# Patient Record
Sex: MALE | Race: WHITE | Employment: OTHER | URBAN - METROPOLITAN AREA
[De-identification: names, ages, dates, MRNs, and addresses within clinical notes are randomized per-mention and may not be internally consistent; named-entity substitution may affect disease eponyms.]

---

## 2017-09-25 ENCOUNTER — LAB ONLY (OUTPATIENT)
Dept: INTERNAL MEDICINE CLINIC | Age: 77
End: 2017-09-25

## 2017-09-25 DIAGNOSIS — E11.9 TYPE 2 DIABETES MELLITUS WITHOUT COMPLICATION, WITHOUT LONG-TERM CURRENT USE OF INSULIN (HCC): Primary | ICD-10-CM

## 2017-09-25 LAB
GLUCOSE POC: 89 MG/DL (ref 75–110)
HBA1C MFR BLD HPLC: 6.1 % (ref 4.5–5.7)

## 2017-10-19 RX ORDER — HYDROCHLOROTHIAZIDE 12.5 MG/1
TABLET ORAL
Qty: 45 TAB | Refills: 2 | Status: SHIPPED | OUTPATIENT
Start: 2017-10-19 | End: 2018-07-19 | Stop reason: SDUPTHER

## 2017-10-19 NOTE — TELEPHONE ENCOUNTER
Requested Prescriptions     Pending Prescriptions Disp Refills    hydroCHLOROthiazide (HYDRODIURIL) 12.5 mg tablet [Pharmacy Med Name: HYDROCHLOROTHIAZIDE TABS 12.5MG] 45 Tab 2     Sig: TAKE 1 TABLET EVERY OTHER DAY       Last Refill: 1-20-17  Last visit:6-22-17

## 2017-10-23 RX ORDER — LISINOPRIL 20 MG/1
TABLET ORAL
Qty: 90 TAB | Refills: 3 | Status: SHIPPED | OUTPATIENT
Start: 2017-10-23 | End: 2018-10-18 | Stop reason: SDUPTHER

## 2017-10-23 NOTE — TELEPHONE ENCOUNTER
Requested Prescriptions     Pending Prescriptions Disp Refills    lisinopril (PRINIVIL, ZESTRIL) 20 mg tablet [Pharmacy Med Name: LISINOPRIL TABS 20MG] 90 Tab 3     Sig: TAKE 1 TABLET DAILY       Last Refill: 10-26-16  Last visit:6-22-17

## 2018-01-10 ENCOUNTER — ANESTHESIA EVENT (OUTPATIENT)
Dept: ENDOSCOPY | Age: 78
End: 2018-01-10
Payer: MEDICARE

## 2018-01-10 NOTE — ANESTHESIA PREPROCEDURE EVALUATION
Anesthetic History   No history of anesthetic complications            Review of Systems / Medical History  Patient summary reviewed, nursing notes reviewed and pertinent labs reviewed    Pulmonary                Comments: Former smoker - Quit 1987 - 50 pack years   Neuro/Psych             Comments: Peripheral Neuropathy Cardiovascular    Hypertension: well controlled              Exercise tolerance: >4 METS     GI/Hepatic/Renal     GERD: well controlled      Liver disease    Comments: Esophageal Dysphagia  H/O Esophageal Cancer  CHAVEZ  H/O Colon Polyps  Diverticulosis Endo/Other    Diabetes: well controlled, type 2    Arthritis    Comments: Diet-controlled DMII Other Findings   Comments: Vitamin D Deficiency         Physical Exam    Airway  Mallampati: I  TM Distance: > 6 cm  Neck ROM: normal range of motion   Mouth opening: Normal     Cardiovascular  Regular rate and rhythm,  S1 and S2 normal,  no murmur, click, rub, or gallop             Dental    Dentition: Edentulous, Full lower dentures and Full upper dentures     Pulmonary  Breath sounds clear to auscultation               Abdominal  GI exam deferred       Other Findings            Anesthetic Plan    ASA: 3  Anesthesia type: general and total IV anesthesia          Induction: Intravenous  Anesthetic plan and risks discussed with: Patient

## 2018-01-11 ENCOUNTER — ANESTHESIA (OUTPATIENT)
Dept: ENDOSCOPY | Age: 78
End: 2018-01-11
Payer: MEDICARE

## 2018-01-11 ENCOUNTER — HOSPITAL ENCOUNTER (OUTPATIENT)
Age: 78
Setting detail: OUTPATIENT SURGERY
Discharge: HOME | End: 2018-01-11
Attending: SPECIALIST | Admitting: SPECIALIST
Payer: MEDICARE

## 2018-01-11 VITALS
SYSTOLIC BLOOD PRESSURE: 110 MMHG | WEIGHT: 166 LBS | DIASTOLIC BLOOD PRESSURE: 69 MMHG | HEIGHT: 70 IN | RESPIRATION RATE: 13 BRPM | HEART RATE: 63 BPM | TEMPERATURE: 98.1 F | OXYGEN SATURATION: 97 % | BODY MASS INDEX: 23.77 KG/M2

## 2018-01-11 PROCEDURE — 74011250636 HC RX REV CODE- 250/636

## 2018-01-11 PROCEDURE — 74011000250 HC RX REV CODE- 250

## 2018-01-11 PROCEDURE — C1726 CATH, BAL DIL, NON-VASCULAR: HCPCS | Performed by: SPECIALIST

## 2018-01-11 PROCEDURE — 74011250636 HC RX REV CODE- 250/636: Performed by: SPECIALIST

## 2018-01-11 PROCEDURE — 77030018712 HC DEV BLLN INFL BSC -B: Performed by: SPECIALIST

## 2018-01-11 PROCEDURE — 76060000031 HC ANESTHESIA FIRST 0.5 HR: Performed by: SPECIALIST

## 2018-01-11 PROCEDURE — 76040000019: Performed by: SPECIALIST

## 2018-01-11 RX ORDER — SODIUM CHLORIDE 9 MG/ML
75 INJECTION, SOLUTION INTRAVENOUS CONTINUOUS
Status: DISCONTINUED | OUTPATIENT
Start: 2018-01-11 | End: 2018-01-11 | Stop reason: HOSPADM

## 2018-01-11 RX ORDER — SODIUM CHLORIDE 0.9 % (FLUSH) 0.9 %
5-10 SYRINGE (ML) INJECTION EVERY 8 HOURS
Status: DISCONTINUED | OUTPATIENT
Start: 2018-01-11 | End: 2018-01-11 | Stop reason: HOSPADM

## 2018-01-11 RX ORDER — EPINEPHRINE 0.1 MG/ML
1 INJECTION INTRACARDIAC; INTRAVENOUS
Status: DISCONTINUED | OUTPATIENT
Start: 2018-01-11 | End: 2018-01-11 | Stop reason: HOSPADM

## 2018-01-11 RX ORDER — PROPOFOL 10 MG/ML
INJECTION, EMULSION INTRAVENOUS AS NEEDED
Status: DISCONTINUED | OUTPATIENT
Start: 2018-01-11 | End: 2018-01-11 | Stop reason: HOSPADM

## 2018-01-11 RX ORDER — ATROPINE SULFATE 0.1 MG/ML
0.5 INJECTION INTRAVENOUS
Status: DISCONTINUED | OUTPATIENT
Start: 2018-01-11 | End: 2018-01-11 | Stop reason: HOSPADM

## 2018-01-11 RX ORDER — LIDOCAINE HYDROCHLORIDE 20 MG/ML
INJECTION, SOLUTION EPIDURAL; INFILTRATION; INTRACAUDAL; PERINEURAL AS NEEDED
Status: DISCONTINUED | OUTPATIENT
Start: 2018-01-11 | End: 2018-01-11 | Stop reason: HOSPADM

## 2018-01-11 RX ORDER — SODIUM CHLORIDE 0.9 % (FLUSH) 0.9 %
5-10 SYRINGE (ML) INJECTION AS NEEDED
Status: DISCONTINUED | OUTPATIENT
Start: 2018-01-11 | End: 2018-01-11 | Stop reason: HOSPADM

## 2018-01-11 RX ORDER — NALOXONE HYDROCHLORIDE 0.4 MG/ML
0.4 INJECTION, SOLUTION INTRAMUSCULAR; INTRAVENOUS; SUBCUTANEOUS
Status: DISCONTINUED | OUTPATIENT
Start: 2018-01-11 | End: 2018-01-11 | Stop reason: HOSPADM

## 2018-01-11 RX ORDER — MIDAZOLAM HYDROCHLORIDE 1 MG/ML
1-3 INJECTION, SOLUTION INTRAMUSCULAR; INTRAVENOUS
Status: DISCONTINUED | OUTPATIENT
Start: 2018-01-11 | End: 2018-01-11 | Stop reason: HOSPADM

## 2018-01-11 RX ORDER — DEXTROMETHORPHAN/PSEUDOEPHED 2.5-7.5/.8
1.2 DROPS ORAL
Status: DISCONTINUED | OUTPATIENT
Start: 2018-01-11 | End: 2018-01-11 | Stop reason: HOSPADM

## 2018-01-11 RX ORDER — MIDAZOLAM HYDROCHLORIDE 1 MG/ML
.25-5 INJECTION, SOLUTION INTRAMUSCULAR; INTRAVENOUS
Status: DISCONTINUED | OUTPATIENT
Start: 2018-01-11 | End: 2018-01-11 | Stop reason: HOSPADM

## 2018-01-11 RX ORDER — FLUMAZENIL 0.1 MG/ML
0.2 INJECTION INTRAVENOUS
Status: DISCONTINUED | OUTPATIENT
Start: 2018-01-11 | End: 2018-01-11 | Stop reason: HOSPADM

## 2018-01-11 RX ADMIN — LIDOCAINE HYDROCHLORIDE 50 MG: 20 INJECTION, SOLUTION EPIDURAL; INFILTRATION; INTRACAUDAL; PERINEURAL at 10:18

## 2018-01-11 RX ADMIN — SODIUM CHLORIDE 75 ML/HR: 900 INJECTION, SOLUTION INTRAVENOUS at 10:07

## 2018-01-11 RX ADMIN — PROPOFOL 70 MG: 10 INJECTION, EMULSION INTRAVENOUS at 10:22

## 2018-01-11 RX ADMIN — PROPOFOL 50 MG: 10 INJECTION, EMULSION INTRAVENOUS at 10:23

## 2018-01-11 RX ADMIN — PROPOFOL 30 MG: 10 INJECTION, EMULSION INTRAVENOUS at 10:28

## 2018-01-11 RX ADMIN — PROPOFOL 20 MG: 10 INJECTION, EMULSION INTRAVENOUS at 10:27

## 2018-01-11 RX ADMIN — PROPOFOL 30 MG: 10 INJECTION, EMULSION INTRAVENOUS at 10:25

## 2018-01-11 NOTE — DISCHARGE INSTRUCTIONS
Daniel Steve  [id number]  1940              Procedure  Discharge Instructions:      Discomfort:  Redness at IV site- apply warm compress to area; if redness or soreness persist- contact your physician  There may be a slight amount of blood passed from the rectum  Gaseous discomfort- walking, belching will help relieve any discomfort  You may not operate a vehicle for 12 hours  You may not engage in an occupation involving machinery or appliances for rest of today  You may not drink alcoholic beverages for at least 12 hours  Avoid making any critical decisions for at least 24 hour  DIET:   You may resume your normal diet today. You should not overeat or \"feast\" today as your abdomen may become distended or uncomfortable. MEDICATIONS:   I reconciled this list from the list you gave us when you came today for the procedure. Please clarify with me, your primary care physician and the nurse who is discharging you if we have any discrepancies. Aspirin and or non-steroidal medication (Ibuprofen, Motrin, naproxen, etc.) is ok in limited quantities. ACTIVITY:  You may resume your normal daily activities it is recommended that you spend the remainder of the day resting -  avoid any strenuous activity. CALL M.D. ANY SIGN OF:  Increasing pain, nausea, vomiting  Abdominal distension (swelling)  New increased bleeding (oral or rectal)  Fever (chills)  Pain in chest area  Bloody discharge from nose or mouth  Shortness of breath          Follow-up Instructions:   No biopsies   Telephone #  [phone]  Follow up visit as needed; call for dilation as needed. Ioana Abernathy MD  10:33 AM  1/11/2018   [employer] Activation    Thank you for requesting access to [employer]. Please follow the instructions below to securely access and download your online medical record.  [employer] allows you to send messages to your doctor, view your test results, renew your prescriptions, schedule appointments, and more.    How Do I Sign Up? 1. In your internet browser, go to [email or web address]  2. Click on the First Time User? Click Here link in the Sign In box. You will be redirect to the New Member Sign Up page. 3. Enter your O[employer] Access Code exactly as it appears below. You will not need to use this code after youve completed the sign-up process. If you do not sign up before the expiration date, you must request a new code. [employer] Access Code: [email or web address]  Expires: 2018 11:01 AM (This is the date your [employer] access code will )    4. Enter the last four digits of your Social Security Number (xxxx) and Date of Birth (mm/dd/yyyy) as indicated and click Submit. You will be taken to the next sign-up page. 5. Create a O[employer]ven ID. This will be your [employer] login ID and cannot be changed, so think of one that is secure and easy to remember. 6. Create a [employer] password. You can change your password at any time. 7. Enter your Password Reset Question and Answer. This can be used at a later time if you forget your password. 8. Enter your e-mail address. You will receive e-mail notification when new information is available in [address] 9. Click Sign Up. You can now view and download portions of your medical record. 10. Click the Download Summary menu link to download a portable copy of your medical information. Additional Information    If you have questions, please visit the Frequently Asked Questions section of the [employer] website at [email or web address] Remember, [employer] is NOT to be used for urgent needs. For medical emergencies, dial 911.

## 2018-01-11 NOTE — ANESTHESIA POSTPROCEDURE EVALUATION
Post-Anesthesia Evaluation and Assessment    Patient: Donny Craven MRN: [id number]  SSN: [id number]    YOB: 1940  Age: 66 y.o. Sex: male       Cardiovascular Function/Vital Signs  Visit Vitals    /75    Pulse (!) 59    Temp 36.6 °C (97.9 °F)    Resp 10    Ht 5' 10\" (1.778 m)    Wt 75.3 kg (166 lb)    SpO2 97%    BMI 23.82 kg/m2       Patient is status post general, total IV anesthesia anesthesia for Procedure(s):  ESOPHAGOGASTRODUODENOSCOPY (EGD)  ESOPHAGEAL DILATION. Nausea/Vomiting: None    Postoperative hydration reviewed and adequate. Pain:  Pain Scale 1: Visual (01/11/18 1035)  Pain Intensity 1: 0 (01/11/18 1035)   Managed    Neurological Status: At baseline    Mental Status and Level of Consciousness: Arousable    Pulmonary Status:   O2 Device: CO2 nasal cannula (01/11/18 1031)   Adequate oxygenation and airway patent    Complications related to anesthesia: None    Post-anesthesia assessment completed.  No concerns    Signed By: Eve Greenberg MD     January 11, 2018

## 2018-01-11 NOTE — IP AVS SNAPSHOT
[address]. 
[phone] Patient: Donny Craven MRN: [id number] SZA:[id number] About your hospitalization You were admitted on:  January 11, 2018 You last received care in the:  [facility] ENDOSCOPY You were discharged on:  January 11, 2018 Why you were hospitalized Your primary diagnosis was:  Not on File Your diagnoses also included:  Dysphagia, History Of Esophageal Cancer Follow-up Information Follow up With Details Comments Contact Info MD Ragini [address] [facility]. 
[phone] Your Scheduled Appointments Thursday January 25, 2018  9:30 AM EST COMPLETE PHYSICAL with Rachel Morel MD  
[place] [address] ([facility]) [address] [phone] Discharge Orders None A check jacey indicates which time of day the medication should be taken. My Medications CONTINUE taking these medications Instructions Each Dose to Equal  
 Morning Noon Evening Bedtime  
 aspirin 81 mg tablet Your last dose was: Your next dose is: Take 81 mg by mouth daily. 81 mg  
    
   
   
   
  
 cholecalciferol (VITAMIN D3) 5,000 unit Tab tablet Commonly known as:  VITAMIN D3 Your last dose was: Your next dose is: Take 5,000 Units by mouth daily. 5000 Units * hydroCHLOROthiazide 12.5 mg capsule Commonly known as:  Ivy Hamburger Your last dose was: Your next dose is: Take 12.5 mg by mouth every other day. 12.5 mg  
    
   
   
   
  
 * hydroCHLOROthiazide 12.5 mg tablet Commonly known as:  HYDRODIURIL Your last dose was: Your next dose is:  TAKE 1 TABLET EVERY OTHER DAY  
     
   
   
   
  
 lisinopril 20 mg tablet Commonly known as:  Shanae Mesa Your last dose was: Your next dose is: TAKE 1 TABLET DAILY MOEXIPRIL PO Your last dose was: Your next dose is: Take 15 mg by mouth daily. 15 mg MULTIVITAMIN PO Your last dose was: Your next dose is: Take  by mouth daily. NexIUM 40 mg capsule Generic drug:  esomeprazole Your last dose was: Your next dose is: Take 40 mg by mouth every evening. 40 mg  
    
   
   
   
  
 PREVALITE PO Your last dose was: Your next dose is: Take 5.5 g by mouth every evening. 5.5 g  
    
   
   
   
  
 sildenafil citrate 50 mg tablet Commonly known as:  VIAGRA Your last dose was: Your next dose is: Take 50 mg by mouth as needed. 50 mg  
    
   
   
   
  
 VITAMIN B-12 PO Your last dose was: Your next dose is: Take 1,000 mg by mouth daily. 2 tablets 1000 mg  
    
   
   
   
  
 VITAMIN B-6 100 mg tablet Generic drug:  pyridoxine (vitamin B6) Your last dose was: Your next dose is: Take 100 mg by mouth daily. 100 mg  
    
   
   
   
  
 VITAMINS A AND D PO Your last dose was: Your next dose is: Take 1 Cap by mouth daily. 1 Cap ZANTAC 150 mg tablet Generic drug:  raNITIdine Your last dose was: Your next dose is: Take 150 mg by mouth every morning. 150 mg  
    
   
   
   
  
 * Notice: This list has 2 medication(s) that are the same as other medications prescribed for you. Read the directions carefully, and ask your doctor or other care provider to review them with you. Discharge Instructions Ohjailene Danny [id number] 
1940 Procedure  Discharge Instructions:   
 
Discomfort: 
Redness at IV site- apply warm compress to area; if redness or soreness persist- contact your physician There may be a slight amount of blood passed from the rectum Gaseous discomfort- walking, belching will help relieve any discomfort You may not operate a vehicle for 12 hours You may not engage in an occupation involving machinery or appliances for rest of today You may not drink alcoholic beverages for at least 12 hours Avoid making any critical decisions for at least 24 hour DIET: 
 You may resume your normal diet today. You should not overeat or \"feast\" today as your abdomen may become distended or uncomfortable. MEDICATIONS: 
 I reconciled this list from the list you gave us when you came today for the procedure. Please clarify with me, your primary care physician and the nurse who is discharging you if we have any discrepancies. Aspirin and or non-steroidal medication (Ibuprofen, Motrin, naproxen, etc.) is ok in limited quantities. ACTIVITY: 
You may resume your normal daily activities it is recommended that you spend the remainder of the day resting -  avoid any strenuous activity. CALL M.D. ANY SIGN OF: Increasing pain, nausea, vomiting Abdominal distension (swelling) New increased bleeding (oral or rectal) Fever (chills) Pain in chest area Bloody discharge from nose or mouth Shortness of breath Follow-up Instructions: No biopsies Telephone #  [phone] Follow up visit as needed; call for dilation as needed. Ioana Abernathy MD 
10:33 AM 
1/11/2018 [employer] Activation Thank you for requesting access to [employer]. Please follow the instructions below to securely access and download your online medical record. [employer] allows you to send messages to your doctor, view your test results, renew your prescriptions, schedule appointments, and more. How Do I Sign Up? 1. In your internet browser, go to [email or web address] 
2. Click on the First Time User? Click Here link in the Sign In box. You will be redirect to the New Member Sign Up page. 3. Enter your [employer] Access Code exactly as it appears below. You will not need to use this code after youve completed the sign-up process. If you do not sign up before the expiration date, you must request a new code. [employer] Access Code: [email or web address] Expires: 2018 11:01 AM (This is the date your [employer] access code will ) 4. Enter the last four digits of your Social Security Number (xxxx) and Date of Birth (mm/dd/yyyy) as indicated and click Submit. You will be taken to the next sign-up page. 5. Create a [employer]Master ID. This will be your [employer] login ID and cannot be changed, so think of one that is secure and easy to remember. 6. Create a [employer] password. You can change your password at any time. 7. Enter your Password Reset Question and Answer. This can be used at a later time if you forget your password. 8. Enter your e-mail address. You will receive e-mail notification when new information is available in [address] 9. Click Sign Up. You can now view and download portions of your medical record. 10. Click the Download Summary menu link to download a portable copy of your medical information. Additional Information If you have questions, please visit the Frequently Asked Questions section of the [employer] website at [email or web address] Remember, [employer] is NOT to be used for urgent needs. For medical emergencies, dial 911. Introducing [facility]! Jamin Goncalves introduces [employer] patient portal. Now you can access parts of your medical record, email your doctor's office, and request medication refills online. 1. In your internet browser, go to [email or web address] 2. Click on the First Time User? Click Here link in the Sign In box.  You will see the New Member Sign Up page. 3. Enter your CinemaNow Access Code exactly as it appears below. You will not need to use this code after youve completed the sign-up process. If you do not sign up before the expiration date, you must request a new code. · [employer] Access Code: [email or web address] Expires: 4/11/2018 11:01 AM 
 
4. Enter the last four digits of your Social Security Number (xxxx) and Date of Birth (mm/dd/yyyy) as indicated and click Submit. You will be taken to the next sign-up page. 5. Create a Producteevt ID. This will be your [employer] login ID and cannot be changed, so think of one that is secure and easy to remember. 6. Create a [employer]Now password. You can change your password at any time. 7. Enter your Password Reset Question and Answer. This can be used at a later time if you forget your password. 8. Enter your e-mail address. You will receive e-mail notification when new information is available in [facility] 9. Click Sign Up. You can now view and download portions of your medical record. 10. Click the Download Summary menu link to download a portable copy of your medical information. If you have questions, please visit the Frequently Asked Questions section of the [employer] website. Remember, [employer] is NOT to be used for urgent needs. For medical emergencies, dial 911. Now available from your iPhone and Android! Providers Seen During Your Hospitalization Provider Specialty Primary office phone Griselda Easter, MD Gastroenterology [phone] Your Primary Care Physician (PCP) Primary Care Physician Office Phone Office Fax Yesica Winchester [phone] You are allergic to the following No active allergies Recent Documentation Height Weight BMI Smoking Status 1.778 m 75.3 kg 23.82 kg/m2 Former Smoker Emergency Contacts Name Discharge Info Relation Home Work Mobile Lara CAREGIVER [3] Spouse [3] [phone] Sherry Briceno  Child [2]   [phone] Patient Belongings The following personal items are in your possession at time of discharge: 
  Dental Appliances: None  Visual Aid: None Please provide this summary of care documentation to your next provider. Signatures-by signing, you are acknowledging that this After Visit Summary has been reviewed with you and you have received a copy. Patient Signature:  ____________________________________________________________ Date:  ____________________________________________________________  
  
[facility] Provider Signature:  ____________________________________________________________ Date:  ____________________________________________________________

## 2018-01-11 NOTE — PROGRESS NOTES
Anesthesia reports 200mg Propofol, 50mg Lidocaine and 500mL NS given during procedure. Received report from anesthesia staff on vital signs and status of patient.

## 2018-01-11 NOTE — PROGRESS NOTES
CRE balloon dilatation of the esophagus   12-15 mm Balloon inflated to 12 ATMs and held for 60 seconds. 12-15 mm Balloon inflated to 13.5 ATMs and held for 60 seconds. 12-15 mm Balloon inflated to 15 ATMs and held for 60 seconds. No subcutaneous crepitus of the chest or cervical region was noted post dilatation.

## 2018-01-11 NOTE — ROUTINE PROCESS
Sherly Dies  1940  [id number]    Situation:  Verbal report received from:  CHEYENNE Valdes RN  Procedure: Procedure(s):  ESOPHAGOGASTRODUODENOSCOPY (EGD)  ESOPHAGEAL DILATION    Background:    Preoperative diagnosis: ESOPHAGEAL DYSPHAGIA   Postoperative diagnosis: Post op exam, proximal esophageal stenosis    :  Dr. Tracey Orlando  Assistant(s): Endoscopy Technician-1: Gladys Victoria  Endoscopy RN-1: Shahnaz Zamora RN    Specimens: * No specimens in log *  H. Pylori  no    Assessment:  Intra-procedure medications     Anesthesia gave intra-procedure sedation and medications, see anesthesia flow sheet     Intravenous fluids: NS@ KVO     Vital signs stable     Abdominal assessment: round and soft     Recommendation:  Discharge patient per MD order  Family or Friend   Permission to share finding with family or friend yes

## 2018-01-11 NOTE — PROCEDURES
Esophagogastroduodenoscopy    Indications:  Dysphagia  Known esophageal stricture    Medications:  See anesthesia form    Post procedure diagnosis:  Post op exam, proximal esophageal stenosis    Description of Procedure:    Prior to the procedure its objectives, risks, consequences and alternatives were discussed with the patient who then elected to proceed. The Olympus video endoscope was inserted under direct vision into the mouth and then into the esophagus. The esophagus looked normal to 20 cm. There was a bland anastomotic stenosis. The z-line was located at 22cm. There was bile in the stomach. There were no diagnostic abnormalities of the body, fundus, antrum, cardia and incisura of the stomach. This included direct and retroflexion examination. The pylorus was widely patent. The first and second portion of the duodenum appeared normal.  I then dilated the distal esophagus with a through the scope balloon. I dilated from 12mm to 13.5mm to 15 mm. Each time the balloon was inflated for sixty seconds. There were no apparent complications. There was a small non transmural tear after the third dilation      Complications: There were no apparent complications and the patient tolerated the procedure well.         Estimated Blood Loss:  none  Specimens Removed:  none  Impressions:  Successful dilation of anastomotic stricture to 15mm  Post op exam      Signed By: Ponce Del Toro MD                        January 11, 2018     10:30 AM

## 2018-01-11 NOTE — H&P
Pre-endoscopy H and P    The patient was seen and examined in the endoscopy suite. The airway was assessed and docuemented. The problem list, past medical history, and medications were reviewed. Patient Active Problem List   Diagnosis Code    History of esophageal cancer Z85.01    Common bile duct stone K80.50    Choledocholithiasis K80.50    Diabetes mellitus ([facility] [address] E11.9    Essential hypertension, benign I10    Esophageal reflux K21.9    Personal history of colonic polyps Z86.010    Dysphagia R13.10     Social History     Social History    Marital status:      Spouse name: N/A    Number of children: N/A    Years of education: N/A     Occupational History    Not on file. Social History Main Topics    Smoking status: Former Smoker     Packs/day: 2.00     Years: 25.00     Quit date: 1/1/1987    Smokeless tobacco: Never Used    Alcohol use No    Drug use: No    Sexual activity: Not on file     Other Topics Concern    Not on file     Social History Narrative     Past Medical History:   Diagnosis Date    Anemia     r/t chemo    Arthritis     BPH (benign prostatic hyperplasia)     Cancer ([facility] [address] 2008    esophageal (chemo and radiation completed 2011)    Diabetes ([facility] [address]     type 2 - diet controlled    Diverticulosis     GERD (gastroesophageal reflux disease)     with history of esophageal stricture    Hx of colonic polyps     Hypertension     Kidney cyst     CHAVEZ (nonalcoholic steatohepatitis) 2000    \"fatty liver\" diet related    Peripheral neuropathy     secondary to chemotherapy    Personal history of colonic polyps 12/6/2012    Thrombocytopenia ([facility] [address]     r/t chemo    Vitamin D deficiency      The patient has a family history of na    Prior to Admission Medications   Prescriptions Last Dose Informant Patient Reported? Taking? CHOLESTYRAMINE/ASPARTAME (PREVALITE PO) 1/10/2018 at Unknown time  Yes Yes   Sig: Take 5.5 g by mouth every evening.    CYANOCOBALAMIN (VITAMIN B-12 PO) 1/10/2018 at Unknown time  Yes Yes   Sig: Take 1,000 mg by mouth daily. 2 tablets   MOEXIPRIL HCL (MOEXIPRIL PO) 1/10/2018 at Unknown time  Yes Yes   Sig: Take 15 mg by mouth daily. MULTIVITAMINS (MULTIVITAMIN PO) 1/10/2018 at Unknown time  Yes Yes   Sig: Take  by mouth daily. VITAMINS A AND D PO 1/10/2018 at Unknown time  Yes Yes   Sig: Take 1 Cap by mouth daily. aspirin 81 mg tablet 1/10/2018 at Unknown time  Yes Yes   Sig: Take 81 mg by mouth daily. cholecalciferol, VITAMIN D3, (VITAMIN D3) 5,000 unit tab tablet 1/10/2018 at Unknown time  Yes Yes   Sig: Take 5,000 Units by mouth daily. esomeprazole (NEXIUM) 40 mg capsule 1/10/2018 at Unknown time  Yes Yes   Sig: Take 40 mg by mouth every evening. hydroCHLOROthiazide (HYDRODIURIL) 12.5 mg tablet 1/10/2018 at Unknown time  No Yes   Sig: TAKE 1 TABLET EVERY OTHER DAY   hydrochlorothiazide (MICROZIDE) 12.5 mg capsule 1/10/2018 at Unknown time  Yes Yes   Sig: Take 12.5 mg by mouth every other day. lisinopril (PRINIVIL, ZESTRIL) 20 mg tablet 1/10/2018 at Unknown time  No Yes   Sig: TAKE 1 TABLET DAILY   pyridoxine (VITAMIN B-6) 100 mg tablet 1/10/2018 at Unknown time  Yes Yes   Sig: Take 100 mg by mouth daily. ranitidine (ZANTAC) 150 mg tablet 1/10/2018 at Unknown time  Yes Yes   Sig: Take 150 mg by mouth every morning. sildenafil citrate (VIAGRA) 50 mg tablet 1/10/2018 at Unknown time  Yes Yes   Sig: Take 50 mg by mouth as needed. Facility-Administered Medications: None           The review of systems is:  negative for shortness of breath or chest pain      The heart, lungs, and mental status were satisfactory for the administration of anesthesia sedation and for the procedure. I discussed with the patient the objectives, risks, consequences and alternatives to the procedure.       Adela Harris MD  1/11/2018  10:07 AM

## 2018-01-16 PROBLEM — K57.90 DIVERTICULOSIS: Status: ACTIVE | Noted: 2018-01-16

## 2018-01-16 PROBLEM — G62.0 DRUG-INDUCED POLYNEUROPATHY (HCC): Status: ACTIVE | Noted: 2018-01-16

## 2018-01-16 PROBLEM — K22.2 GERD WITH STRICTURE: Status: ACTIVE | Noted: 2018-01-16

## 2018-01-16 PROBLEM — J38.01 UNILATERAL PARTIAL PARALYSIS OF VOCAL CORDS OR LARYNX: Status: ACTIVE | Noted: 2018-01-16

## 2018-01-16 PROBLEM — E55.9 VITAMIN D DEFICIENCY: Status: ACTIVE | Noted: 2018-01-16

## 2018-01-16 PROBLEM — I10 HYPERTENSION: Status: ACTIVE | Noted: 2018-01-16

## 2018-01-16 PROBLEM — K21.9 GERD WITH STRICTURE: Status: ACTIVE | Noted: 2018-01-16

## 2018-01-16 PROBLEM — N40.0 BPH WITHOUT OBSTRUCTION/LOWER URINARY TRACT SYMPTOMS: Status: ACTIVE | Noted: 2018-01-16

## 2018-01-19 ENCOUNTER — HOSPITAL ENCOUNTER (OUTPATIENT)
Dept: GENERAL RADIOLOGY | Age: 78
Discharge: HOME | End: 2018-01-19
Attending: SPECIALIST
Payer: MEDICARE

## 2018-01-19 DIAGNOSIS — K21.9 GERD (GASTROESOPHAGEAL REFLUX DISEASE): ICD-10-CM

## 2018-01-19 DIAGNOSIS — R13.19 CONSTANT LOW-GRADE DYSPHAGIA: ICD-10-CM

## 2018-01-19 PROCEDURE — 74241 XR UPPER GI W KUB/ BA SWALLOW: CPT

## 2018-01-22 RX ORDER — ESOMEPRAZOLE MAGNESIUM 40 MG/1
CAPSULE, DELAYED RELEASE ORAL
Qty: 90 CAP | Refills: 1 | Status: SHIPPED | OUTPATIENT
Start: 2018-01-22 | End: 2018-07-19 | Stop reason: SDUPTHER

## 2018-01-25 ENCOUNTER — OFFICE VISIT (OUTPATIENT)
Dept: INTERNAL MEDICINE CLINIC | Age: 78
End: 2018-01-25

## 2018-01-25 VITALS
BODY MASS INDEX: 24.34 KG/M2 | WEIGHT: 170 LBS | OXYGEN SATURATION: 99 % | HEART RATE: 65 BPM | DIASTOLIC BLOOD PRESSURE: 64 MMHG | SYSTOLIC BLOOD PRESSURE: 110 MMHG | HEIGHT: 70 IN

## 2018-01-25 DIAGNOSIS — I10 ESSENTIAL HYPERTENSION: Primary | Chronic | ICD-10-CM

## 2018-01-25 DIAGNOSIS — E11.9 TYPE 2 DIABETES MELLITUS WITHOUT COMPLICATION, WITHOUT LONG-TERM CURRENT USE OF INSULIN (HCC): Chronic | ICD-10-CM

## 2018-01-25 DIAGNOSIS — Z86.010 HISTORY OF COLONIC POLYPS: ICD-10-CM

## 2018-01-25 DIAGNOSIS — G62.0 DRUG-INDUCED POLYNEUROPATHY (HCC): ICD-10-CM

## 2018-01-25 DIAGNOSIS — K21.9 GERD WITH STRICTURE: ICD-10-CM

## 2018-01-25 DIAGNOSIS — N40.0 BPH WITHOUT OBSTRUCTION/LOWER URINARY TRACT SYMPTOMS: ICD-10-CM

## 2018-01-25 DIAGNOSIS — E55.9 VITAMIN D DEFICIENCY: ICD-10-CM

## 2018-01-25 DIAGNOSIS — Z13.39 SCREENING FOR ALCOHOLISM: ICD-10-CM

## 2018-01-25 DIAGNOSIS — Z85.01 HISTORY OF ESOPHAGEAL CANCER: ICD-10-CM

## 2018-01-25 DIAGNOSIS — Z00.00 INITIAL MEDICARE ANNUAL WELLNESS VISIT: ICD-10-CM

## 2018-01-25 DIAGNOSIS — K22.2 GERD WITH STRICTURE: ICD-10-CM

## 2018-01-25 LAB
ALBUMIN SERPL-MCNC: 4.4 G/DL (ref 3.9–5.4)
ALKALINE PHOS POC: 80 U/L (ref 38–126)
ALT SERPL-CCNC: 20 U/L (ref 9–52)
AST SERPL-CCNC: 27 U/L (ref 14–36)
BACTERIA UA POCT, BACTPOCT: NORMAL
BILIRUB UR QL STRIP: NEGATIVE
BUN BLD-MCNC: 18 MG/DL (ref 9–20)
CALCIUM BLD-MCNC: 10.1 MG/DL (ref 8.4–10.2)
CASTS UA POCT: 0
CHLORIDE BLD-SCNC: 102 MMOL/L (ref 98–107)
CHOLEST SERPL-MCNC: 154 MG/DL (ref 0–200)
CLUE CELLS, CLUEPOCT: NEGATIVE
CO2 POC: 27 MMOL/L (ref 22–32)
CREAT BLD-MCNC: 1.2 MG/DL (ref 0.8–1.5)
CRYSTALS UA POCT, CRYSPOCT: NEGATIVE
EGFR (POC): 57.6
EPITHELIAL CELLS POCT: NEGATIVE
GLUCOSE POC: 94 MG/DL (ref 75–110)
GLUCOSE UR-MCNC: NEGATIVE MG/DL
GRAN# POC: 4.2 K/UL (ref 2–7.8)
GRAN% POC: 60.5 % (ref 37–92)
HBA1C MFR BLD HPLC: 6 % (ref 4.5–5.7)
HCT VFR BLD CALC: 42.6 % (ref 37–51)
HDLC SERPL-MCNC: 53 MG/DL (ref 35–130)
HGB BLD-MCNC: 14.1 G/DL (ref 12–18)
KETONES P FAST UR STRIP-MCNC: NEGATIVE MG/DL
LDL CHOLESTEROL POC: 80.2 MG/DL (ref 0–130)
LY# POC: 2.2 K/UL (ref 0.6–4.1)
LY% POC: 34.4 % (ref 10–58.5)
MCH RBC QN: 29.2 PG (ref 26–32)
MCHC RBC-ENTMCNC: 33.1 G/DL (ref 30–36)
MCV RBC: 88 FL (ref 80–97)
MICROALBUMIN UR TEST STR-MCNC: NEGATIVE MG/L (ref 0–20)
MID #, POC: 0.3 K/UL (ref 0–1.8)
MID% POC: 5.1 % (ref 0.1–24)
MUCUS UA POCT, MUCPOCT: NORMAL
PH UR STRIP: 6 [PH] (ref 5–7)
PLATELET # BLD: 179 K/UL (ref 140–440)
POTASSIUM SERPL-SCNC: 4.9 MMOL/L (ref 3.6–5)
PROT SERPL-MCNC: 7.3 G/DL (ref 6.3–8.2)
PROT UR QL STRIP: NEGATIVE
PSA SERPL-MCNC: 1.3 NG/ML (ref 0–4)
RBC # BLD: 4.83 M/UL (ref 4.2–6.3)
RBC UA POCT, RBCPOCT: 0
SODIUM SERPL-SCNC: 142 MMOL/L (ref 137–145)
SP GR UR STRIP: 1.01 (ref 1.01–1.02)
TCHOL/HDL RATIO (POC): 2.9 (ref 0–4)
TOTAL BILIRUBIN POC: 0.7 MG/DL (ref 0.2–1.3)
TRICH UA POCT, TRICHPOC: NEGATIVE
TRIGL SERPL-MCNC: 104 MG/DL (ref 0–200)
TSH BLD-ACNC: 1.17 UIU/ML (ref 0.4–4.2)
UA UROBILINOGEN AMB POC: NORMAL (ref 0.2–1)
URINALYSIS CLARITY POC: CLEAR
URINALYSIS COLOR POC: NORMAL
URINE BLOOD POC: NEGATIVE
URINE CULT COMMENT, POCT: NORMAL
URINE LEUKOCYTES POC: NEGATIVE
URINE NITRITES POC: NEGATIVE
VITAMIN D POC: 53 NG/ML (ref 30–96)
VLDLC SERPL CALC-MCNC: 20.8 MG/DL
WBC # BLD: 6.7 K/UL (ref 4.1–10.9)
WBC UA POCT, WBCPOCT: 0
YEAST UA POCT, YEASTPOC: NEGATIVE

## 2018-01-25 NOTE — MR AVS SNAPSHOT
[address] [id number] [phone] Patient: Violeta Silverman MRN: [id number] EOO:[birth year] Visit Information Date & Time Provider Department Dept. Phone Encounter #  
 1/25/2018  9:30 AM Pedro Velasco [phone] [id number] Follow-up Instructions Return in about 6 months (around 7/25/2018). Your Appointments 1/25/2018  9:30 AM  
COMPLETE PHYSICAL with MD Pedro Velasco 26 ([facility]) Appt Note: review; review [address] [id number] [facility]. [facility] [address] Upcoming Health Maintenance Date Due  
 LIPID PANEL Q1 1940 FOOT EXAM Q1 1/2/1950 MICROALBUMIN Q1 1/2/1950 EYE EXAM RETINAL OR DILATED Q1 1/2/1950 DTaP/Tdap/Td series (1 - Tdap) 1/2/1961 ZOSTER VACCINE AGE 60> 11/2/1999 GLAUCOMA SCREENING Q2Y 1/2/2005 MEDICARE YEARLY EXAM 1/2/2005 Influenza Age 5 to Adult 8/1/2017 HEMOGLOBIN A1C Q6M 3/25/2018 Allergies as of 1/25/2018  Review Complete On: 1/25/2018 By: Rosamaria Fermin MD  
 No Known Allergies Current Immunizations  Reviewed on 9/2/2011 Name Date Influenza High Dose Vaccine PF 9/1/2017 Influenza Vaccine 9/30/2016, 10/13/2015 Influenza Vaccine Split 9/26/2011 Pneumococcal Conjugate (PCV-13) 11/30/2015 Pneumococcal Polysaccharide (PPSV-23) 9/2/2009, 10/23/2008 Not reviewed this visit You Were Diagnosed With   
  
 Codes Comments Essential hypertension    -  Primary ICD-10-CM: I10 
ICD-9-CM: 401.9 Type 2 diabetes mellitus without complication, without long-term current use of insulin (HCC)     ICD-10-CM: E11.9 ICD-9-CM: 250.00 GERD with stricture     ICD-10-CM: K21.9, K22.2 ICD-9-CM: 530.81, 530.3 History of esophageal cancer     ICD-10-CM: Z85.01 
ICD-9-CM: V10.03 History of colonic polyps     ICD-10-CM: Z86.010 
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  
 110/64 (BP 1 Location: Left arm, BP Patient Position: Sitting) 65 5' 10\" (1.778 m) 170 lb (77.1 kg) 99% 24.39 kg/m2 Smoking Status Former Smoker BMI and BSA Data Body Mass Index Body Surface Area  
 24.39 kg/m 2 1.95 m 2 Preferred Pharmacy Pharmacy Name [address] [phone] Your Updated Medication List  
  
   
This list is accurate as of: 1/25/18  9:28 AM.  Always use your most recent med list.  
  
  
  
  
 aspirin 81 mg tablet Take 81 mg by mouth daily. cholecalciferol (VITAMIN D3) 5,000 unit Tab tablet Commonly known as:  VITAMIN D3 Take 5,000 Units by mouth daily. esomeprazole 40 mg capsule Commonly known as:  NEXIUM  
TAKE 1 CAPSULE DAILY FOLGARD RX 2.2-25-1 mg Tab Generic drug:  folic acid-vit X9-XHY Q25 Take  by mouth. hydroCHLOROthiazide 12.5 mg tablet Commonly known as:  HYDRODIURIL  
TAKE 1 TABLET EVERY OTHER DAY  
  
 lisinopril 20 mg tablet Commonly known as:  PRINIVIL, ZESTRIL  
TAKE 1 TABLET DAILY MOEXIPRIL PO Take 15 mg by mouth daily. MULTIVITAMIN PO Take  by mouth daily. PREVALITE PO Take 5.5 g by mouth every evening. sildenafil citrate 50 mg tablet Commonly known as:  VIAGRA Take 50 mg by mouth as needed. VITAMIN B-12 PO Take 1,000 mg by mouth daily. 2 tablets VITAMIN B-6 100 mg tablet Generic drug:  pyridoxine (vitamin B6) Take 100 mg by mouth daily. VITAMINS A AND D PO Take 1 Cap by mouth daily. ZANTAC 150 mg tablet Generic drug:  raNITIdine Take 150 mg by mouth every morning. We Performed the Following AMB POC COMPLETE CBC,AUTOMATED ENTER [id number] CPT(R)] AMB POC COMPREHENSIVE METABOLIC PANEL [35956 CPT(R)] AMB POC HEMOGLOBIN A1C [20701 CPT(R)] AMB POC LIPID PROFILE [33351 CPT(R)] AMB POC PSA [98891 CPT(R)] AMB POC TSH [44926 CPT(R)] AMB POC URINALYSIS DIP STICK AUTO W/ MICRO  [94529 CPT(R)] AMB POC URINE, MICROALBUMIN, SEMIQUANT (1 RESULT) [24277 CPT(R)] AMB POC VITAMIN D [54793 CPT(R)] COLLECTION VENOUS BLOOD,VENIPUNCTURE [id number] CPT(R)] Follow-up Instructions Return in about 6 months (around 7/25/2018). Patient Instructions Diabetes Foot Health: Care Instructions Your Care Instructions When you have diabetes, your feet need extra care and attention. Diabetes can damage the nerve endings and blood vessels in your feet, making you less likely to notice when your feet are injured. Diabetes also limits your body's ability to fight infection and get blood to areas that need it. If you get a minor foot injury, it could become an ulcer or a serious infection. With good foot care, you can prevent most of these problems. Caring for your feet can be quick and easy. Most of the care can be done when you are bathing or getting ready for bed. Follow-up care is a key part of your treatment and safety. Be sure to make and go to all appointments, and call your doctor if you are having problems. It's also a good idea to know your test results and keep a list of the medicines you take. How can you care for yourself at home? · Keep your blood sugar close to normal by watching what and how much you eat, monitoring blood sugar, taking medicines if prescribed, and getting regular exercise. · Do not smoke. Smoking affects blood flow and can make foot problems worse. If you need help quitting, talk to your doctor about stop-smoking programs and medicines. These can increase your chances of quitting for good. · Eat a diet that is low in fats. High fat intake can cause fat to build up in your blood vessels and decrease blood flow. · Inspect your feet daily for blisters, cuts, cracks, or sores. If you cannot see well, use a mirror or have someone help you. · Take care of your feet: 
[facility] AUTHOR[employer] your feet every day. Use warm (not hot) water. Check the water temperature with your wrists or other part of your body, not your feet. ¨ Dry your feet well. Pat them dry. Do not rub the skin on your feet too hard. Dry well between your toes. If the skin on your feet stays moist, bacteria or a fungus can grow, which can lead to infection. ¨ Keep your skin soft. Use moisturizing skin cream to keep the skin on your feet soft and prevent calluses and cracks. But do not put the cream between your toes, and stop using any cream that causes a rash. ¨ Clean underneath your toenails carefully. Do not use a sharp object to clean underneath your toenails. Use the blunt end of a nail file or other rounded tool. ¨ Trim and file your toenails straight across to prevent ingrown toenails. Use a nail clipper, not scissors. Use an emery board to smooth the edges. · Change socks daily. Socks without seams are best, because seams often rub the feet. You can find socks for people with diabetes from specialty catalogs. · Look inside your shoes every day for things like gravel or torn linings, which could cause blisters or sores. · Buy shoes that fit well: 
¨ Look for shoes that have plenty of space around the toes. This helps prevent bunions and blisters. ¨ Try on shoes while wearing the kind of socks you will usually wear with the shoes. ¨ Avoid plastic shoes. They may rub your feet and cause blisters.  Good shoes should be made of materials that are flexible and breathable, such as leather or cloth. ¨ Break in new shoes slowly by wearing them for no more than an hour a day for several days. Take extra time to check your feet for red areas, blisters, or other problems after you wear new shoes. · Do not go barefoot. Do not wear sandals, and do not wear shoes with very thin soles. Thin soles are easy to puncture. They also do not protect your feet from hot pavement or cold weather. · Have your doctor check your feet during each visit. If you have a foot problem, see your doctor. Do not try to treat an early foot problem at home. Home remedies or treatments that you can buy without a prescription (such as corn removers) can be harmful. · Always get early treatment for foot problems. A minor irritation can lead to a major problem if not properly cared for early. When should you call for help? Call your doctor now or seek immediate medical care if: 
? · You have a foot sore, an ulcer or break in the skin that is not healing after 4 days, bleeding corns or calluses, or an ingrown toenail. ? · You have blue or black areas, which can mean bruising or blood flow problems. ? · You have peeling skin or tiny blisters between your toes or cracking or oozing of the skin. ? · You have a fever for more than 24 hours and a foot sore. ? · You have new numbness or tingling in your feet that does not go away after you move your feet or change positions. ? · You have unexplained or unusual swelling of the foot or ankle. ? Watch closely for changes in your health, and be sure to contact your doctor if: 
? · You cannot do proper foot care. Where can you learn more? Go to http://pavan-erna.info/. Enter A739 in the search box to learn more about \"Diabetes Foot Health: Care Instructions. \" Current as of: March 13, 2017 Content Version: 11.4 © 0301-9068 [employer]. Care instructions adapted under license by [employer] (which disclaims liability or warranty for this information). If you have questions about a medical condition or this instruction, always ask your healthcare professional. [address] any warranty or liability for your use of this information. Introducing [address]! Dear Bre Noguera: Thank you for requesting a [employer] account. Our records indicate that you already have an active [employer] account. You can access your account anytime at [email or web address] Did you know that you can access your hospital and ER discharge instructions at any time in [employer]ation? You can also review all of your test results from your hospital stay or ER visit. Additional Information If you have questions, please visit the Frequently Asked Questions section of the [employer] website at [email or web address] Remember, [employer] is NOT to be used for urgent needs. For medical emergencies, dial 911. Now available from your iPhone and Android! Please provide this summary of care documentation to your next provider. Your primary care clinician is listed as THERON Stewart. If you have any questions after today's visit, please call [phone].

## 2018-01-25 NOTE — PROGRESS NOTES
This note will not be viewable in [address] Roselia Mccartney is a 66 y.o. male and presents with Complete Physical  .    Subjective:  Mr. Mary Caldwell presents to the office today for complete checkup. The patient has hypertension. He currently remains on lisinopril, hydrochlorothiazide . His blood pressures have been well controlled. He denies any dizziness, cough or lower extremity edema. There is been no muscle cramping, headaches, numbness, tingling or focal neurological problems. He has type 2 diabetes mellitus which is now managed with diet. He lost a fair amount of weight as he did suffer with esophageal cancer and is not currently on any medication. He checks his blood sugars once daily and notes that his fasting blood sugars are generally in the 90 range. He has had a diabetic retinal eye exam done within the last year. 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 It was due to chemotherapy for treatment of his esophageal cancer. The patient's esophageal cancer was treated with chemotherapy and radiation therapy. Patient does have continued problems with esophageal strictures for which he is followed by Dr. Shaila Daley and last saw him 3 weeks ago at which time he was dilated again. He currently denies any dysphasia. He does remain on PPI therapy and Zantac for this. Patient has vitamin D deficiency and remains on supplementation for this. The patient has BPH without L UTS. He currently has no flow problems. He is due for PSA testing. He does have colon polyps and did have a colonoscopy within the last several years and will not be due for Dr. Shaila Daley to do this in another year. There is been no change in his bowel movements. The patient is up-to-date on his influenza and pneumococcal vaccinations. His review of systems is otherwise negative is noted.     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 1/16/2018    Cancer ([facility] [address] 2008    esophageal (chemo and radiation completed 2011)    Diverticulosis     Drug-induced polyneuropathy ([facility] [address] 1/16/2018    Due to chemotherapy    GERD with stricture 1/16/2018    Status post dilatation    Hx of colonic polyps     Hypertension     Kidney cyst     CHAVEZ (nonalcoholic steatohepatitis) 2000    \"fatty liver\" diet related    Personal history of colonic polyps 12/6/2012    Thrombocytopenia ([facility] [address]     r/t chemo    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    Vitamin D deficiency 1/16/2018     Past Surgical History:   Procedure Laterality Date    CHEST SURGERY PROCEDURE UNLISTED      esphogus removed; stomach in chest cavity    CHEST SURGERY PROCEDURE UNLISTED      left chcest prtacath    COLONOSCOPY N/A 6/23/2016    COLONOSCOPY performed by Gabriela Garcia MD at [facility] ENDOSCOPY    HX CHOLECYSTECTOMY  9/29/11    HX GI      hemorrhoidectomy    HX HEENT      cataract    HX HERNIA REPAIR      HX OTHER SURGICAL      esophagus tumor removed    HX VASCULAR ACCESS Right     removed 2014    [place] COLSC FLX W/RMVL OF TUMOR POLYP LESION SNARE TQ  7/1/2010         [place] EGD BALLOON DILATION ESOPHAGUS <30 MM DIAM  3/3/2011         [place] EGD BALLOON DILATION ESOPHAGUS <30 MM DIAM  5/22/2012         [place] EGD TRANSORAL BIOPSY SINGLE/MULTIPLE  3/29/2012         UPPER GI ENDOSCOPY,BALL DIL,30MM  1/11/2018          No Known Allergies  Current Outpatient Prescriptions   Medication Sig Dispense Refill    esomeprazole (NEXIUM) 40 mg capsule TAKE 1 CAPSULE DAILY 90 Cap 1    folic acid-vit J3-OVK P03 (FOLGARD RX) 2.2-25-1 mg tab Take  by mouth.  lisinopril (PRINIVIL, ZESTRIL) 20 mg tablet TAKE 1 TABLET DAILY 90 Tab 3    hydroCHLOROthiazide (HYDRODIURIL) 12.5 mg tablet TAKE 1 TABLET EVERY OTHER DAY 45 Tab 2    cholecalciferol, VITAMIN D3, (VITAMIN D3) 5,000 unit tab tablet Take 5,000 Units by mouth daily.       VITAMINS A AND D PO Take 1 Cap by mouth daily.  sildenafil citrate (VIAGRA) 50 mg tablet Take 50 mg by mouth as needed.  aspirin 81 mg tablet Take 81 mg by mouth daily.  CHOLESTYRAMINE/ASPARTAME (PREVALITE PO) Take 5.5 g by mouth every evening.  ranitidine (ZANTAC) 150 mg tablet Take 150 mg by mouth every morning.  CYANOCOBALAMIN (VITAMIN B-12 PO) Take 1,000 mg by mouth daily. 2 tablets      pyridoxine (VITAMIN B-6) 100 mg tablet Take 100 mg by mouth daily.  MULTIVITAMINS (MULTIVITAMIN PO) Take  by mouth daily.  MOEXIPRIL HCL (MOEXIPRIL PO) Take 15 mg by mouth daily.        Social History     Social History    Marital status:      Spouse name: N/A    Number of children: N/A    Years of education: N/A     Social History Main Topics    Smoking status: Former Smoker     Packs/day: 2.00     Years: 25.00     Quit date: 1/1/1987    Smokeless tobacco: Never Used    Alcohol use No    Drug use: No    Sexual activity: Not Asked     Other Topics Concern    None     Social History Narrative     Family History   Problem Relation Age of Onset    Diabetes Mother     Arthritis-osteo Father     Cancer Sister      lung    Cancer Sister      breast       Health Maintenance   Topic Date Due    LIPID PANEL Q1  1940    FOOT EXAM Q1  01/02/1950    MICROALBUMIN Q1  01/02/1950    EYE EXAM RETINAL OR DILATED Q1  01/02/1950    DTaP/Tdap/Td series (1 - Tdap) 01/02/1961    ZOSTER VACCINE AGE 60>  11/02/1999    GLAUCOMA SCREENING Q2Y  01/02/2005    MEDICARE YEARLY EXAM  01/02/2005    Influenza Age 9 to Adult  08/01/2017    HEMOGLOBIN A1C Q6M  03/25/2018    Pneumococcal 65+ Low/Medium Risk  Completed        Review of Systems  Constitutional: negative for fevers, chills, anorexia and weight loss  Eyes:   negative for visual disturbance and irritation  ENT:   negative for tinnitus,sore throat,nasal congestion,ear pain,hoarseness  Respiratory:  negative for cough, hemoptysis, dyspnea,wheezing  CV:   negative for chest pain, palpitations, lower extremity edema  GI:   negative for nausea, vomiting, diarrhea, abdominal pain,melena  Endo:               negative for polyuria,polydipsia,polyphagia,heat intolerance  Genitourinary: negative for frequency, dysuria and hematuria  Integumentary: negative for rash and pruritus  Hematologic:  negative for easy bruising and gum/nose bleeding  Musculoskel: negative for myalgias, arthralgias, back pain, muscle weakness, joint pain  Neurological:  negative for headaches, dizziness, vertigo, memory problems and gait   Behavl/Psych: negative for feelings of anxiety, depression, mood changes  ROS otherwise negative      Objective:  Visit Vitals    /64 (BP 1 Location: Left arm, BP Patient Position: Sitting)    Pulse 65    Ht 5' 10\" (1.778 m)    Wt 170 lb (77.1 kg)    SpO2 99%    BMI 24.39 kg/m2     Body mass index is 24.39 kg/(m^2). Physical Exam:   General appearance - alert, well appearing, and in no distress  Mental status - alert, oriented to person, place, and time  EYE-LUÍS, EOMI,conjunctiva normal bilaterally, lids normal  ENT-ENT exam normal, no neck nodes or sinus tenderness  Nose - normal and patent, no erythema,  Or discharge   Mouth - mucous membranes moist, pharynx normal without lesions  Neck - supple, no significant adenopathy or bruit  Chest - clear to auscultation, no wheezes, rales or rhonchi. Heart - normal rate, regular rhythm, normal S1, S2, no murmurs, rubs, clicks or gallops   Abdomen - soft, nontender, nondistended, no masses or organomegaly  Lymph- no adenopathy palpable  Ext-peripheral pulses normal, no pedal edema, no clubbing or cyanosis  Skin-Warm and dry.  no hyperpigmentation, vitiligo, or suspicious lesions  Neuro -alert, oriented, normal speech, no focal findings or movement disorder noted      Assessment/Plan:  Diagnoses and all orders for this visit:    Essential hypertension  -     AMB POC COMPLETE CBC,AUTOMATED ENTER  -     AMB POC COMPREHENSIVE METABOLIC PANEL  -     AMB POC LIPID PROFILE  -     COLLECTION VENOUS BLOOD,VENIPUNCTURE  -     AMB POC URINALYSIS DIP STICK AUTO W/ MICRO     Type 2 diabetes mellitus without complication, without long-term current use of insulin (HCC)  -     AMB POC HEMOGLOBIN A1C  -     AMB POC TSH  -     AMB POC URINE, MICROALBUMIN, SEMIQUANT (1 RESULT)    GERD with stricture    History of esophageal cancer    History of colonic polyps    Vitamin D deficiency  -     AMB POC VITAMIN D    Drug-induced polyneuropathy (HCC)    BPH without obstruction/lower urinary tract symptoms  -     AMB POC PSA        Other instructions: The patient's medications are reviewed and reconciled. No change in his current medical regimen is made. A prudent diet and exercise is encouraged. Continue to check blood sugars once daily    Continue GI follow-up for recurrent strictures and colon polyps    Await results of multiple labs    Advanced care planning discussion occurred today    Follow-up 6 months    Follow-up Disposition:  Return in about 6 months (around 7/25/2018). I have reviewed with the patient details of the assessment and plan and all questions were answered. Relevent patient education was performed. The most recent lab findings were reviewed with the patient. An After Visit Summary was printed and given to the patient. Aries Moore MD        This is an Initial Medicare Annual Wellness Exam (AWV) (Performed 12 months after IPPE or effective date of Medicare Part B enrollment, Once in a lifetime)    I have reviewed the patient's medical history in detail and updated the computerized patient record.      History     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 1/16/2018    Cancer ([facility] [address] 2008    esophageal (chemo and radiation completed 2011)    Diverticulosis     Drug-induced polyneuropathy ([facility] [address] 1/16/2018    Due to chemotherapy    GERD with stricture 1/16/2018    Status post dilatation    Hx of colonic polyps     Hypertension     Kidney cyst     CHAVEZ (nonalcoholic steatohepatitis) 2000    \"fatty liver\" diet related    Personal history of colonic polyps 12/6/2012    Thrombocytopenia ([facility] [address]     r/t chemo    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    Vitamin D deficiency 1/16/2018      Past Surgical History:   Procedure Laterality Date    CHEST SURGERY PROCEDURE UNLISTED      esphogus removed; stomach in chest cavity    CHEST SURGERY PROCEDURE UNLISTED      left chcest prtacath    COLONOSCOPY N/A 6/23/2016    COL[facility]OS[facility] ENDOSCOPY    HX CHOLECYSTECTOMY  9/29/11    HX GI      hemorrhoidectomy    HX HEENT      cataract    HX HERNIA REPAIR      HX OTHER SURGICAL      esophagus tumor removed    HX VASCULAR ACCESS Right     removed 2014    [place] COLSC FLX W/RMVL OF TUMOR POLYP LESION SNARE TQ  7/1/2010         [place] EGD BALLOON DILATION ESOPHAGUS <30 MM DIAM  3/3/2011         [place] EGD BALLOON DILATION ESOPHAGUS <30 MM DIAM  5/22/2012         [place] EGD TRANSORAL BIOPSY SINGLE/MULTIPLE  3/29/2012         UPPER GI ENDOSCOPY,BALL DIL,30MM  1/11/2018          Current Outpatient Prescriptions   Medication Sig Dispense Refill    esomeprazole (NEXIUM) 40 mg capsule TAKE 1 CAPSULE DAILY 90 Cap 1    folic acid-vit S8-QOL Z37 (FOLGARD RX) 2.2-25-1 mg tab Take  by mouth.  lisinopril (PRINIVIL, ZESTRIL) 20 mg tablet TAKE 1 TABLET DAILY 90 Tab 3    hydroCHLOROthiazide (HYDRODIURIL) 12.5 mg tablet TAKE 1 TABLET EVERY OTHER DAY 45 Tab 2    cholecalciferol, VITAMIN D3, (VITAMIN D3) 5,000 unit tab tablet Take 5,000 Units by mouth daily.  VITAMINS A AND D PO Take 1 Cap by mouth daily.  sildenafil citrate (VIAGRA) 50 mg tablet Take 50 mg by mouth as needed.  aspirin 81 mg tablet Take 81 mg by mouth daily.         CHOLESTYRAMINE/ASPARTAME (PREVALITE PO) Take 5.5 g by mouth every evening.  ranitidine (ZANTAC) 150 mg tablet Take 150 mg by mouth every morning.  CYANOCOBALAMIN (VITAMIN B-12 PO) Take 1,000 mg by mouth daily. 2 tablets      pyridoxine (VITAMIN B-6) 100 mg tablet Take 100 mg by mouth daily.  MULTIVITAMINS (MULTIVITAMIN PO) Take  by mouth daily.  MOEXIPRIL HCL (MOEXIPRIL PO) Take 15 mg by mouth daily. No Known Allergies  Family History   Problem Relation Age of Onset    Diabetes Mother     Arthritis-osteo Father     Cancer Sister      lung    Cancer Sister      breast     Social History   Substance Use Topics    Smoking status: Former Smoker     Packs/day: 2.00     Years: 25.00     Quit date: 1/1/1987    Smokeless tobacco: Never Used    Alcohol use No     Patient Active Problem List   Diagnosis Code    History of esophageal cancer Z85.01    Common bile duct stone K80.50    Choledocholithiasis K80.50    Diabetes mellitus ([facility] [address] E11.9    History of colonic polyps Z86.010    Dysphagia R13.10    BPH without obstruction/lower urinary tract symptoms N40.0    Diverticulosis K57.90    GERD with stricture K21.9, K22.2    Hypertension I10    Drug-induced polyneuropathy ([facility] [address] G62.0    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 Functional Ability and Level of Safety:     Hearing Loss  Hearing is good. Activities of Daily Living  The home contains: [employer]  Patient does total self care    Fall Risk  Fall Risk Assessment, last 12 mths 1/25/2018   Able to walk? Yes   Fall in past 12 months?  No       Abuse Screen  Patient is not abused    Cognitive Screening   Evaluation of Cognitive Function:  Has your family/caregiver stated any concerns about your memory: no  Normal    Patient Care Team   Patient Care Team:  Anton Smart MD as PCP - General (Internal Medicine)    Assessment/Plan   Education and counseling provided:  Are appropriate based on today's review and evaluation  End-of-Life planning (with patient's consent)    Diagnoses and all orders for this visit:    1. Essential hypertension  -     AMB POC COMPLETE CBC,AUTOMATED ENTER  -     AMB POC COMPREHENSIVE METABOLIC PANEL  -     AMB POC LIPID PROFILE  -     COLLECTION VENOUS BLOOD,VENIPUNCTURE  -     AMB POC URINALYSIS DIP STICK AUTO W/ MICRO     2. Type 2 diabetes mellitus without complication, without long-term current use of insulin (HCC)  -     AMB POC HEMOGLOBIN A1C  -     AMB POC TSH  -     AMB POC URINE, MICROALBUMIN, SEMIQUANT (1 RESULT)    3. GERD with stricture    4. History of esophageal cancer    5. History of colonic polyps    6. Vitamin D deficiency  -     AMB POC VITAMIN D    7. Drug-induced polyneuropathy ([facility] [address]    8. BPH without obstruction/lower urinary tract symptoms  -     AMB POC PSA    9. Initial Medicare annual wellness visit    10.  Screening for alcoholism  -     Annual  Alcohol Screen 15 min ()         Health Maintenance Due   Topic Date Due    LIPID PANEL Q1  1940    FOOT EXAM Q1  01/02/1950    MICROALBUMIN Q1  01/02/1950    EYE EXAM RETINAL OR DILATED Q1  01/02/1950    DTaP/Tdap/Td series (1 - Tdap) 01/02/1961    ZOSTER VACCINE AGE 60>  11/02/1999    GLAUCOMA SCREENING Q2Y  01/02/2005    MEDICARE YEARLY EXAM  01/02/2005    Influenza Age 9 to Adult  08/01/2017

## 2018-01-25 NOTE — ACP (ADVANCE CARE PLANNING)
Advanced care planning occurred at this office visit today. The patient does not have advanced directives and states that they will not be doing this as he is depending on his wife to make decisions for him.

## 2018-01-25 NOTE — PATIENT INSTRUCTIONS
Diabetes Foot Health: Care Instructions  Your Care Instructions    When you have diabetes, your feet need extra care and attention. Diabetes can damage the nerve endings and blood vessels in your feet, making you less likely to notice when your feet are injured. Diabetes also limits your body's ability to fight infection and get blood to areas that need it. If you get a minor foot injury, it could become an ulcer or a serious infection. With good foot care, you can prevent most of these problems. Caring for your feet can be quick and easy. Most of the care can be done when you are bathing or getting ready for bed. Follow-up care is a key part of your treatment and safety. Be sure to make and go to all appointments, and call your doctor if you are having problems. It's also a good idea to know your test results and keep a list of the medicines you take. How can you care for yourself at home? · Keep your blood sugar close to normal by watching what and how much you eat, monitoring blood sugar, taking medicines if prescribed, and getting regular exercise. · Do not smoke. Smoking affects blood flow and can make foot problems worse. If you need help quitting, talk to your doctor about stop-smoking programs and medicines. These can increase your chances of quitting for good. · Eat a diet that is low in fats. High fat intake can cause fat to build up in your blood vessels and decrease blood flow. · Inspect your feet daily for blisters, cuts, cracks, or sores. If you cannot see well, use a mirror or have someone help you. · Take care of your feet:  [facility] your feet every day. Use warm (not hot) water. Check the water temperature with your wrists or other part of your body, not your feet. ¨ Dry your feet well. Pat them dry. Do not rub the skin on your feet too hard. Dry well between your toes. If the skin on your feet stays moist, bacteria or a fungus can grow, which can lead to infection.   ¨ Keep your skin soft. Use moisturizing skin cream to keep the skin on your feet soft and prevent calluses and cracks. But do not put the cream between your toes, and stop using any cream that causes a rash. ¨ Clean underneath your toenails carefully. Do not use a sharp object to clean underneath your toenails. Use the blunt end of a nail file or other rounded tool. ¨ Trim and file your toenails straight across to prevent ingrown toenails. Use a nail clipper, not scissors. Use an emery board to smooth the edges. · Change socks daily. Socks without seams are best, because seams often rub the feet. You can find socks for people with diabetes from specialty catalogs. · Look inside your shoes every day for things like gravel or torn linings, which could cause blisters or sores. · Buy shoes that fit well:  ¨ Look for shoes that have plenty of space around the toes. This helps prevent bunions and blisters. ¨ Try on shoes while wearing the kind of socks you will usually wear with the shoes. ¨ Avoid plastic shoes. They may rub your feet and cause blisters. Good shoes should be made of materials that are flexible and breathable, such as leather or cloth. ¨ Break in new shoes slowly by wearing them for no more than an hour a day for several days. Take extra time to check your feet for red areas, blisters, or other problems after you wear new shoes. · Do not go barefoot. Do not wear sandals, and do not wear shoes with very thin soles. Thin soles are easy to puncture. They also do not protect your feet from hot pavement or cold weather. · Have your doctor check your feet during each visit. If you have a foot problem, see your doctor. Do not try to treat an early foot problem at home. Home remedies or treatments that you can buy without a prescription (such as corn removers) can be harmful. · Always get early treatment for foot problems. A minor irritation can lead to a major problem if not properly cared for early.   When should you call for help? Call your doctor now or seek immediate medical care if:  ? · You have a foot sore, an ulcer or break in the skin that is not healing after 4 days, bleeding corns or calluses, or an ingrown toenail. ? · You have blue or black areas, which can mean bruising or blood flow problems. ? · You have peeling skin or tiny blisters between your toes or cracking or oozing of the skin. ? · You have a fever for more than 24 hours and a foot sore. ? · You have new numbness or tingling in your feet that does not go away after you move your feet or change positions. ? · You have unexplained or unusual swelling of the foot or ankle. ? Watch closely for changes in your health, and be sure to contact your doctor if:  ? · You cannot do proper foot care. Where can you learn more? Go to http://pavan-erna.info/. Enter A739 in the search box to learn more about \"Diabetes Foot Health: Care Instructions. \"  Current as of: March 13, 2017  Content Version: 11.4  © 1484-8803 [employer]. Care instructions adapted under license by [employer] (which disclaims liability or warranty for this information). If you have questions about a medical condition or this instruction, always ask your healthcare professional. [address] any warranty or liability for your use of this information.

## 2018-01-25 NOTE — PROGRESS NOTES
Nayla Martinez is a 66 y.o. male presenting for Complete Physical  .     1. Have you been to the ER, urgent care clinic since your last visit? Hospitalized since your last visit? No    2. Have you seen or consulted any other health care providers outside of the [address] [place] [address] since your last visit? Include any pap smears or colon screening. No    Fall Risk Assessment, last 12 mths 1/25/2018   Able to walk? Yes   Fall in past 12 months? No         Abuse Screening Questionnaire 1/25/2018   Do you ever feel afraid of your partner? N   Are you in a relationship with someone who physically or mentally threatens you? N   Is it safe for you to go home?  Y       PHQ over the last two weeks 1/25/2018   Little interest or pleasure in doing things Not at all   Feeling down, depressed or hopeless Not at all   Total Score PHQ 2 0       Medications Discontinued During This Encounter   Medication Reason    hydrochlorothiazide (MICROZIDE) 57.5 mg capsule Duplicate Order

## 2018-01-31 ENCOUNTER — TELEPHONE (OUTPATIENT)
Dept: INTERNAL MEDICINE CLINIC | Age: 78
End: 2018-01-31

## 2018-07-19 RX ORDER — ESOMEPRAZOLE MAGNESIUM 40 MG/1
CAPSULE, DELAYED RELEASE ORAL
Qty: 90 CAP | Refills: 1 | Status: SHIPPED | OUTPATIENT
Start: 2018-07-19 | End: 2019-01-18 | Stop reason: SDUPTHER

## 2018-07-19 RX ORDER — HYDROCHLOROTHIAZIDE 12.5 MG/1
TABLET ORAL
Qty: 45 TAB | Refills: 2 | Status: SHIPPED | OUTPATIENT
Start: 2018-07-19 | End: 2019-04-15 | Stop reason: SDUPTHER

## 2018-08-01 ENCOUNTER — OFFICE VISIT (OUTPATIENT)
Dept: INTERNAL MEDICINE CLINIC | Age: 78
End: 2018-08-01

## 2018-08-01 VITALS
OXYGEN SATURATION: 98 % | HEART RATE: 62 BPM | HEIGHT: 70 IN | DIASTOLIC BLOOD PRESSURE: 70 MMHG | WEIGHT: 165.2 LBS | BODY MASS INDEX: 23.65 KG/M2 | SYSTOLIC BLOOD PRESSURE: 116 MMHG

## 2018-08-01 DIAGNOSIS — K21.9 GERD WITH STRICTURE: Chronic | ICD-10-CM

## 2018-08-01 DIAGNOSIS — I10 ESSENTIAL HYPERTENSION: Chronic | ICD-10-CM

## 2018-08-01 DIAGNOSIS — K22.2 GERD WITH STRICTURE: Chronic | ICD-10-CM

## 2018-08-01 DIAGNOSIS — E11.9 TYPE 2 DIABETES MELLITUS WITHOUT COMPLICATION, WITHOUT LONG-TERM CURRENT USE OF INSULIN (HCC): Primary | Chronic | ICD-10-CM

## 2018-08-01 DIAGNOSIS — E55.9 VITAMIN D DEFICIENCY: Chronic | ICD-10-CM

## 2018-08-01 DIAGNOSIS — G62.0 DRUG-INDUCED POLYNEUROPATHY (HCC): Chronic | ICD-10-CM

## 2018-08-01 LAB
BACTERIA UA POCT, BACTPOCT: NORMAL
BILIRUB UR QL STRIP: NEGATIVE
CASTS UA POCT: 0
CLUE CELLS, CLUEPOCT: NEGATIVE
CRYSTALS UA POCT, CRYSPOCT: NEGATIVE
EPITHELIAL CELLS POCT: NEGATIVE
GLUCOSE UR-MCNC: NEGATIVE MG/DL
GRAN# POC: 3.7 K/UL (ref 2–7.8)
GRAN% POC: 59.2 % (ref 37–92)
HCT VFR BLD CALC: 42.6 % (ref 37–51)
HGB BLD-MCNC: 14.2 G/DL (ref 12–18)
KETONES P FAST UR STRIP-MCNC: NEGATIVE MG/DL
LY# POC: 2.2 K/UL (ref 0.6–4.1)
LY% POC: 36.3 % (ref 10–58.5)
MCH RBC QN: 29.2 PG (ref 26–32)
MCHC RBC-ENTMCNC: 33.3 G/DL (ref 30–36)
MCV RBC: 88 FL (ref 80–97)
MICROALBUMIN UR TEST STR-MCNC: NEGATIVE MG/L (ref 0–20)
MID #, POC: 0.2 K/UL (ref 0–1.8)
MID% POC: 4.5 % (ref 0.1–24)
MUCUS UA POCT, MUCPOCT: NORMAL
PH UR STRIP: 6 [PH] (ref 5–7)
PLATELET # BLD: 168 K/UL (ref 140–440)
PROT UR QL STRIP: NEGATIVE
RBC # BLD: 4.85 M/UL (ref 4.2–6.3)
RBC UA POCT, RBCPOCT: 0
SP GR UR STRIP: 1.01 (ref 1.01–1.02)
TRICH UA POCT, TRICHPOC: NEGATIVE
UA UROBILINOGEN AMB POC: NORMAL (ref 0.2–1)
URINALYSIS CLARITY POC: CLEAR
URINALYSIS COLOR POC: NORMAL
URINE BLOOD POC: NEGATIVE
URINE CULT COMMENT, POCT: NORMAL
URINE LEUKOCYTES POC: NEGATIVE
URINE NITRITES POC: NEGATIVE
WBC # BLD: 6.1 K/UL (ref 4.1–10.9)
WBC UA POCT, WBCPOCT: 0
YEAST UA POCT, YEASTPOC: NEGATIVE

## 2018-08-01 NOTE — PROGRESS NOTES
This note will not be viewable in [address] Padmaja Andujar is a 66 y.o. male and presents with Hypertension (6 mo fu) Mari Morales Subjective: 
Mr. Orvel Galeazzi returns to our office today in follow-up of multiple medical problems. The patient has type 2 diabetes mellitus for which she had previously been on oral medication but is now controlling this with weight loss and diet. He checks his blood sugars daily and notes an average fasting blood sugar in the 90s. The patient has had a diabetic retinal eye exam done within the last year and he denies any numbness or burning related to diabetes but does have a history of a drug-induced polyneuropathy. He has hypertension and he is on hydrochlorothiazide and lisinopril. He denies any cough, swelling, rash, dizziness or lower extremity edema. He has had no headaches, numbness, tingling or focal neurological problems. The patient has significant GERD with previous stricture. He is on both PPI and Zantac therapy. This controls his symptoms well and he has had no breakthrough heartburn and denies dysphasia, early satiety or unexplained weight loss. He has vitamin D deficiency and continues to supplement himself with this. He has no history of osteoporosis, falls or fractures. Past Medical History:  
Diagnosis Date  Anemia   
 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  
 esophageal (chemo and radiation completed 2011)  Diverticulosis  Drug-induced polyneuropathy ([address] 1/16/2018 Due to chemotherapy  GERD with stricture 1/16/2018 Status post dilatation  Hx of colonic polyps  Hypertension  Kidney cyst   
 CHAVEZ (nonalcoholic steatohepatitis) 2000 \"fatty liver\" diet related  Personal history of colonic polyps 12/6/2012  Thrombocytopenia ([address]   
 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  
Procedure Laterality Date  CHEST SURGERY PROCEDURE UNLISTED    
 esphogus removed; stomach in chest cavity  CHEST SURGERY PROCEDURE UNLISTED    
 left chcest prtacath  COLONOSCOPY N/A 6/23/2016 COLONOSCOPY performed by Kelli Saucedo MD at [facility] ENDOSCOPY  
 HX CHOLECYSTECTOMY  9/29/11  
 HX GI    
 hemorrhoidectomy  HX HEENT    
 cataract  HX HERNIA REPAIR    
 HX OTHER SURGICAL    
 esophagus tumor removed  HX VASCULAR ACCESS Right   
 removed 2014  [place] COL[place] FLX W/RMVL OF TUMOR POLYP LESION SNARE TQ  7/1/2010  [place] EGD BALLOON DILATION ESOPHAGUS <30 MM DIAM  3/3/2011  [place] EGD BALLOON DILATION ESOPHAGUS <30 MM DIAM  5/22/2012  [place] EGD TRANSORAL BIOPSY SINGLE/MULTIPLE  3/29/2012  UPPER GI ENDOSCOPY,BALL DIL,30MM  1/11/2018 No Known Allergies Current Outpatient Prescriptions Medication Sig Dispense Refill  esomeprazole (NEXIUM) 40 mg capsule TAKE 1 CAPSULE DAILY 90 Cap 1  
 hydroCHLOROthiazide (HYDRODIURIL) 12.5 mg tablet TAKE 1 TABLET EVERY OTHER DAY 45 Tab 2  
 folic acid-vit W6-BXF N46 (FOLGARD RX) 2.2-25-1 mg tab Take  by mouth.  lisinopril (PRINIVIL, ZESTRIL) 20 mg tablet TAKE 1 TABLET DAILY 90 Tab 3  cholecalciferol, VITAMIN D3, (VITAMIN D3) 5,000 unit tab tablet Take 5,000 Units by mouth daily.  VITAMINS A AND D PO Take 1 Cap by mouth daily.  sildenafil citrate (VIAGRA) 50 mg tablet Take 50 mg by mouth as needed.  aspirin 81 mg tablet Take 81 mg by mouth daily.  CHOLESTYRAMINE/ASPARTAME (PREVALITE PO) Take 5.5 g by mouth every evening.  ranitidine (ZANTAC) 150 mg tablet Take 150 mg by mouth every morning.  CYANOCOBALAMIN (VITAMIN B-12 PO) Take 1,000 mg by mouth daily. 2 tablets  pyridoxine (VITAMIN B-6) 100 mg tablet Take 100 mg by mouth daily.  MULTIVITAMINS (MULTIVITAMIN PO) Take  by mouth daily. Social History Social History  Marital status:  Spouse name: N/A  
 Number of children: N/A  
 Years of education: N/A Social History Main Topics  Smoking status: Former Smoker Packs/day: 2.00 Years: 25.00 Quit date: 1/1/1987  Smokeless tobacco: Never Used  Alcohol use No  
 Drug use: No  
 Sexual activity: Not Asked Other Topics Concern  None Social History Narrative Family History Problem Relation Age of Onset  Diabetes Mother  Arthritis-osteo Father  Cancer Sister   
  lung  Cancer Sister   
  breast  
 
 
Health Maintenance Topic Date Due  
 FOOT EXAM Q1  01/02/1950  DTaP/Tdap/Td series (1 - Tdap) 01/02/1961  ZOSTER VACCINE AGE 60>  11/02/1999  
 HEMOGLOBIN A1C Q6M  07/25/2018  Influenza Age 5 to Adult  08/01/2018  MICROALBUMIN Q1  01/25/2019  LIPID PANEL Q1  01/25/2019  MEDICARE YEARLY EXAM  01/26/2019  
 EYE EXAM RETINAL OR DILATED Q1  03/19/2019  GLAUCOMA SCREENING Q2Y  03/19/2020  Pneumococcal 65+ Low/Medium Risk  Completed Review of Systems Constitutional: negative for fevers, chills, anorexia and weight loss Eyes:   negative for visual disturbance and irritation ENT:   negative for tinnitus,sore throat,nasal congestion,ear pain,hoarseness Respiratory:  negative for cough, hemoptysis, dyspnea,wheezing CV:   negative for chest pain, palpitations, lower extremity edema GI:   negative for nausea, vomiting, diarrhea, abdominal pain,melena Endo:               negative for polyuria,polydipsia,polyphagia,heat intolerance Genitourinary: negative for frequency, dysuria and hematuria Integumentary: negative for rash and pruritus Hematologic:  negative for easy bruising and gum/nose bleeding Musculoskel: negative for myalgias, arthralgias, back pain, muscle weakness, joint pain Neurological:  negative for headaches, dizziness, vertigo, memory problems and gait Behavl/Psych: negative for feelings of anxiety, depression, mood changes ROS otherwise negative Objective: 
Visit Vitals  /70 (BP 1 Location: Right arm, BP Patient Position: Sitting)  Pulse 62  Ht 5' 10\" (1.778 m)  Wt 165 lb 3.2 oz (74.9 kg)  SpO2 98%  BMI 23.7 kg/m2 Body mass index is 23.7 kg/(m^2). Physical Exam:  
General appearance - alert, well appearing, and in no distress Mental status - alert, oriented to person, place, and time EYE-LUÍS, EOMI,conjunctiva normal bilaterally, lids normal 
ENT-ENT exam normal, no neck nodes or sinus tenderness Nose - normal and patent, no erythema,  Or discharge Mouth - mucous membranes moist, pharynx normal without lesions Neck - supple, no significant adenopathy or bruit Chest - clear to auscultation, no wheezes, rales or rhonchi. Heart - normal rate, regular rhythm, normal S1, S2, no murmurs, rubs, clicks or gallops Abdomen - soft, nontender, nondistended, no masses or organomegaly Lymph- no adenopathy palpable Ext-peripheral pulses normal, no pedal edema, no clubbing or cyanosis Skin-Warm and dry. no hyperpigmentation, vitiligo, or suspicious lesions Neuro -alert, oriented, normal speech, no focal findings or movement disorder noted Diabetic foot exam:  
 
Left Foot: 
 Visual Exam: normal  
 Pulse DP: 2+ (normal) Filament test: normal sensation Vibratory sensation: normal 
   
Right Foot: 
 Visual Exam: normal  
 Pulse DP: 2+ (normal) Filament test: normal sensation Vibratory sensation: normal 
 
 
Assessment/Plan: 
Diagnoses and all orders for this visit: 
 
Type 2 diabetes mellitus without complication, without long-term current use of insulin ([address] -     AMB POC URINE, MICROALBUMIN, SEMIQUANT (1 RESULT) 
-     HEMOGLOBIN A1C W/O EAG 
-     TSH 3RD GENERATION Essential hypertension -     AMB POC COMPLETE CBC,AUTOMATED ENTER 
-     COLLECTION VENOUS BLOOD,VENIPUNCTURE 
-     AMB POC URINALYSIS DIP STICK AUTO W/ MICRO  
-     LIPID PANEL 
-     METABOLIC PANEL, COMPREHENSIVE Vitamin D deficiency 
-     VITAMIN D, 25 HYDROXY 
 
GERD with stricture Drug-induced polyneuropathy ([facility] [address] Other instructions: The patient's medications are reviewed and reconciled. No change in his current medical regimen is made. A no added salt, prudent diet is encouraged Continue to check blood sugars once daily Await results of multiple labs Follow-up 6 months Follow-up Disposition: 
Return in about 6 months (around 2/1/2019). I have reviewed with the patient details of the assessment and plan and all questions were answered. Relevent patient education was performed. The most recent lab findings were reviewed with the patient. An After Visit Summary was printed and given to the patient.  
 
Flora Martinez MD

## 2018-08-01 NOTE — MR AVS SNAPSHOT
[facility] 
 
 
 [address] [id number] [phone] Patient: Alexa Alan MRN: [id number] CARIE:[birth year] Visit Information Date & Time Provider Department Dept. Phone Encounter #  
 8/1/2018  8:30 AM Silvana Stiles [phone] [id number] Follow-up Instructions Return in about 6 months (around 2/1/2019). Your Appointments 2/5/2019  8:30 AM  
FOLLOW UP 10 with MD Silvana [address] ([address]) Appt Note: 6 month follow up appointment [address] [id number] [address] [facility] [address] Upcoming Health Maintenance Date Due  
 FOOT EXAM Q1 1/2/1950 DTaP/Tdap/Td series (1 - Tdap) 1/2/1961 ZOSTER VACCINE AGE 60> 11/2/1999 HEMOGLOBIN A1C Q6M 7/25/2018 Influenza Age 5 to Adult 8/1/2018 MICROALBUMIN Q1 1/25/2019 LIPID PANEL Q1 1/25/2019 MEDICARE YEARLY EXAM 1/26/2019 EYE EXAM RETINAL OR DILATED Q1 3/19/2019 GLAUCOMA SCREENING Q2Y 3/19/2020 Allergies as of 8/1/2018  Review Complete On: 8/1/2018 By: Jeremy Bal MD  
 No Known Allergies Current Immunizations  Reviewed on 9/2/2011 Name Date Influenza High Dose Vaccine PF 9/1/2017 Influenza Vaccine 9/30/2016, 10/13/2015 Influenza Vaccine Split 9/26/2011 Pneumococcal Conjugate (PCV-13) 11/30/2015 Pneumococcal Polysaccharide (PPSV-23) 9/2/2009, 10/23/2008 Not reviewed this visit You Were Diagnosed With   
  
 Codes Comments Type 2 diabetes mellitus without complication, without long-term current use of insulin (HCC)    -  Primary ICD-10-CM: E11.9 ICD-9-CM: 250.00 Essential hypertension     ICD-10-CM: I10 
ICD-9-CM: 401.9 Vitamin D deficiency     ICD-10-CM: E55.9 ICD-9-CM: 268.9 GERD with stricture     ICD-10-CM: K21.9, K22.2 ICD-9-CM: 530.81, 530.3 Drug-induced polyneuropathy ([facility] [address]     ICD-10-CM: G62.0 ICD-9-CM: 357.6, E980.5 Vitals BP Pulse Height(growth percentile) Weight(growth percentile) SpO2 BMI  
 116/70 (BP 1 Location: Right arm, BP Patient Position: Sitting) 62 5' 10\" (1.778 m) 165 lb 3.2 oz (74.9 kg) 98% 23.7 kg/m2 Smoking Status Former Smoker BMI and BSA Data Body Mass Index Body Surface Area  
 23.7 kg/m 2 1.92 m 2 Preferred Pharmacy Pharmacy Name Phone [facility], [facility] [phone] Your Updated Medication List  
  
   
This list is accurate as of 8/1/18  8:30 AM.  Always use your most recent med list.  
  
  
  
  
 aspirin 81 mg tablet Take 81 mg by mouth daily. cholecalciferol (VITAMIN D3) 5,000 unit Tab tablet Commonly known as:  VITAMIN D3 Take 5,000 Units by mouth daily. esomeprazole 40 mg capsule Commonly known as:  NEXIUM  
TAKE 1 CAPSULE DAILY FOLGARD RX 2.2-25-1 mg Tab Generic drug:  folic acid-vit R2-ETHAN K92 Take  by mouth. hydroCHLOROthiazide 12.5 mg tablet Commonly known as:  HYDRODIURIL  
TAKE 1 TABLET EVERY OTHER DAY  
  
 lisinopril 20 mg tablet Commonly known as:  PRINIVIL, ZESTRIL  
TAKE 1 TABLET DAILY MULTIVITAMIN PO Take  by mouth daily. PREVALITE PO Take 5.5 g by mouth every evening. sildenafil citrate 50 mg tablet Commonly known as:  VIAGRA Take 50 mg by mouth as needed. VITAMIN B-12 PO Take 1,000 mg by mouth daily. 2 tablets VITAMIN B-6 100 mg tablet Generic drug:  pyridoxine (vitamin B6) Take 100 mg by mouth daily. VITAMINS A AND D PO Take 1 Cap by mouth daily. ZANTAC 150 mg tablet Generic drug:  raNITIdine Take 150 mg by mouth every morning. We Performed the Following AMB POC COMPLETE CBC,AUTOMATED ENTER [id number] CPT(R)] AMB POC URINALYSIS DIP STICK AUTO W/ MICRO  [80019 CPT(R)] AMB POC URINE, MICROALBUMIN, SEMIQUANT (1 RESULT) [35675 CPT(R)] COLLECTION VENOUS BLOOD,VENIPUNCTURE [id number] CPT(R)] HEMOGLOBIN A1C W/O EAG [25723 CPT(R)] LIPID PANEL [79825 CPT(R)] METABOLIC PANEL, COMPREHENSIVE [03283 CPT(R)] TSH 3RD GENERATION [88724 CPT(R)] VITAMIN D, 25 HYDROXY [id number] CPT(R)] Follow-up Instructions Return in about 6 months (around 2/1/2019). Introducing [facility] SERVICES! Dear Jossue Gupta: Thank you for requesting a [employer] account. Our records indicate that you already have an active [employer] account. You can access your account anytime at [email or web address] Did you know that you can access your hospital and ER discharge instructions at any time in [employer]? You can also review all of your test results from your hospital stay or ER visit. Additional Information If you have questions, please visit the Frequently Asked Questions section of the [employer] website at [email or web address] Remember, [employer] is NOT to be used for urgent needs. For medical emergencies, dial 911. Now available from your iPhone and Android! Please provide this summary of care documentation to your next provider. Your primary care clinician is listed as THERON Stewart. If you have any questions after today's visit, please call [phone].

## 2018-08-01 NOTE — PROGRESS NOTES
Esther Hanna is a 66 y.o. male presenting for Hypertension (6 mo fu) Natalya Arzola 1. Have you been to the ER, urgent care clinic since your last visit? Hospitalized since your last visit? No 
 
2. Have you seen or consulted any other health care providers outside of the [address] [place] [address] since your last visit? Include any pap smears or colon screening. Dr Oseguera Nye for skin check 7-30-18. Fall Risk Assessment, last 12 mths 1/25/2018 Able to walk? Yes Fall in past 12 months? No  
 
 
 
Abuse Screening Questionnaire 1/25/2018 Do you ever feel afraid of your partner? Juan Carlos Lazo Are you in a relationship with someone who physically or mentally threatens you? Juan Carlos Lazo Is it safe for you to go home? Y  
 
 
PHQ over the last two weeks 1/25/2018 Little interest or pleasure in doing things Not at all Feeling down, depressed, irritable, or hopeless Not at all Total Score PHQ 2 0 There are no discontinued medications.

## 2018-08-01 NOTE — PATIENT INSTRUCTIONS

## 2018-08-02 LAB
25(OH)D3+25(OH)D2 SERPL-MCNC: 52.5 NG/ML (ref 30–100)
ALBUMIN SERPL-MCNC: 4.3 G/DL (ref 3.5–4.8)
ALBUMIN/GLOB SERPL: 1.5 {RATIO} (ref 1.2–2.2)
ALP SERPL-CCNC: 85 IU/L (ref 39–117)
ALT SERPL-CCNC: 10 IU/L (ref 0–44)
AST SERPL-CCNC: 24 IU/L (ref 0–40)
BILIRUB SERPL-MCNC: 0.4 MG/DL (ref 0–1.2)
BUN SERPL-MCNC: 19 MG/DL (ref 8–27)
BUN/CREAT SERPL: 13 (ref 10–24)
CALCIUM SERPL-MCNC: 10.1 MG/DL (ref 8.6–10.2)
CHLORIDE SERPL-SCNC: 102 MMOL/L (ref 96–106)
CHOLEST SERPL-MCNC: 144 MG/DL (ref 100–199)
CO2 SERPL-SCNC: 24 MMOL/L (ref 20–29)
CREAT SERPL-MCNC: 1.49 MG/DL (ref 0.76–1.27)
GLOBULIN SER CALC-MCNC: 2.8 G/DL (ref 1.5–4.5)
GLUCOSE SERPL-MCNC: 97 MG/DL (ref 65–99)
HBA1C MFR BLD: 6 % (ref 4.8–5.6)
HDLC SERPL-MCNC: 54 MG/DL
LDLC SERPL CALC-MCNC: 73 MG/DL (ref 0–99)
POTASSIUM SERPL-SCNC: 5 MMOL/L (ref 3.5–5.2)
PROT SERPL-MCNC: 7.1 G/DL (ref 6–8.5)
SODIUM SERPL-SCNC: 138 MMOL/L (ref 134–144)
TRIGL SERPL-MCNC: 85 MG/DL (ref 0–149)
TSH SERPL DL<=0.005 MIU/L-ACNC: 0.9 UIU/ML (ref 0.45–4.5)
VLDLC SERPL CALC-MCNC: 17 MG/DL (ref 5–40)

## 2018-09-26 ENCOUNTER — CLINICAL SUPPORT (OUTPATIENT)
Dept: INTERNAL MEDICINE CLINIC | Age: 78
End: 2018-09-26

## 2018-09-26 DIAGNOSIS — Z23 ENCOUNTER FOR IMMUNIZATION: Primary | ICD-10-CM

## 2018-09-26 NOTE — PROGRESS NOTES
After obtaining consent, and per orders of Dr. Nahomi Tsai, injection of FluAd influenza vaccine given by Ofelia Ramey LPN.

## 2018-10-18 RX ORDER — LISINOPRIL 20 MG/1
TABLET ORAL
Qty: 90 TAB | Refills: 3 | Status: SHIPPED | OUTPATIENT
Start: 2018-10-18 | End: 2019-04-29 | Stop reason: ALTCHOICE

## 2018-11-01 ENCOUNTER — TELEPHONE (OUTPATIENT)
Dept: INTERNAL MEDICINE CLINIC | Age: 78
End: 2018-11-01

## 2018-11-01 NOTE — TELEPHONE ENCOUNTER
Patients wife is calling, she states that the patient has been coughing horribly for the past week and she has been giving the patient coricidin for this. He has an appointment scheduled for tomorrow 11/1, but would like to know if anything else can be taken OTC before then.     Best call back # for Fran Colbert: [phone]

## 2018-11-09 ENCOUNTER — OFFICE VISIT (OUTPATIENT)
Dept: INTERNAL MEDICINE CLINIC | Age: 78
End: 2018-11-09

## 2018-11-09 VITALS
HEIGHT: 70 IN | SYSTOLIC BLOOD PRESSURE: 124 MMHG | BODY MASS INDEX: 23.85 KG/M2 | TEMPERATURE: 98.1 F | DIASTOLIC BLOOD PRESSURE: 64 MMHG | WEIGHT: 166.6 LBS

## 2018-11-09 DIAGNOSIS — J20.9 ACUTE BRONCHITIS, UNSPECIFIED ORGANISM: Primary | ICD-10-CM

## 2018-11-09 RX ORDER — BENZONATATE 200 MG/1
200 CAPSULE ORAL
Qty: 30 CAP | Refills: 0 | Status: SHIPPED | OUTPATIENT
Start: 2018-11-09 | End: 2018-11-16

## 2018-11-09 RX ORDER — CEFUROXIME AXETIL 500 MG/1
500 TABLET ORAL 2 TIMES DAILY
Qty: 20 TAB | Refills: 0 | Status: SHIPPED | OUTPATIENT
Start: 2018-11-09 | End: 2019-02-05 | Stop reason: ALTCHOICE

## 2018-11-09 NOTE — PATIENT INSTRUCTIONS
Bronchitis: Care Instructions Your Care Instructions Bronchitis is inflammation of the bronchial tubes, which carry air to the lungs. The tubes swell and produce mucus, or phlegm. The mucus and inflamed bronchial tubes make you cough. You may have trouble breathing. Most cases of bronchitis are caused by viruses like those that cause colds. Antibiotics usually do not help and they may be harmful. Bronchitis usually develops rapidly and lasts about 2 to 3 weeks in otherwise healthy people. Follow-up care is a key part of your treatment and safety. Be sure to make and go to all appointments, and call your doctor if you are having problems. It's also a good idea to know your test results and keep a list of the medicines you take. How can you care for yourself at home? · Take all medicines exactly as prescribed. Call your doctor if you think you are having a problem with your medicine. · Get some extra rest. 
· Take an over-the-counter pain medicine, such as acetaminophen (Tylenol), ibuprofen (Advil, Motrin), or naproxen (Aleve) to reduce fever and relieve body aches. Read and follow all instructions on the label. · Do not take two or more pain medicines at the same time unless the doctor told you to. Many pain medicines have acetaminophen, which is Tylenol. Too much acetaminophen (Tylenol) can be harmful. · Take an over-the-counter cough medicine that contains dextromethorphan to help quiet a dry, hacking cough so that you can sleep. Avoid cough medicines that have more than one active ingredient. Read and follow all instructions on the label. · Breathe moist air from a humidifier, hot shower, or sink filled with hot water. The heat and moisture will thin mucus so you can cough it out. · Do not smoke. Smoking can make bronchitis worse. If you need help quitting, talk to your doctor about stop-smoking programs and medicines. These can increase your chances of quitting for good. When should you call for help? Call 911 anytime you think you may need emergency care. For example, call if: 
  · You have severe trouble breathing.  
 Call your doctor now or seek immediate medical care if: 
  · You have new or worse trouble breathing.  
  · You cough up dark brown or bloody mucus (sputum).  
  · You have a new or higher fever.  
  · You have a new rash.  
 Watch closely for changes in your health, and be sure to contact your doctor if: 
  · You cough more deeply or more often, especially if you notice more mucus or a change in the color of your mucus.  
  · You are not getting better as expected. Where can you learn more? Go to http://pavan-erna.info/. Enter H333 in the search box to learn more about \"Bronchitis: Care Instructions. \" Current as of: December 6, 2017 Content Version: 11.8 © 6658-2690 [employer], [employer]. Care instructions adapted under license by [employer] (which disclaims liability or warranty for this information). If you have questions about a medical condition or this instruction, always ask your healthcare professional. [address] any warranty or liability for your use of this information.

## 2018-11-09 NOTE — PROGRESS NOTES
Renzo Mosquera is a 66 y.o. male presenting for Cough Lovetta Blaze 1. Have you been to the ER, urgent care clinic since your last visit? Hospitalized since your last visit? No 
 
2. Have you seen or consulted any other health care providers outside of the [address] [place] [address] since your last visit? Include any pap smears or colon screening. No 
 
Fall Risk Assessment, last 12 mths 1/25/2018 Able to walk? Yes Fall in past 12 months? No  
 
 
 
Abuse Screening Questionnaire 1/25/2018 Do you ever feel afraid of your partner? Fareed Silveira Are you in a relationship with someone who physically or mentally threatens you? Fareed Silveira Is it safe for you to go home? Y  
 
 
PHQ over the last two weeks 1/25/2018 Little interest or pleasure in doing things Not at all Feeling down, depressed, irritable, or hopeless Not at all Total Score PHQ 2 0 There are no discontinued medications.

## 2018-11-09 NOTE — PROGRESS NOTES
This note will not be viewable in [address] Juan Manuel Bassett is a 66 y.o. male and presents with Cough Mordecai Moab Subjective: 
Mr. Tk Gallego is seen in the office today with 12-14 days worth of an upper respiratory infection with the development of a deeply congested cough. The cough has been intermittently productive of purulent phlegm. He denies any fevers, chills, wheezing, shortness of breath or pleuritic pain. He has had no rhinorrhea or sore throat he denies neck stiffness or rash. He is tried Mucinex over-the-counter without relief Past Medical History:  
Diagnosis Date  Anemia   
 r/t chemo  Arthritis  BPH without obstruction/lower urinary tract symptoms 1/16/2018  Cancer ([address] 2008  
 esophageal (chemo and radiation completed 2011)  Diverticulosis  Drug-induced polyneuropathy ([address] 1/16/2018 Due to chemotherapy  GERD with stricture 1/16/2018 Status post dilatation  Hx of colonic polyps  Hypertension  Kidney cyst   
 CHAVEZ (nonalcoholic steatohepatitis) 2000 \"fatty liver\" diet related  Personal history of colonic polyps 12/6/2012  Thrombocytopenia ([facility] [address]   
 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  
Procedure Laterality Date  CHEST SURGERY PROCEDURE UNLISTED    
 esphogus removed; stomach in chest cavity  CHEST SURGERY PROCEDURE UNLISTED    
 left chcest prtacath  HX CHOLECYSTECTOMY  9/29/11  
 HX GI    
 hemorrhoidectomy  HX HEENT    
 cataract  HX HERNIA REPAIR    
 HX OTHER SURGICAL    
 esophagus tumor removed  HX VASCULAR ACCESS Right   
 removed 2014  [place] COL[place] FLX W/RMVL OF TUMOR POLYP LESION SNARE TQ  7/1/2010  [place] EGD BALLOON DILATION ESOPHAGUS <30 MM DIAM  3/3/2011  [place] EGD BALLOON DILATION ESOPHAGUS <30 MM DIAM  5/22/2012  [place] E[place] TRANSORAL BIOPSY SINGLE/MULTIPLE  3/29/2012  UPPER GI ENDOSCOPY,RISA VILLANUEVA,30MM  1/11/2018 No Known Allergies Current Outpatient Medications Medication Sig Dispense Refill  cefUROXime (CEFTIN) 500 mg tablet Take 1 Tab by mouth two (2) times a day. 20 Tab 0  
 benzonatate (TESSALON) 200 mg capsule Take 1 Cap by mouth three (3) times daily as needed for Cough for up to 7 days. 30 Cap 0  
 lisinopril (PRINIVIL, ZESTRIL) 20 mg tablet TAKE 1 TABLET DAILY 90 Tab 3  
 esomeprazole (NEXIUM) 40 mg capsule TAKE 1 CAPSULE DAILY 90 Cap 1  
 hydroCHLOROthiazide (HYDRODIURIL) 12.5 mg tablet TAKE 1 TABLET EVERY OTHER DAY 45 Tab 2  
 folic acid-vit F2-XJG W70 (FOLGARD RX) 2.2-25-1 mg tab Take  by mouth.  cholecalciferol, VITAMIN D3, (VITAMIN D3) 5,000 unit tab tablet Take 5,000 Units by mouth daily.  VITAMINS A AND D PO Take 1 Cap by mouth daily.  sildenafil citrate (VIAGRA) 50 mg tablet Take 50 mg by mouth as needed.  aspirin 81 mg tablet Take 81 mg by mouth daily.  CHOLESTYRAMINE/ASPARTAME (PREVALITE PO) Take 5.5 g by mouth every evening.  ranitidine (ZANTAC) 150 mg tablet Take 150 mg by mouth every morning.  CYANOCOBALAMIN (VITAMIN B-12 PO) Take 1,000 mg by mouth daily. 2 tablets  pyridoxine (VITAMIN B-6) 100 mg tablet Take 100 mg by mouth daily.  MULTIVITAMINS (MULTIVITAMIN PO) Take  by mouth daily. Social History Socioeconomic History  Marital status:  Spouse name: Not on file  Number of children: Not on file  Years of education: Not on file  Highest education level: Not on file Social Needs  Financial resource strain: Not on file  Food insecurity - worry: Not on file  Food insecurity - inability: Not on file  Transportation needs - medical: Not on file  Transportation needs - non-medical: Not on file Occupational History  Not on file Tobacco Use  Smoking status: Former Smoker Packs/day: 2.00 Years: 25.00 Pack years: 50.00 Last attempt to quit: 1987 Years since quittin.8  Smokeless tobacco: Never Used Substance and Sexual Activity  Alcohol use: No  
 Drug use: No  
 Sexual activity: Not on file Other Topics Concern  Not on file Social History Narrative  Not on file Family History Problem Relation Age of Onset  Diabetes Mother  Arthritis-osteo Father  Cancer Sister   
     lung  Cancer Sister   
     breast  
 
 
Review of Systems Constitutional: negative for fevers, chills, anorexia and weight loss Eyes:   negative for visual disturbance and irritation ENT:   Positive for some sinus congestion and post nasal drainage. Respiratory:  Positive for cough and chest congestion without wheezing CV:   negative for chest pain, palpitations, lower extremity edema GI:   negative for nausea, vomiting, diarrhea, abdominal pain,melena Integumentary: negative for rash and pruritus Neurological:  negative for headaches, dizziness, vertigo, memory problems and gait Objective:Visit Vitals /64 (BP 1 Location: Right arm) Temp 98.1 °F (36.7 °C) (Oral) Ht 5' 10\" (1.778 m) Wt 166 lb 9.6 oz (75.6 kg) BMI 23.90 kg/m² Body mass index is 23.9 kg/m². Physical Exam:  
General appearance - alert, ill appearing, and in no distress Mental status - alert, oriented to person, place, and time EYE-LUÍS, EOMI, conjuctiva clear. No lid swelling or purulent drainage ENT- TM's clear without A/F level. Pharynx slightly erythematous with drainage noted Nose - normal and patent, no erythema, Neck - supple, no significant adenopathy Chest - Coarse upper airway rhonchi present without wheezing Heart - normal rate, regular rhythm, normal S1, S2, no murmurs, rubs, clicks or gallops Skin-No rash appreciated Neuro -alert, oriented, normal speech, no focal findings. Assessment/Plan: 
Diagnoses and all orders for this visit: 
 
Acute bronchitis, unspecified organism -     cefUROXime (CEFTIN) 500 mg tablet; Take 1 Tab by mouth two (2) times a day., Normal, Disp-20 Tab, R-0 
-     benzonatate (TESSALON) 200 mg capsule; Take 1 Cap by mouth three (3) times daily as needed for Cough for up to 7 days. , Normal, Disp-30 Cap, R-0 Other Instructions: 
Tessalon as directed for cough suppression Increase po fluids Follow-up Disposition: 
Return if symptoms worsen or fail to improve. I have reviewed with the patient details of the assessment and plan and all questions were answered. Relevent patient education was performed. An After Visit Summary was printed and given to the patient.  
 
Liliam Alan MD

## 2018-12-04 ENCOUNTER — DOCUMENTATION ONLY (OUTPATIENT)
Dept: INTERNAL MEDICINE CLINIC | Age: 78
End: 2018-12-04

## 2019-01-18 NOTE — TELEPHONE ENCOUNTER
Pharmacy on file verified  Requested Prescriptions     Pending Prescriptions Disp Refills    esomeprazole (NEXIUM) 40 mg capsule 90 Cap 1     LOV 11/9/2018  NOV 2/5/2019

## 2019-01-19 RX ORDER — ESOMEPRAZOLE MAGNESIUM 40 MG/1
CAPSULE, DELAYED RELEASE ORAL
Qty: 90 CAP | Refills: 1 | Status: SHIPPED | OUTPATIENT
Start: 2019-01-19

## 2019-02-05 ENCOUNTER — OFFICE VISIT (OUTPATIENT)
Dept: INTERNAL MEDICINE CLINIC | Age: 79
End: 2019-02-05

## 2019-02-05 VITALS
DIASTOLIC BLOOD PRESSURE: 80 MMHG | BODY MASS INDEX: 26.68 KG/M2 | HEIGHT: 67 IN | OXYGEN SATURATION: 98 % | TEMPERATURE: 97.6 F | RESPIRATION RATE: 18 BRPM | SYSTOLIC BLOOD PRESSURE: 122 MMHG | HEART RATE: 67 BPM | WEIGHT: 170 LBS

## 2019-02-05 DIAGNOSIS — I10 ESSENTIAL HYPERTENSION: Chronic | ICD-10-CM

## 2019-02-05 DIAGNOSIS — N40.0 BPH WITHOUT OBSTRUCTION/LOWER URINARY TRACT SYMPTOMS: Chronic | ICD-10-CM

## 2019-02-05 DIAGNOSIS — Z23 ENCOUNTER FOR IMMUNIZATION: ICD-10-CM

## 2019-02-05 DIAGNOSIS — E11.9 TYPE 2 DIABETES MELLITUS WITHOUT COMPLICATION, WITHOUT LONG-TERM CURRENT USE OF INSULIN (HCC): Chronic | ICD-10-CM

## 2019-02-05 DIAGNOSIS — E55.9 VITAMIN D DEFICIENCY: Chronic | ICD-10-CM

## 2019-02-05 DIAGNOSIS — Z85.01 HISTORY OF ESOPHAGEAL CANCER: Chronic | ICD-10-CM

## 2019-02-05 DIAGNOSIS — Z00.00 MEDICARE ANNUAL WELLNESS VISIT, SUBSEQUENT: Primary | ICD-10-CM

## 2019-02-05 DIAGNOSIS — G62.0 DRUG-INDUCED POLYNEUROPATHY (HCC): Chronic | ICD-10-CM

## 2019-02-05 DIAGNOSIS — K22.2 GERD WITH STRICTURE: Chronic | ICD-10-CM

## 2019-02-05 DIAGNOSIS — K21.9 GERD WITH STRICTURE: Chronic | ICD-10-CM

## 2019-02-05 LAB
BACTERIA UA POCT, BACTPOCT: NORMAL
BILIRUB UR QL STRIP: NEGATIVE
CASTS UA POCT: NEGATIVE
CLUE CELLS, CLUEPOCT: NEGATIVE
CRYSTALS UA POCT, CRYSPOCT: NEGATIVE
EPITHELIAL CELLS POCT: NEGATIVE
GLUCOSE UR-MCNC: NEGATIVE MG/DL
GRAN# POC: 3.7 K/UL (ref 2–7.8)
GRAN% POC: 58.6 % (ref 37–92)
HCT VFR BLD CALC: 44.1 % (ref 37–51)
HGB BLD-MCNC: 14.7 G/DL (ref 12–18)
KETONES P FAST UR STRIP-MCNC: NEGATIVE MG/DL
LY# POC: 2.2 K/UL (ref 0.6–4.1)
LY% POC: 36.7 % (ref 10–58.5)
MCH RBC QN: 29.1 PG (ref 26–32)
MCHC RBC-ENTMCNC: 33.4 G/DL (ref 30–36)
MCV RBC: 87 FL (ref 80–97)
MICROALBUMIN UR TEST STR-MCNC: NEGATIVE MG/L (ref 0–20)
MID #, POC: 0.2 K/UL (ref 0–1.8)
MID% POC: 4.7 % (ref 0.1–24)
MUCUS UA POCT, MUCPOCT: NORMAL
PH UR STRIP: 6 [PH] (ref 5–7)
PLATELET # BLD: 177 K/UL (ref 140–440)
PROT UR QL STRIP: NEGATIVE
RBC # BLD: 5.07 M/UL (ref 4.2–6.3)
RBC UA POCT, RBCPOCT: 0
SP GR UR STRIP: 1.02 (ref 1.01–1.02)
TRICH UA POCT, TRICHPOC: NEGATIVE
UA UROBILINOGEN AMB POC: NORMAL (ref 0.2–1)
URINALYSIS CLARITY POC: CLEAR
URINALYSIS COLOR POC: NORMAL
URINE BLOOD POC: NEGATIVE
URINE CULT COMMENT, POCT: NORMAL
URINE LEUKOCYTES POC: NEGATIVE
URINE NITRITES POC: NEGATIVE
WBC # BLD: 6.1 K/UL (ref 4.1–10.9)
WBC UA POCT, WBCPOCT: 0
YEAST UA POCT, YEASTPOC: NEGATIVE

## 2019-02-05 NOTE — PROGRESS NOTES
Chief Complaint Patient presents with [address] Annual Wellness Visit Depression Risk Factor Screening: PHQ over the last two weeks 2/5/2019 Little interest or pleasure in doing things Not at all Feeling down, depressed, irritable, or hopeless Not at all Total Score PHQ 2 0 Functional Ability and Level of Safety: Activities of Daily Living ADL Assessment 2/5/2019 Feeding yourself No Help Needed Getting from bed to chair No Help Needed Getting dressed No Help Needed Bathing or showering No Help Needed Walk across the room (includes cane/walker) No Help Needed Using the telphone No Help Needed Taking your medications No Help Needed Preparing meals No Help Needed Managing money (expenses/bills) No Help Needed Moderately strenuous housework (laundry) No Help Needed Shopping for personal items (toiletries/medicines) No Help Needed Shopping for groceries No Help Needed Driving No Help Needed Climbing a flight of stairs No Help Needed Getting to places beyond walking distances No Help Needed Fall Risk Fall Risk Assessment, last 12 mths 2/5/2019 Able to walk? Yes Fall in past 12 months? No  
 
 
Abuse Screen Abuse Screening Questionnaire 2/5/2019 Do you ever feel afraid of your partner? Dierks Fidel Are you in a relationship with someone who physically or mentally threatens you? Shubham Fidel Is it safe for you to go home? Dante Arroyo Patient Care Team  
Patient Care Team: 
Chucho Lei MD as PCP - General (Internal Medicine) Rosey Fish MD (Gastroenterology)

## 2019-02-05 NOTE — PROGRESS NOTES
This note will not be viewable in [address] Mercedes Habermann is a 78 y.o. male and presents with Annual Wellness Visit and Follow Up Chronic Condition. The patient has not had a Medicare wellness exam done within the last year Annamary Ripa Subjective: 
Mr. Yao Teran is a 66-year-old  male who presents to the office today for a subsequent Medicare wellness exam plus follow-up of multiple medical conditions. The patient has a history of type 2 diabetes mellitus which is currently being managed with diet. He checks his blood sugars once daily with average fasting blood sugars of 100. He has had a diabetic retinal eye exam done within the last year. He denies polyuria polydipsia or blurred vision. He has had no unexplained weight loss. Hydrochlorothiazide and lisinopril are being used to control his hypertension. He tolerates this without cough, swelling, rash, dizziness or muscle cramping. He has had no headaches, numbness, tingling or focal neurological problems. He has a history of esophageal cancer for which he has had radiation and chemotherapy. He is followed by his cancer specialist on a yearly basis. Complicating this has been the development of a polyneuropathy when he received chemotherapy. He notes that his feet feel cold all the time. He does take multiple vitamins for this. The patient is also had GERD with recurrent strictures. He is on Nexium and ranitidine and this seems to control the symptoms. He has had no dysphasia, early satiety or unexplained weight loss. He has BPH for which she is followed by Dr. Ashton Gravely at the urology center and does have yearly PSAs done. He also has vitamin D deficiency and does supplement this. He is due for follow-up testing today. Past Medical History:  
Diagnosis Date  Anemia   
 r/t chemo  Arthritis  BPH without obstruction/lower urinary tract symptoms 1/16/2018  Cancer [facility]) 2008 esophageal (chemo and radiation completed 2011)  Diverticulosis  Drug-induced polyneuropathy ([address] 1/16/2018 Due to chemotherapy  GERD with stricture 1/16/2018 Status post dilatation  Hx of colonic polyps  Hypertension  Kidney cyst   
 CHAVEZ (nonalcoholic steatohepatitis) 2000 \"fatty liver\" diet related  Personal history of colonic polyps 12/6/2012  Thrombocytopenia ([facility] [address]   
 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  
Procedure Laterality Date  CHEST SURGERY PROCEDURE UNLISTED    
 esphogus removed; stomach in chest cavity  CHEST SURGERY PROCEDURE UNLISTED    
 left chcest prtacath  COLONOSCOPY N/A 6/23/2016 COL[facility] performed [facility] Onofre Boss [facility] at [facility] ENDOSCOPY  
 HX CHOLECYSTECTOMY  9/29/11  
 HX GI    
 hemorrhoidectomy  HX HEENT    
 cataract  HX HERNIA REPAIR    
 HX OTHER SURGICAL    
 esophagus tumor removed  HX VASCULAR ACCESS Right   
 removed 2014  [place] COL[place] FLX W/RMVL OF TUMOR POLYP LESION SNARE TQ  7/1/2010  [place] EGD BALLOON DILATION ESOPHAGUS <30 MM DIAM  3/3/2011  [place] EGD BALLOON DILATION ESOPHAGUS <30 MM DIAM  5/22/2012  [place] E[place] TRANSORAL BIOPSY SINGLE/MULTIPLE  3/29/2012  UPPER GI ENDOSCOPY,BALL DIL,30MM  1/11/2018 No Known Allergies Current Outpatient Medications Medication Sig Dispense Refill  esomeprazole (NEXIUM) 40 mg capsule TAKE 1 CAPSULE DAILY 90 Cap 1  
 lisinopril (PRINIVIL, ZESTRIL) 20 mg tablet TAKE 1 TABLET DAILY 90 Tab 3  
 hydroCHLOROthiazide (HYDRODIURIL) 12.5 mg tablet TAKE 1 TABLET EVERY OTHER DAY 45 Tab 2  
 folic acid-vit [id number] J18 (FOLGARD RX) 2.2-25-1 mg tab Take  by mouth.  cholecalciferol, VITAMIN D3, (VITAMIN D3) 5,000 unit tab tablet Take 5,000 Units by mouth daily.  VITAMINS A AND D PO Take 1 Cap by mouth daily.  sildenafil citrate (VIAGRA) 50 mg tablet Take 50 mg by mouth as needed.  aspirin 81 mg tablet Take 81 mg by mouth daily.  CHOLESTYRAMINE/ASPARTAME (PREVALITE PO) Take 5.5 g by mouth every evening.  ranitidine (ZANTAC) 150 mg tablet Take 150 mg by mouth every morning.  CYANOCOBALAMIN (VITAMIN B-12 PO) Take 1,000 mg by mouth daily. 2 tablets  pyridoxine (VITAMIN B-6) 100 mg tablet Take 100 mg by mouth daily.  MULTIVITAMINS (MULTIVITAMIN PO) Take  by mouth daily. Social History Socioeconomic History  Marital status:  Spouse name: Not on file  Number of children: Not on file  Years of education: Not on file  Highest education level: Not on file Tobacco Use  Smoking status: Former Smoker Packs/day: 2.00 Years: 25.00 Pack years: 50.00 Last attempt to quit: 1987 Years since quittin.1  Smokeless tobacco: Never Used Substance and Sexual Activity  Alcohol use: No  
 Drug use: No  
 
Family History Problem Relation Age of Onset  Diabetes Mother  Arthritis-osteo Father  Cancer Sister   
     lung  Cancer Sister   
     breast  
 
 
Health Maintenance Topic Date Due  
 DTaP/Tdap/Td series (1 - Tdap) 1961  Shingrix Vaccine Age 50> (1 of 2) 1990  MEDICARE YEARLY EXAM  2019  
 HEMOGLOBIN A1C Q6M  2019  
 FOOT EXAM Q1  2019  MICROALBUMIN Q1  2019  LIPID PANEL Q1  2019  GLAUCOMA SCREENING Q2Y  2020  
 EYE EXAM RETINAL OR DILATED  2020  Pneumococcal 65+ Low/Medium Risk  Completed  Influenza Age 5 to Adult  Completed Review of Systems Constitutional: negative for fevers, chills, anorexia and weight loss Eyes:   negative for visual disturbance and irritation ENT:   negative for tinnitus,sore throat,nasal congestion,ear pain,hoarseness Respiratory:  negative for cough, hemoptysis, dyspnea,wheezing CV:   negative for chest pain, palpitations, lower extremity edema GI:   negative for nausea, vomiting, diarrhea, abdominal pain,melena Endo:               negative for polyuria,polydipsia,polyphagia,heat intolerance Genitourinary: negative for frequency, dysuria and hematuria Integumentary: negative for rash and pruritus Hematologic:  negative for easy bruising and gum/nose bleeding Musculoskel: negative for myalgias, arthralgias, back pain, muscle weakness, joint pain Neurological:  negative for headaches, dizziness, vertigo, memory problems and gait. Complains of neuropathic symptoms of his feet \"feeling cold\" Behavl/Psych: negative for feelings of anxiety, depression, mood changes ROS otherwise negative Objective: 
Visit Vitals /80 (BP 1 Location: Left arm, BP Patient Position: Sitting) Pulse 67 Temp 97.6 °F (36.4 °C) (Oral) Resp 18 Ht 5' 7\" (1.702 m) Wt 170 lb (77.1 kg) SpO2 98% BMI 26.63 kg/m² Body mass index is 26.63 kg/m². Physical Exam:  
General appearance - alert, well appearing, and in no distress Mental status - alert, oriented to person, place, and time EYE-LUÍS, EOMI,conjunctiva normal bilaterally, lids normal 
ENT-ENT exam normal, no neck nodes or sinus tenderness Nose - normal and patent, no erythema,  Or discharge Mouth - mucous membranes moist, pharynx normal without lesions Neck - supple, no significant adenopathy or bruit Chest - clear to auscultation, no wheezes, rales or rhonchi. Heart - normal rate, regular rhythm, normal S1, S2, no murmurs, rubs, clicks or gallops Abdomen - soft, nontender, nondistended, no masses or organomegaly Lymph- no adenopathy palpable Ext-peripheral pulses normal, no pedal edema, no clubbing or cyanosis Skin-Warm and dry. no hyperpigmentation, vitiligo, or suspicious lesions Neuro -alert, oriented, normal speech, no focal findings or movement disorder noted In addition this patient is seen for AWV  as detailed below: This is a Subsequent Medicare Annual Wellness Exam (AWV) (Performed 12 months after IPPE or effective date of Medicare Part B enrollment) I have reviewed the patient's medical history in detail and updated the computerized patient record. Problem list reviewed with patient and risk factors discussed. PSH, SH, FH, Medications and HM issues also reviewed and discussed. Depression screen, fall risk assessment, functional abilities and ACP also reviewed and discussed as above and below. Depression Risk Factor Screening: PHQ over the last two weeks 2/5/2019 Little interest or pleasure in doing things Not at all Feeling down, depressed, irritable, or hopeless Not at all Total Score PHQ 2 0 Alcohol Risk Factor Screening: You do not drink alcohol or very rarely. Functional Ability and Level of Safety:  
Hearing Loss Hearing is good. Activities of Daily Living The home contains: grab bars Patient does total self care Fall Risk Fall Risk Assessment, last 12 mths 2/5/2019 Able to walk? Yes Fall in past 12 months? No  
 
 
Abuse Screen Patient is not abused Cognitive Screening Evaluation of Cognitive Function: 
Has your family/caregiver stated any concerns about your memory: no 
Normal 
 
Patient Care Team  
Patient Care Team: 
Guy Gramajo MD as PCP - General (Internal Medicine) Nimco Cabrera MD (Gastroenterology) Assessment/Plan Education and counseling provided: 
Are appropriate based on today's review and evaluation Assessment/Plan:  
Impressions: ICD-10-CM ICD-9-CM 1. Medicare annual wellness visit, subsequent Z00.00 V70.0 2.  Essential hypertension I10 401.9 AMB POC COMPLETE CBC,AUTOMATED ENTER  
   COLLECTION VENOUS BLOOD,VENIPUNCTURE  
   AMB POC URINALYSIS DIP STICK AUTO W/ MICRO   
   LIPID PANEL  
   METABOLIC PANEL, COMPREHENSIVE  
   TSH 3RD GENERATION  
 3. Type 2 diabetes mellitus without complication, without long-term current use of insulin (HCC) E11.9 250.00 HEMOGLOBIN A1C W/O EAG  
   AMB POC URINE, MICROALBUMIN, SEMIQUANT (1 RESULT) 4. GERD with stricture K21.9 530.81   
 K22.2 530.3 5. History of esophageal cancer Z85.01 V10.03   
6. Vitamin D deficiency E55.9 268.9 VITAMIN D, 25 HYDROXY 7. Drug-induced polyneuropathy (HCC) G62.0 357.6 E980.5 8. BPH without obstruction/lower urinary tract symptoms N40.0 600.00   
9. Encounter for immunization Z23 V03.89 TETANUS, DIPHTHERIA TOXOIDS AND ACELLULAR PERTUSSIS VACCINE (TDAP), IN INDIVIDS. >=7, IM  
   [place] IMMUNIZ ADMIN,1 SINGLE/COMB VAC/TOXOID Plan: 1. Continue present meds 2. Lifestyle modifications including Na restriction, low carb/fat diet, weight reduction and exercise (at least a walking program). Follow-up Disposition: 
Return in about 6 months (around 8/5/2019). Orders Placed This Encounter  Tetanus, diphtheria toxoids and acellular pertussis (TDAP) vaccine, in individuals >=7 years, IM  
 HEMOGLOBIN A1C W/O EAG  
 LIPID PANEL  
 METABOLIC PANEL, COMPREHENSIVE  
 TSH 3RD GENERATION  
 VITAMIN D, 25 HYDROXY  AMB POC COMPLETE CBC,AUTOMATED ENTER  AMB POC URINALYSIS DIP STICK AUTO W/ MICRO  AMB POC URINE, MICROALBUMIN, SEMIQUANT (1 RESULT)  COLLECTION VENOUS BLOOD,VENIPUNCTURE  
 VA IMMUNIZ ADMIN,1 SINGLE/COMB VAC/TOXOID Antoinette Moreno MD  
Assessment/Plan: 
Diagnoses and all orders for this visit: 
 
Medicare annual wellness visit, subsequent Essential hypertension -     AMB POC COMPLETE CBC,AUTOMATED ENTER 
-     COLLECTION VENOUS BLOOD,VENIPUNCTURE 
-     AMB POC URINALYSIS DIP STICK AUTO W/ MICRO  
-     LIPID PANEL 
-     METABOLIC PANEL, COMPREHENSIVE 
-     TSH 3RD GENERATION Type 2 diabetes mellitus without complication, without long-term current use of insulin (HCC) 
-     HEMOGLOBIN A1C W/O EAG 
 -     AMB POC URINE, MICROALBUMIN, SEMIQUANT (1 RESULT) GERD with stricture History of esophageal cancer Vitamin D deficiency 
-     VITAMIN D, 25 HYDROXY Drug-induced polyneuropathy ([address] BPH without obstruction/lower urinary tract symptoms Encounter for immunization -     TETANUS, DIPHTHERIA TOXOIDS AND ACELLULAR PERTUSSIS VACCINE (TDAP), IN INDIVIDS. >=7, IM 
-     [place] IMMUNIZ ADMIN,1 SINGLE/COMB VAC/TOXOID Health Maintenance Due Topic Date Due  
 DTaP/Tdap/Td series (1 - Tdap) 01/02/1961  Shingrix Vaccine Age 50> (1 of 2) 01/02/1990  MEDICARE YEARLY EXAM  01/26/2019  
 HEMOGLOBIN A1C Q6M  02/01/2019 Other instructions: The patient's medications are reviewed and reconciled. No change in his current medical regimen is made. Body mass index is 26.63 and dietary counseling along with printed patient education is given Continue to check blood sugars once daily Health maintenance issues were reviewed and he is due for Tdap vaccination which will be given today. I have also asked him to sign up to get on a waiting list for the shingles vaccine at his pharmacy. Await results of multiple labs. Follow-up 6 months Follow-up Disposition: 
Return in about 6 months (around 8/5/2019). I have reviewed with the patient details of the assessment and plan and all questions were answered. Relevent patient education was performed. The most recent lab findings were reviewed with the patient. An After Visit Summary was printed and given to the patient.  
 
Fifi Michel MD

## 2019-02-06 LAB
25(OH)D3+25(OH)D2 SERPL-MCNC: 41.7 NG/ML (ref 30–100)
ALBUMIN SERPL-MCNC: 4.8 G/DL (ref 3.5–4.8)
ALBUMIN/GLOB SERPL: 1.7 {RATIO} (ref 1.2–2.2)
ALP SERPL-CCNC: 107 IU/L (ref 39–117)
ALT SERPL-CCNC: 16 IU/L (ref 0–44)
AST SERPL-CCNC: 24 IU/L (ref 0–40)
BILIRUB SERPL-MCNC: 0.5 MG/DL (ref 0–1.2)
BUN SERPL-MCNC: 20 MG/DL (ref 8–27)
BUN/CREAT SERPL: 15 (ref 10–24)
CALCIUM SERPL-MCNC: 10.3 MG/DL (ref 8.6–10.2)
CHLORIDE SERPL-SCNC: 100 MMOL/L (ref 96–106)
CHOLEST SERPL-MCNC: 146 MG/DL (ref 100–199)
CO2 SERPL-SCNC: 23 MMOL/L (ref 20–29)
CREAT SERPL-MCNC: 1.36 MG/DL (ref 0.76–1.27)
GLOBULIN SER CALC-MCNC: 2.8 G/DL (ref 1.5–4.5)
GLUCOSE SERPL-MCNC: 98 MG/DL (ref 65–99)
HBA1C MFR BLD: 6 % (ref 4.8–5.6)
HDLC SERPL-MCNC: 52 MG/DL
LDLC SERPL CALC-MCNC: 76 MG/DL (ref 0–99)
POTASSIUM SERPL-SCNC: 5.3 MMOL/L (ref 3.5–5.2)
PROT SERPL-MCNC: 7.6 G/DL (ref 6–8.5)
SODIUM SERPL-SCNC: 139 MMOL/L (ref 134–144)
TRIGL SERPL-MCNC: 90 MG/DL (ref 0–149)
TSH SERPL DL<=0.005 MIU/L-ACNC: 0.96 UIU/ML (ref 0.45–4.5)
VLDLC SERPL CALC-MCNC: 18 MG/DL (ref 5–40)

## 2019-02-11 NOTE — PATIENT INSTRUCTIONS
SVE c/c/+2 The best way to stay healthy is to live a healthy lifestyle. A healthy lifestyle includes regular exercise, eating a well-balanced diet, keeping a healthy weight and not smoking. Regular physical exams and screening tests are another important way to take care of yourself. Preventive exams provided by health care providers can find health problems early when treatment works best and can keep you from getting certain diseases or illnesses. Preventive services include exams, lab tests, screenings, shots, monitoring and information to help you take care of your own health. All people over 65 should have a pneumonia shot. Pneumonia shots are usually only needed once in a lifetime unless your doctor decides differently. In addition to your physical exam, some screening tests are recommended: 
 
All people over 65 should have a yearly flu shot. People over 65 are at medium to high risk for Hepatitis B. Three shots are needed for complete protection. Bone mass measurement (dexa scan) is recommended every two years. Diabetes Mellitus screening is recommended every year. Glaucoma is an eye disease caused by high pressure in the eye. An eye exam is recommended every year. Cardiovascular screening tests that check your cholesterol and other blood fat (lipid) levels are recommended every five years. Colorectal Cancer screening tests help to find pre-cancerous polyps (growths in the colon) so they can be removed before they turn into cancer. Tests ordered for screening depend on your personal and family history risk factors. Prostate Cancer Screening (annually up to age 76) Screening for breast cancer is recommended yearly with a Mammogram. 
 
Screening for cervical and vaginal cancer is recommended with a pelvic and Pap test every two years.  However if you have had an abnormal pap in the past  three years or at high risk for cervical or vaginal cancer Medicare will cover a pap test and a pelvic exam every year. Here is a list of your current Health Maintenance items with a due date: 
Health Maintenance Due Topic Date Due  
 DTaP/Tdap/Td  (1 - Tdap) 1961  Shingles Vaccine (1 of 2) 1990 [facility] Annual Well Visit  2019  Hemoglobin A1C    2019 Vaccine Information Statement Tdap (Tetanus, Diphtheria, Pertussis) Vaccine: What You Need to Know Many Vaccine Information Statements are available in Wolof and other languages. See www.immunize.org/vis. Hojas de Información Sobre Vacunas están disponibles en español y en muchos otros idiomas. Visite [place] [email or web address].si 1. Why get vaccinated? Tetanus, diphtheria, and pertussis are very serious diseases. Tdap vaccine can protect us from these diseases. And, Tdap vaccine given to pregnant women can protect  babies against pertussis. TETANUS (Lockjaw) is rare in the [facility] today. It causes painful muscle tightening and stiffness, usually all over the body. ? It can lead to tightening of muscles in the head and neck so you cant open your mouth, swallow, or sometimes even breathe. Tetanus kills about 1 out of 10 people who are infected even after receiving the best medical care. DIPHTHERIA is also rare in the [facility] today. It can cause a thick coating to form in the back of the throat. ? It can lead to breathing problems, heart failure, paralysis, and death. PERTUSSIS (Whooping Cough) causes severe coughing spells, which can cause difficulty breathing, vomiting, and disturbed sleep. ? It can also lead to weight loss, incontinence, and rib fractures. Up to 2 in 100 adolescents and 5 in 100 adults with pertussis are hospitalized or have complications, which could include pneumonia or death. These diseases are caused by bacteria. Diphtheria and pertussis are spread from person to person through secretions from coughing or sneezing. Tetanus enters the body through cuts, scratches, or wounds. Before vaccines, as many as 200,000 cases of diphtheria, 200,000 cases of pertussis, and hundreds of cases of tetanus, were reported in the [place] each year. Since vaccination began, reports of cases for tetanus and diphtheria have dropped by about 99% and for pertussis by about 80%. 2. Tdap vaccine Tdap vaccine can protect adolescents and adults from tetanus, diphtheria, and pertussis. One dose of Tdap is routinely given at age 6 or 15. People who did not get Tdap at that age should get it as soon as possible. Tdap is especially important for health care professionals and anyone having close contact with a baby younger than 12 months. Pregnant women should get a dose of Tdap during every pregnancy, to protect the  from pertussis. Infants are most at risk for severe, life-threatening complications from pertussis. Another vaccine, called Td, protects against tetanus and diphtheria, but not pertussis. A Td booster should be given every 10 years. Tdap may be given as one of these boosters if you have never gotten Tdap before. Tdap may also be given after a severe cut or burn to prevent tetanus infection. Your doctor or the person giving you the vaccine can give you more information. Tdap may safely be given at the same time as other vaccines. 3. Some people should not get this vaccine  A person who has ever had a life-threatening allergic reaction after a previous dose of any diphtheria, tetanus or pertussis containing vaccine, OR has a severe allergy to any part of this vaccine, should not get Tdap vaccine. Tell the person giving the vaccine about any severe allergies.  Anyone who had coma or long repeated seizures within 7 days after a childhood dose of DTP or DTaP, or a previous dose of Tdap, should not get Tdap, unless a cause other than the vaccine was found. They can still get Td.  Talk to your doctor if you: 
- have seizures or another nervous system problem, 
- had severe pain or swelling after any vaccine containing diphtheria, tetanus or pertussis,  
- ever had a condition called Guillain Barré Syndrome (GBS), 
- arent feeling well on the day the shot is scheduled. 4. Risks With any medicine, including vaccines, there is a chance of side effects. These are usually mild and go away on their own. Serious reactions are also possible but are rare. Most people who get Tdap vaccine do not have any problems with it. Mild Problems following Tdap 
(Did not interfere with activities)  Pain where the shot was given (about 3 in 4 adolescents or 2 in 3 adults)  Redness or swelling where the shot was given (about 1 person in 5)  Mild fever of at least 100.4°F (up to about 1 in 25 adolescents or 1 in 100 adults)  Headache (about 3 or 4 people in 10)  Tiredness (about 1 person in 3 or 4)  Nausea, vomiting, diarrhea, stomach ache (up to 1 in 4 adolescents or 1 in 10 adults)  Chills,  sore joints (about 1 person in 10)  Body aches (about 1 person in 3 or 4)  Rash, swollen glands (uncommon) Moderate Problems following Tdap (Interfered with activities, but did not require medical attention)  Pain where the shot was given (up to 1 in 5 or 6)  Redness or swelling where the shot was given (up to about 1 in 16 adolescents or 1 in 12 adults)  Fever over 102°F (about 1 in 100 adolescents or 1 in 250 adults)  Headache (about 1 in 7 adolescents or 1 in 10 adults)  Nausea, vomiting, diarrhea, stomach ache (up to 1 or 3 people in 100)  Swelling of the entire arm where the shot was given (up to about 1 in 500). Severe Problems following Tdap 
(Unable to perform usual activities; required medical attention)  Swelling, severe pain, bleeding, and redness in the arm where the shot was given (rare). Problems that could happen after any vaccine:  People sometimes faint after a medical procedure, including vaccination. Sitting or lying down for about 15 minutes can help prevent fainting, and injuries caused by a fall. Tell your doctor if you feel dizzy, or have vision changes or ringing in the ears.  Some people get severe pain in the shoulder and have difficulty moving the arm where a shot was given. This happens very rarely.  Any medication can cause a severe allergic reaction. Such reactions from a vaccine are very rare, estimated at fewer than 1 in a million doses, and would happen within a few minutes to a few hours after the vaccination. As with any medicine, there is a very remote chance of a vaccine causing a serious injury or death. The safety of vaccines is always being monitored. For more information, visit: www.cdc.gov/vaccinesafety/ 
 
5. What if there is a serious problem? What should I look for?  Look for anything that concerns you, such as signs of a severe allergic reaction, very high fever, or unusual behavior.  Signs of a severe allergic reaction can include hives, swelling of the face and throat, difficulty breathing, a fast heartbeat, dizziness, and weakness. These would usually start a few minutes to a few hours after the vaccination. What should I do?  If you think it is a severe allergic reaction or other emergency that cant wait, call 9-1-1 or get the person to the nearest hospital. Otherwise, call your doctor.  Afterward, the reaction should be reported to the Vaccine Adverse Event Reporting System (VAERS). Your doctor might file this report, or you can do it yourself through the VAERS web site at www.vaers. hhs.gov, or by calling 0-981.712.9809. VAERS does not give medical advice.  
 
6. The National Vaccine Injury Compensation Program 
 
The National Vaccine Injury Compensation Program (VICP) is a federal program that was created to compensate people who may have been injured by certain vaccines. Persons who believe they may have been injured by a vaccine can learn about the program and about filing a claim by calling 0-126.276.3989 or visiting the [employer] website at www.[facility].gov/vaccinecompensation. There is a time limit to file a claim for compensation. 7. How can I learn more?  Ask your doctor. He or she can give you the vaccine package insert or suggest other sources of information.  Call your local or state health department.  Contact the Centers for Disease Control and Prevention (CDC): 
- Call 7-175.689.3055 (1-800-CDC-INFO) or 
- Visit CDCs website at www.cdc.gov/vaccines Vaccine Information Statement Tdap Vaccine 
(2/24/2015) [address] [employer] [employer] 490PD-51 Department of Health and [employer] Centers for Disease Control and Prevention Office Use Only Learning About Cutting Calories How do calories affect your weight? Food gives your body energy. Energy from the food you eat is measured in calories. This energy keeps your heart beating, your brain active, and your muscles working. Your body needs a certain number of calories each day. After your body uses the calories it needs, it stores extra calories as fat. To lose weight safely, you have to eat fewer calories while eating in a healthy way. How many calories do you need each day? The more active you are, the more calories you need. When you are less active, you need fewer calories. How many calories you need each day also depends on several things, including your age and whether you are male or female. Here are some general guidelines for adults: 
· Less active women and older adults need 1,600 to 2,000 calories each day. · Active women and less active men need 2,000 to 2,400 calories each day. · Active men need 2,400 to 3,000 calories each day. How can you cut calories and eat healthy meals?  
Whole grains, vegetables and fruits, and dried beans are good lower-calorie foods. They give you lots of nutrients and fiber. And they fill you up. Sweets, energy drinks, and soda pop are high in calories. They give you few nutrients and no fiber. Try to limit soda pop, fruit juice, and energy drinks. Drink water instead. Some fats can be part of a healthy diet. But cutting back on fats from highly processed foods like fast foods and many snack foods is a good way to lower the calories in your diet. Also, use smaller amounts of fats like butter, margarine, salad dressing, and mayonnaise. Add fresh garlic, lemon, or herbs to your meals to add flavor without adding fat. Meats and dairy products can be a big source of hidden fats. Try to choose lean or low-fat versions of these products. Fat-free cookies, candies, chips, and frozen treats can still be high in sugar and calories. Some fat-free foods have more calories than regular ones. Eat fat-free treats in moderation, as you would other foods. If your favorite foods are high in fat, salt, sugar, or calories, limit how often you eat them. Eat smaller servings, or look for healthy substitutes. Fill up on fruits, vegetables, and whole grains. Eating at home · Use meat as a side dish instead of as the main part of your meal. 
· Try main dishes that use whole wheat pasta, brown rice, dried beans, or vegetables. · Find ways to cook with little or no fat, such as broiling, steaming, or grilling. · Use cooking spray instead of oil. If you use oil, use a monounsaturated oil, such as canola or olive oil. · Trim fat from meats before you cook them. · Drain off fat after you brown the meat or while you roast it. · Chill soups and stews after you cook them. Then skim the fat off the top after it hardens. Eating out · Order foods that are broiled or poached rather than fried or breaded. · Cut back on the amount of butter or margarine that you use on bread. · Order sauces, gravies, and salad dressings on the side, and use only a little. · When you order pasta, choose tomato sauce rather than cream sauce. · Ask for salsa with your baked potato instead of sour cream, butter, cheese, or pace. · Order meals in a small size instead of upgrading to a large. · Share an entree, or take part of your food home to eat as another meal. 
· Share appetizers and desserts. Where can you learn more? Go to http://pavan-erna.info/. Enter 99 [id number] in the search box to learn more about \"Learning About Cutting Calories. \" Current as of: March 28, 2018 Content Version: 11.9 © 3176-0216 "[employer]. Care instructions adapted under license by [employer] (which disclaims liability or warranty for this information). If you have questions about a medical condition or this instruction, always ask your healthcare professional. [place] [address] any warranty or liability for your use of this information.

## 2019-04-15 RX ORDER — HYDROCHLOROTHIAZIDE 12.5 MG/1
TABLET ORAL
Qty: 45 TAB | Refills: 2 | Status: SHIPPED | OUTPATIENT
Start: 2019-04-15 | End: 2020-01-10

## 2019-04-24 ENCOUNTER — TELEPHONE (OUTPATIENT)
Dept: INTERNAL MEDICINE CLINIC | Age: 79
End: 2019-04-24

## 2019-04-24 NOTE — TELEPHONE ENCOUNTER
Patients wife states she is going to have them re-fax the lab work and will call Monday morning to notify us if he needs to be seen or just needs additional lab testing.

## 2019-04-24 NOTE — TELEPHONE ENCOUNTER
Patients wife, Cristofertrang Mullen is calling, she states that they see a specialist Friday and were told to get blood work done today. The results came back and they were advised that his potasium was 'off' and to have Dr. Belinda Hassan overlook everything. I advised Cristofer Mullen that Dr. Belinda Hassan is out until Monday and that we would call back as soon as possible with what he wants us to do.     Best call back # for Cristofer Mullen: [phone]

## 2019-04-29 ENCOUNTER — OFFICE VISIT (OUTPATIENT)
Dept: INTERNAL MEDICINE CLINIC | Age: 79
End: 2019-04-29

## 2019-04-29 VITALS
SYSTOLIC BLOOD PRESSURE: 120 MMHG | WEIGHT: 170 LBS | OXYGEN SATURATION: 99 % | DIASTOLIC BLOOD PRESSURE: 70 MMHG | RESPIRATION RATE: 18 BRPM | HEART RATE: 61 BPM | TEMPERATURE: 97.3 F | BODY MASS INDEX: 26.68 KG/M2 | HEIGHT: 67 IN

## 2019-04-29 DIAGNOSIS — I10 ESSENTIAL HYPERTENSION: Primary | Chronic | ICD-10-CM

## 2019-04-29 DIAGNOSIS — N18.30 STAGE 3 CHRONIC KIDNEY DISEASE (HCC): ICD-10-CM

## 2019-04-29 DIAGNOSIS — E87.5 HYPERKALEMIA: ICD-10-CM

## 2019-04-29 LAB
ANION GAP SERPL CALC-SCNC: 17 MMOL/L
BUN SERPL-MCNC: 16 MG/DL (ref 9–20)
CALCIUM SERPL-MCNC: 9.9 MG/DL (ref 8.4–10.2)
CHLORIDE SERPL-SCNC: 105 MMOL/L (ref 98–107)
CO2 SERPL-SCNC: 22 MMOL/L (ref 22–32)
CREAT SERPL-MCNC: 1.1 MG/DL (ref 0.8–1.5)
GLUCOSE SERPL-MCNC: 92 MG/DL (ref 75–110)
POTASSIUM SERPL-SCNC: 5.2 MMOL/L (ref 3.6–5)
SODIUM SERPL-SCNC: 144 MMOL/L (ref 137–145)

## 2019-04-29 NOTE — PROGRESS NOTES
Nicky Myrick is a 78 y.o. male presenting for Abnormal Lab Results Sudie Mar 1. Have you been to the ER, urgent care clinic since your last visit? Hospitalized since your last visit? No 
 
2. Have you seen or consulted any other health care providers outside of the [address] [place] [address] since your last visit? Include any pap smears or colon screening. Yes When: 4-24-19 Where: [facility] Reason for visit: labs. Fall Risk Assessment, last 12 mths 2/5/2019 Able to walk? Yes Fall in past 12 months? No  
 
 
 
Abuse Screening Questionnaire 2/5/2019 Do you ever feel afraid of your partner? Kamilah Maher Are you in a relationship with someone who physically or mentally threatens you? Kamilah Maher Is it safe for you to go home? Y  
 
 
3 most recent PHQ Screens 2/5/2019 Little interest or pleasure in doing things Not at all Feeling down, depressed, irritable, or hopeless Not at all Total Score PHQ 2 0 There are no discontinued medications.

## 2019-04-29 NOTE — PATIENT INSTRUCTIONS
Hyperkalemia: Care Instructions Your Care Instructions Hyperkalemia is too much potassium in the blood. Potassium helps keep the right mix of fluids in your body. It also helps your nerves and muscles work as they should. And it keeps your heartbeat in a normal rhythm. Some things can raise potassium levels. These include some health problems, medicines, and kidney problems. (Normally, your kidneys remove extra potassium.) Too much potassium can cause nausea. It also can cause a heartbeat that isn't normal. But you may not have any symptoms. Too much potassium can be dangerous. That's why it's important to treat it. If you are taking any of the medicines that can raise your levels, your doctor will ask you to stop. You may get medicines to lower your levels. And you may have to limit or not eat foods that have a lot of potassium. Follow-up care is a key part of your treatment and safety. Be sure to make and go to all appointments, and call your doctor if you are having problems. It's also a good idea to know your test results and keep a list of the medicines you take. How can you care for yourself at home? · Take your medicines exactly as prescribed. Call your doctor if you think you are having a problem with your medicine. · Stop taking certain medicines if your doctor asks you to. They may be causing your high potassium levels. If you have concerns about stopping medicine, talk with your doctor. · If you have kidney, heart, or liver disease and have to limit fluids, talk with your doctor before you increase the amount of fluids you drink. If the doctor says it's okay, drink plenty of fluids. This means drinking enough so that your urine is light yellow or clear like water. · Avoid strenuous exercise until your doctor tells you it is okay. · Potassium is in many foods, including vegetables, fruits, and milk products.  Foods high in potassium include bananas, cantaloupe, broccoli, milk, potatoes, and tomatoes. · Low potassium foods include blueberries, raspberries, cucumber, white or brown rice, spaghetti, and macaroni. · Do not use a salt substitute without talking to your doctor first. Most of these are very high in potassium. · Be sure to tell your doctor about any prescription, over-the-counter, or herbal medicines you take. Some of these can raise potassium. When should you call for help? Call 911 anytime you think you may need emergency care. For example, call if: 
  · You passed out (lost consciousness).  
  · You have an unusual heartbeat. Your heart may beat fast or skip beats.  
 Call your doctor now or seek immediate medical care if: 
  · You have muscle aches.  
  · You feel very weak.  
 Watch closely for changes in your health, and be sure to contact your doctor if: 
  · You do not get better as expected. Where can you learn more? Go to http://pavan-erna.info/. Enter P418 in the search box to learn more about \"Hyperkalemia: Care Instructions. \" Current as of: March 14, 2018 Content Version: 11.9 © 2150-7480 [email or web address]. Care instructions adapted under license by [employer] (which disclaims liability or warranty for this information). If you have questions about a medical condition or this instruction, always ask your healthcare professional. [address] any warranty or liability for your use of this information.

## 2019-04-29 NOTE — PROGRESS NOTES
This note will not be viewable in [address] Subjective:  
 
Mr. Orvel Galeazzi presents to the office today in follow-up of a 4/23 office visit with his oncologist Dr. Elle Lyons.  Patient has a history of esophageal cancer and had laboratory studies done revealing chronic kidney disease stage III associated with elevated hyperkalemia at 5.6. Patient also has hypertension for which 1 of the medications that he takes for this is lisinopril. This was subsequently stopped and he returns today for follow-up. Patient denies excessive use of potassium supplements, light salt, etc.  He has had no dizziness, palpitations, syncope. He denies any muscle cramping and he has had no strokelike symptoms. Past Medical History:  
Diagnosis Date  Anemia   
 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  
 esophageal (chemo and radiation completed 2011)  Diverticulosis  Drug-induced polyneuropathy ([address] 1/16/2018 Due to chemotherapy  GERD with stricture 1/16/2018 Status post dilatation  Hx of colonic polyps  Hyperkalemia 4/29/2019  Hypertension  Kidney cyst   
 CHAVEZ (nonalcoholic steatohepatitis) 2000 \"fatty liver\" diet related  Personal history of colonic polyps 12/6/2012  Stage 3 chronic kidney disease ([address] 4/29/2019  Thrombocytopenia ([address]   
 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  
Procedure Laterality Date  CHEST SURGERY PROCEDURE UNLISTED    
 esphogus removed; stomach in chest cavity  CHEST SURGERY PROCEDURE UNLISTED    
 left chcest prtacath  COLONOSCOPY N/A 6/23/2016 COLONOSCOPY performed by Wilfrid Paige MD at [facility] ENDOSCOPY  
 HX CHOLECYSTECTOMY  9/29/11  
 HX GI    
 hemorrhoidectomy  HX HEENT    
 cataract  HX HERNIA REPAIR    
 HX OTHER SURGICAL    
 esophagus tumor removed  HX VASCULAR ACCESS Right   
 removed   NM COLSC FLX W/RMVL OF TUMOR POLYP LESION SNARE TQ  2010  NM EGD BALLOON DILATION ESOPHAGUS <30 MM DIAM  3/3/2011  NM EGD BALLOON DILATION ESOPHAGUS <30 MM DIAM  2012  NM EGD TRANSORAL BIOPSY SINGLE/MULTIPLE  3/29/2012  UPPER GI ENDOSCOPY,RISA VILLANUEVA,30MM  2018 No Known Allergies Current Outpatient Medications Medication Sig Dispense Refill  hydroCHLOROthiazide (HYDRODIURIL) 12.5 mg tablet TAKE 1 TABLET EVERY OTHER DAY 45 Tab 2  
 esomeprazole (NEXIUM) 40 mg capsule TAKE 1 CAPSULE DAILY 90 Cap 1  
 folic acid-vit K3-GPQ R66 (FOLGARD RX) 2.2-25-1 mg tab Take  by mouth.  cholecalciferol, VITAMIN D3, (VITAMIN D3) 5,000 unit tab tablet Take 5,000 Units by mouth daily.  VITAMINS A AND D PO Take 1 Cap by mouth daily.  sildenafil citrate (VIAGRA) 50 mg tablet Take 50 mg by mouth as needed.  aspirin 81 mg tablet Take 81 mg by mouth daily.  CHOLESTYRAMINE/ASPARTAME (PREVALITE PO) Take 5.5 g by mouth every evening.  ranitidine (ZANTAC) 150 mg tablet Take 150 mg by mouth every morning.  CYANOCOBALAMIN (VITAMIN B-12 PO) Take 1,000 mg by mouth daily. 2 tablets  pyridoxine (VITAMIN B-6) 100 mg tablet Take 100 mg by mouth daily.  MULTIVITAMINS (MULTIVITAMIN PO) Take  by mouth daily. Social History Socioeconomic History  Marital status:  Spouse name: Not on file  Number of children: Not on file  Years of education: Not on file  Highest education level: Not on file Tobacco Use  Smoking status: Former Smoker Packs/day: 2.00 Years: 25.00 Pack years: 50.00 Last attempt to quit: 1987 Years since quittin.3  Smokeless tobacco: Never Used Substance and Sexual Activity  Alcohol use: No  
 Drug use: No  
 
Family History Problem Relation Age of Onset  Diabetes Mother  Arthritis-osteo Father  Cancer Sister   
     lung  Cancer Sister   
     breast  
 
 
Review of Systems: 
GEN: no weight loss, weight gain, fatigue or night sweats CV: no PND, orthopnea, or palpitations Resp: no dyspnea on exertion, no cough Abd: no nausea, vomiting or diarrhea EXT: denies edema, claudication Endocrine: no hair loss, excessive thirst or polyuria Neurological ROS: no TIA or stroke symptoms ROS otherwise negative Objective:  
 
Visit Vitals /70 Pulse 61 Temp 97.3 °F (36.3 °C) (Oral) Resp 18 Ht 5' 7\" (1.702 m) Wt 170 lb (77.1 kg) SpO2 99% BMI 26.63 kg/m² Body mass index is 26.63 kg/m². General:   alert, cooperative and no distress Eyes: conjunctivae/sclerae clear. PERRL, EOM's intact Mouth:  No oral lesions, no pharyngeal erythema, no exudates Neck: Trachea midline, no thyromegaly, no bruits Heart: S1 and S2 normal,no murmurs noted Lungs: Clear to auscultation bilaterally, no increased work of breathing Abdomen: Soft, nontender. Normal bowel sounds Extremities: No edema or cyanosis Neuro: ..alert, oriented x3,speech normal in context and clarity, cranial nerves II-XII intact,motor strength: full proximally and distally,gait: normal 
reflexes: full and symmetric Physical exam otherwise negative Assessment/Plan:  
 
Diagnoses and all orders for this visit: 
 
Essential hypertension 
-     COLLECTION VENOUS BLOOD,VENIPUNCTURE 
-     METABOLIC PANEL, BASIC Hyperkalemia Stage 3 chronic kidney disease (HCC) 
-     COLLECTION VENOUS BLOOD,VENIPUNCTURE 
-     METABOLIC PANEL, BASIC Other instructions: The patient's medications were reviewed and reconciled. I have endorsed that he remain off the lisinopril. Blood pressure is still adequately controlled at present and no additional medication will be added. A follow-up BMP has been obtained and I have reviewed Dr. Kalani Arango recent labs and will have a copy scanned into the computer. I have asked him to return in 4 weeks time for Follow-up and Dispositions · Return in about 1 month (around 5/27/2019).  
  
 
 
Lencho Barnard MD

## 2019-05-23 ENCOUNTER — OFFICE VISIT (OUTPATIENT)
Dept: INTERNAL MEDICINE CLINIC | Age: 79
End: 2019-05-23

## 2019-05-23 VITALS
OXYGEN SATURATION: 98 % | HEIGHT: 67 IN | BODY MASS INDEX: 26.46 KG/M2 | SYSTOLIC BLOOD PRESSURE: 114 MMHG | HEART RATE: 58 BPM | DIASTOLIC BLOOD PRESSURE: 76 MMHG | TEMPERATURE: 97.4 F | WEIGHT: 168.6 LBS

## 2019-05-23 DIAGNOSIS — N18.30 STAGE 3 CHRONIC KIDNEY DISEASE (HCC): ICD-10-CM

## 2019-05-23 DIAGNOSIS — I10 ESSENTIAL HYPERTENSION: Primary | Chronic | ICD-10-CM

## 2019-05-23 DIAGNOSIS — E87.5 HYPERKALEMIA: ICD-10-CM

## 2019-05-23 LAB
ANION GAP SERPL CALC-SCNC: 11 MMOL/L
BUN SERPL-MCNC: 16 MG/DL (ref 9–20)
CALCIUM SERPL-MCNC: 9.4 MG/DL (ref 8.4–10.2)
CHLORIDE SERPL-SCNC: 104 MMOL/L (ref 98–107)
CO2 SERPL-SCNC: 24 MMOL/L (ref 22–32)
CREAT SERPL-MCNC: 1.1 MG/DL (ref 0.8–1.5)
GLUCOSE SERPL-MCNC: 86 MG/DL (ref 75–110)
POTASSIUM SERPL-SCNC: 5.2 MMOL/L (ref 3.6–5)
SODIUM SERPL-SCNC: 139 MMOL/L (ref 137–145)

## 2019-05-23 NOTE — PROGRESS NOTES
Francisco Hook is a 78 y.o. male presenting for Hypertension (1 mo fu)  . 1. Have you been to the ER, urgent care clinic since your last visit? Hospitalized since your last visit? No    2. Have you seen or consulted any other health care providers outside of the [address] [place] [address] since your last visit? Include any pap smears or colon screening. No    Fall Risk Assessment, last 12 mths 2/5/2019   Able to walk? Yes   Fall in past 12 months? No         Abuse Screening Questionnaire 2/5/2019   Do you ever feel afraid of your partner? N   Are you in a relationship with someone who physically or mentally threatens you? N   Is it safe for you to go home? Y       3 most recent PHQ Screens 2/5/2019   Little interest or pleasure in doing things Not at all   Feeling down, depressed, irritable, or hopeless Not at all   Total Score PHQ 2 0       There are no discontinued medications.

## 2019-05-23 NOTE — PATIENT INSTRUCTIONS
DASH Diet: Care Instructions Your Care Instructions The DASH diet is an eating plan that can help lower your blood pressure. DASH stands for Dietary Approaches to Stop Hypertension. Hypertension is high blood pressure. The DASH diet focuses on eating foods that are high in calcium, potassium, and magnesium. These nutrients can lower blood pressure. The foods that are highest in these nutrients are fruits, vegetables, low-fat dairy products, nuts, seeds, and legumes. But taking calcium, potassium, and magnesium supplements instead of eating foods that are high in those nutrients does not have the same effect. The DASH diet also includes whole grains, fish, and poultry. The DASH diet is one of several lifestyle changes your doctor may recommend to lower your high blood pressure. Your doctor may also want you to decrease the amount of sodium in your diet. Lowering sodium while following the DASH diet can lower blood pressure even further than just the DASH diet alone. Follow-up care is a key part of your treatment and safety. Be sure to make and go to all appointments, and call your doctor if you are having problems. It's also a good idea to know your test results and keep a list of the medicines you take. How can you care for yourself at home? Following the DASH diet · Eat 4 to 5 servings of fruit each day. A serving is 1 medium-sized piece of fruit, ½ cup chopped or canned fruit, 1/4 cup dried fruit, or 4 ounces (½ cup) of fruit juice. Choose fruit more often than fruit juice. · Eat 4 to 5 servings of vegetables each day. A serving is 1 cup of lettuce or raw leafy vegetables, ½ cup of chopped or cooked vegetables, or 4 ounces (½ cup) of vegetable juice. Choose vegetables more often than vegetable juice. · Get 2 to 3 servings of low-fat and fat-free dairy each day. A serving is 8 ounces of milk, 1 cup of yogurt, or 1 ½ ounces of cheese. · Eat 6 to 8 servings of grains each day. A serving is 1 slice of bread, 1 ounce of dry cereal, or ½ cup of cooked rice, pasta, or cooked cereal. Try to choose whole-grain products as much as possible. · Limit lean meat, poultry, and fish to 2 servings each day. A serving is 3 ounces, about the size of a deck of cards. · Eat 4 to 5 servings of nuts, seeds, and legumes (cooked dried beans, lentils, and split peas) each week. A serving is 1/3 cup of nuts, 2 tablespoons of seeds, or ½ cup of cooked beans or peas. · Limit fats and oils to 2 to 3 servings each day. A serving is 1 teaspoon of vegetable oil or 2 tablespoons of salad dressing. · Limit sweets and added sugars to 5 servings or less a week. A serving is 1 tablespoon jelly or jam, ½ cup sorbet, or 1 cup of lemonade. · Eat less than 2,300 milligrams (mg) of sodium a day. If you limit your sodium to 1,500 mg a day, you can lower your blood pressure even more. Tips for success · Start small. Do not try to make dramatic changes to your diet all at once. You might feel that you are missing out on your favorite foods and then be more likely to not follow the plan. Make small changes, and stick with them. Once those changes become habit, add a few more changes. · Try some of the following: ? Make it a goal to eat a fruit or vegetable at every meal and at snacks. This will make it easy to get the recommended amount of fruits and vegetables each day. ? Try yogurt topped with fruit and nuts for a snack or healthy dessert. ? Add lettuce, tomato, cucumber, and onion to sandwiches. ? Combine a ready-made pizza crust with low-fat mozzarella cheese and lots of vegetable toppings. Try using tomatoes, squash, spinach, broccoli, carrots, cauliflower, and onions. ? Have a variety of cut-up vegetables with a low-fat dip as an appetizer instead of chips and dip. ? Sprinkle sunflower seeds or chopped almonds over salads.  Or try adding chopped walnuts or almonds to cooked vegetables. ? Try some vegetarian meals using beans and peas. Add garbanzo or kidney beans to salads. Make burritos and tacos with mashed cisse beans or black beans. Where can you learn more? Go to http://pavan-erna.info/. Enter Q670 in the search box to learn more about \"DASH Diet: Care Instructions. \" Current as of: July 22, 2018 Content Version: 11.9 © 0018-6201 [employer]. Care instructions adapted under license by [employer] (which disclaims liability or warranty for this information). If you have questions about a medical condition or this instruction, always ask your healthcare professional. [address] any warranty or liability for your use of this information.

## 2019-05-23 NOTE — PROGRESS NOTES
This note will not be viewable in [address] Subjective:     Mr. Ganga Yoo presents the office today in follow-up of his hypertension along with recent hyperkalemia and chronic kidney disease stage III. His ACE inhibitor was discontinued. He continues to follow his blood pressures at home and notes that they have been completely normal.  He denies any dizziness, headaches, muscle cramping.     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 1/16/2018    Cancer ([address] 2008    esophageal (chemo and radiation completed 2011)    Diverticulosis     Drug-induced polyneuropathy ([address] 1/16/2018    Due to chemotherapy    GERD with stricture 1/16/2018    Status post dilatation    Hx of colonic polyps     Hyperkalemia 4/29/2019    Hypertension     Kidney cyst     CHAVEZ (nonalcoholic steatohepatitis) 2000    \"fatty liver\" diet related    Personal history of colonic polyps 12/6/2012    Stage 3 chronic kidney disease ([facility] 4/29/2019    Thrombocytopenia (HCC)     r/t chemo    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    Vitamin D deficiency 1/16/2018     Past Surgical History:   Procedure Laterality Date    CHEST SURGERY PROCEDURE UNLISTED      esphogus removed; stomach in chest cavity    CHEST SURGERY PROCEDURE UNLISTED      left chcest prtacath    COLONOSCOPY N/A 6/23/2016    COL[facility] San[facility] at [facility] ENDOSCOPY    HX CHOLECYSTECTOMY  9/29/11    HX GI      hemorrhoidectomy    HX HEENT      cataract    HX HERNIA REPAIR      HX OTHER SURGICAL      esophagus tumor removed    HX VASCULAR ACCESS Right     removed 2014    [facility] COL[facility] FLX W/RMVL OF TUMOR POLYP LESION SNARE TQ  7/1/2010         [facility] EGD BALLOON DILATION ESOPHAGUS <30 MM DIAM  3/3/2011         [facility] EGD BALLOON DILATION ESOPHAGUS <30 MM DIAM  5/22/2012         [facility] EGD TRANSORAL BIOPSY SINGLE/MULTIPLE  3/29/2012         UPPER GI ENDOSCOPY,RISA VILLANUEVA,30MM  2018          No Known Allergies  Current Outpatient Medications   Medication Sig Dispense Refill    hydroCHLOROthiazide (HYDRODIURIL) 12.5 mg tablet TAKE 1 TABLET EVERY OTHER DAY 45 Tab 2    esomeprazole (NEXIUM) 40 mg capsule TAKE 1 CAPSULE DAILY 90 Cap 1    folic acid-vit P7-HILLARY I91 (FOLGARD RX) 2.2-25-1 mg tab Take  by mouth.  cholecalciferol, VITAMIN D3, (VITAMIN D3) 5,000 unit tab tablet Take 5,000 Units by mouth daily.  VITAMINS A AND D PO Take 1 Cap by mouth daily.  sildenafil citrate (VIAGRA) 50 mg tablet Take 50 mg by mouth as needed.  CHOLESTYRAMINE/ASPARTAME (PREVALITE PO) Take 5.5 g by mouth every evening.  ranitidine (ZANTAC) 150 mg tablet Take 150 mg by mouth every morning.  CYANOCOBALAMIN (VITAMIN B-12 PO) Take 1,000 mg by mouth daily. 2 tablets      pyridoxine (VITAMIN B-6) 100 mg tablet Take 100 mg by mouth daily.  MULTIVITAMINS (MULTIVITAMIN PO) Take  by mouth daily.        Social History     Socioeconomic History    Marital status:      Spouse name: Not on file    Number of children: Not on file    Years of education: Not on file    Highest education level: Not on file   Tobacco Use    Smoking status: Former Smoker     Packs/day: 2.00     Years: 25.00     Pack years: 50.00     Last attempt to quit: 1987     Years since quittin.4    Smokeless tobacco: Never Used   Substance and Sexual Activity    Alcohol use: No    Drug use: No     Family History   Problem Relation Age of Onset    Diabetes Mother     Arthritis-osteo Father     Cancer Sister         lung    Cancer Sister         breast       Review of Systems:  GEN: no weight loss, weight gain, fatigue or night sweats  CV: no PND, orthopnea, or palpitations  Resp: no dyspnea on exertion, no cough  Abd: no nausea, vomiting or diarrhea  EXT: denies edema, claudication  Endocrine: no hair loss, excessive thirst or polyuria  Neurological ROS: no TIA or stroke symptoms  ROS otherwise negative      Objective:     Visit Vitals  /76   Pulse (!) 58   Temp 97.4 °F (36.3 °C) (Oral)   Ht 5' 7\" (1.702 m)   Wt 168 lb 9.6 oz (76.5 kg)   SpO2 98%   BMI 26.41 kg/m²     Body mass index is 26.41 kg/m². General:   alert, cooperative and no distress   Eyes: conjunctivae/sclerae clear. PERRL, EOM's intact   Mouth:  No oral lesions, no pharyngeal erythema, no exudates   Neck: Trachea midline, no thyromegaly, no bruits   Heart: S1 and S2 normal,no murmurs noted    Lungs: Clear to auscultation bilaterally, no increased work of breathing   Abdomen: Soft, nontender. Normal bowel sounds   Extremities: No edema or cyanosis   Neuro: ..alert, oriented x3,speech normal in context and clarity, cranial nerves II-XII intact,motor strength: full proximally and distally,gait: normal  reflexes: full and symmetric     Physical exam otherwise negative         Assessment/Plan:     Diagnoses and all orders for this visit:    Essential hypertension  -     COLLECTION VENOUS BLOOD,VENIPUNCTURE  -     METABOLIC PANEL, BASIC    Hyperkalemia    Stage 3 chronic kidney disease ([address]        Other instructions: The patient's medications are reviewed and reconciled. His blood pressure remains adequately controlled and no further antihypertensive medication will be added to his regimen. A follow-up BMP to recheck his kidney function and potassium level will be obtained today    Follow-up in 3 months time as previously scheduled    Follow-up and Dispositions    · Return for As previously scheduled.          Jennifer Hairston MD

## 2019-08-13 ENCOUNTER — OFFICE VISIT (OUTPATIENT)
Dept: INTERNAL MEDICINE CLINIC | Age: 79
End: 2019-08-13

## 2019-08-13 VITALS
RESPIRATION RATE: 14 BRPM | WEIGHT: 168.4 LBS | BODY MASS INDEX: 26.43 KG/M2 | TEMPERATURE: 97.7 F | SYSTOLIC BLOOD PRESSURE: 118 MMHG | OXYGEN SATURATION: 98 % | HEART RATE: 71 BPM | HEIGHT: 67 IN | DIASTOLIC BLOOD PRESSURE: 70 MMHG

## 2019-08-13 DIAGNOSIS — I10 ESSENTIAL HYPERTENSION: Chronic | ICD-10-CM

## 2019-08-13 DIAGNOSIS — Z86.010 HISTORY OF COLONIC POLYPS: Chronic | ICD-10-CM

## 2019-08-13 DIAGNOSIS — K21.9 GERD WITH STRICTURE: Chronic | ICD-10-CM

## 2019-08-13 DIAGNOSIS — K22.2 GERD WITH STRICTURE: Chronic | ICD-10-CM

## 2019-08-13 DIAGNOSIS — E11.9 TYPE 2 DIABETES MELLITUS WITHOUT COMPLICATION, WITHOUT LONG-TERM CURRENT USE OF INSULIN (HCC): Primary | Chronic | ICD-10-CM

## 2019-08-13 DIAGNOSIS — G62.0 DRUG-INDUCED POLYNEUROPATHY (HCC): Chronic | ICD-10-CM

## 2019-08-13 DIAGNOSIS — E55.9 VITAMIN D DEFICIENCY: Chronic | ICD-10-CM

## 2019-08-13 DIAGNOSIS — Z85.01 HISTORY OF ESOPHAGEAL CANCER: Chronic | ICD-10-CM

## 2019-08-13 LAB
25(OH)D3 SERPL-MCNC: 36 NG/ML (ref 30–96)
A-G RATIO,AGRAT: 1.4 RATIO
ALBUMIN SERPL-MCNC: 4.1 G/DL (ref 3.9–5.4)
ALP SERPL-CCNC: 109 U/L (ref 38–126)
ALT SERPL-CCNC: 15 U/L (ref 0–50)
ANION GAP SERPL CALC-SCNC: 12 MMOL/L
AST SERPL W P-5'-P-CCNC: 25 U/L (ref 14–36)
BILIRUB SERPL-MCNC: 0.4 MG/DL (ref 0.2–1.3)
BILIRUB UR QL: NEGATIVE
BUN SERPL-MCNC: 16 MG/DL (ref 9–20)
BUN/CREATININE RATIO,BUCR: 13 RATIO
CALCIUM SERPL-MCNC: 9.6 MG/DL (ref 8.4–10.2)
CHLORIDE SERPL-SCNC: 102 MMOL/L (ref 98–107)
CHOL/HDL RATIO,CHHD: 3 RATIO (ref 0–4)
CHOLEST SERPL-MCNC: 139 MG/DL (ref 0–200)
CLARITY: CLEAR
CO2 SERPL-SCNC: 28 MMOL/L (ref 22–32)
COLOR UR: ABNORMAL
CREAT SERPL-MCNC: 1.2 MG/DL (ref 0.8–1.5)
GLOBULIN,GLOB: 3
GLUCOSE 24H UR-MRATE: NEGATIVE G/(24.H)
GLUCOSE SERPL-MCNC: 96 MG/DL (ref 75–110)
HDLC SERPL-MCNC: 53 MG/DL (ref 35–130)
HGB UR QL STRIP: NEGATIVE
KETONES UR QL STRIP.AUTO: NEGATIVE
LDL/HDL RATIO,LDHD: 1 RATIO
LDLC SERPL CALC-MCNC: 68 MG/DL (ref 0–130)
LEUKOCYTE ESTERASE: NEGATIVE
MICROALBUMIN, URINE: NEGATIVE MG/L (ref 0–20)
NITRITE UR QL STRIP.AUTO: NEGATIVE
PH UR STRIP: 6 [PH] (ref 5–7)
POTASSIUM SERPL-SCNC: 5.7 MMOL/L (ref 3.6–5)
PROT SERPL-MCNC: 7.1 G/DL (ref 6.3–8.2)
PROT UR STRIP-MCNC: NEGATIVE MG/DL
RBC #/AREA URNS HPF: 0 #/HPF
SODIUM SERPL-SCNC: 142 MMOL/L (ref 137–145)
SP GR UR REFRACTOMETRY: 1.02 (ref 1–1.03)
SQUAMOUS EPITHELIAL CELLS: ABNORMAL
TRIGL SERPL-MCNC: 90 MG/DL (ref 0–200)
TSH SERPL DL<=0.05 MIU/L-ACNC: 0.98 UIU/ML (ref 0.34–5.6)
UROBILINOGEN UR QL STRIP.AUTO: NEGATIVE
VLDLC SERPL CALC-MCNC: 18 MG/DL
WBC URNS QL MICRO: 0 #/HPF

## 2019-08-13 NOTE — PATIENT INSTRUCTIONS

## 2019-08-13 NOTE — PROGRESS NOTES
This note will not be viewable in [address] Austin Craig is a 78 y.o. male and presents with Diabetes (6 mo fu)  . Subjective:  Mr. Lesvia Burdick returns to the office today in follow-up of multiple medical problems. The patient has type 2 diabetes mellitus which is being managed on diet and exercise. The patient checks his blood sugars once daily with average fasting blood sugar around 108. The patient denies polyuria, polydipsia or blurred vision and has had no unexplained weight loss. He is up-to-date on his diabetic retinal eye exam.    He has hypertension which is currently being managed with diet and salt restriction. He denies any dizziness, headaches. He has had no numbness tingling or focal neurological problems. Vitamin D deficiency is currently being supplemented. There is no history of osteoporosis, falls or fractures. The patient has a history of GERD and a history of esophageal cancer. He has had strictures in the past and this is followed by Dr. Francisco Mendes. He periodically undergoes dilatation of the esophagus. He currently is on Nexium and ranitidine and this is gone a long way toward preventing any of the complications that he is previously had. 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 He notes that this is improved over time and he has been taking B vitamins and other supplements to help this.     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 1/16/2018    Cancer ([facility] [address] 2008    esophageal (chemo and radiation completed 2011)    Diverticulosis     Drug-induced polyneuropathy ([address] 1/16/2018    Due to chemotherapy    GERD with stricture 1/16/2018    Status post dilatation    Hx of colonic polyps     Hyperkalemia 4/29/2019    Hypertension     Kidney cyst     CHAVEZ (nonalcoholic steatohepatitis) 2000    \"fatty liver\" diet related    Personal history of colonic polyps 12/6/2012    Stage 3 chronic kidney disease ([facility] [address] 4/29/2019    Thrombocytopenia (HCC)     r/t chemo    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    Vitamin D deficiency 1/16/2018     Past Surgical History:   Procedure Laterality Date    CHEST SURGERY PROCEDURE UNLISTED      esphogus removed; stomach in chest cavity    CHEST SURGERY PROCEDURE UNLISTED      left chcest prtacath    COLONOSCOPY N/A 6/23/2016    COLONOSCOPY performed by Arthur Chaney MD at [facility] ENDOSCOPY    HX CHOLECYSTECTOMY  9/29/11    HX GI      hemorrhoidectomy    HX HEENT      cataract    HX HERNIA REPAIR      HX OTHER SURGICAL      esophagus tumor removed    HX VASCULAR ACCESS Right     removed 2014    [place] COLSC FLX W/RMVL OF TUMOR POLYP LESION SNARE TQ  7/1/2010         [place] EGD BALLOON DILATION ESOPHAGUS <30 MM DIAM  3/3/2011         [place] EGD BALLOON DILATION ESOPHAGUS <30 MM DIAM  5/22/2012         [place] E[place] TRANSORAL BIOPSY SINGLE/MULTIPLE  3/29/2012         UPPER GI ENDOSCOPY,BALL DIL,30MM  1/11/2018          No Known Allergies  Current Outpatient Medications   Medication Sig Dispense Refill    hydroCHLOROthiazide (HYDRODIURIL) 12.5 mg tablet TAKE 1 TABLET EVERY OTHER DAY 45 Tab 2    esomeprazole (NEXIUM) 40 mg capsule TAKE 1 CAPSULE DAILY 90 Cap 1    folic acid-vit D1-YBQ V60 (FOLGARD RX) 2.2-25-1 mg tab Take  by mouth.  cholecalciferol, VITAMIN D3, (VITAMIN D3) 5,000 unit tab tablet Take 5,000 Units by mouth daily.  VITAMINS A AND D PO Take 1 Cap by mouth daily.  sildenafil citrate (VIAGRA) 50 mg tablet Take 50 mg by mouth as needed.  CHOLESTYRAMINE/ASPARTAME (PREVALITE PO) Take 5.5 g by mouth every evening.  ranitidine (ZANTAC) 150 mg tablet Take 150 mg by mouth every morning.  CYANOCOBALAMIN (VITAMIN B-12 PO) Take 1,000 mg by mouth daily. 2 tablets      pyridoxine (VITAMIN B-6) 100 mg tablet Take 100 mg by mouth daily.       MULTIVITAMINS (MULTIVITAMIN PO) Take  by mouth daily.        Social History     Socioeconomic History    Marital status:      Spouse name: Not on file    Number of children: Not on file    Years of education: Not on file    Highest education level: Not on file   Tobacco Use    Smoking status: Former Smoker     Packs/day: 2.00     Years: 25.00     Pack years: 50.00     Last attempt to quit: 1987     Years since quittin.6    Smokeless tobacco: Never Used   Substance and Sexual Activity    Alcohol use: No    Drug use: No     Family History   Problem Relation Age of Onset    Diabetes Mother     Arthritis-osteo Father     Cancer Sister         lung    Cancer Sister         breast       Health Maintenance   Topic Date Due    FOOT EXAM Q1  2019    Influenza Age 5 to Adult  2019    HEMOGLOBIN A1C Q6M  2019    MICROALBUMIN Q1  2020    LIPID PANEL Q1  2020    MEDICARE YEARLY EXAM  2020    GLAUCOMA SCREENING Q2Y  2021    EYE EXAM RETINAL OR DILATED  2021    DTaP/Tdap/Td series (2 - Td) 2029    Shingrix Vaccine Age 50>  Completed    Pneumococcal 65+ years  Completed        Review of Systems  Constitutional: negative for fevers, chills, anorexia and weight loss  Eyes:   negative for visual disturbance and irritation  ENT:   negative for tinnitus,sore throat,nasal congestion,ear pain,hoarseness  Respiratory:  negative for cough, hemoptysis, dyspnea,wheezing  CV:   negative for chest pain, palpitations, lower extremity edema  GI:   negative for nausea, vomiting, diarrhea, abdominal pain,melena  Endo:               negative for polyuria,polydipsia,polyphagia,heat intolerance  Genitourinary: negative for frequency, dysuria and hematuria  Integumentary: negative for rash and pruritus  Hematologic:  negative for easy bruising and gum/nose bleeding  Musculoskel: negative for myalgias, arthralgias, back pain, muscle weakness, joint pain  Neurological: negative for headaches, dizziness, vertigo, memory problems and gait   Behavl/Psych: negative for feelings of anxiety, depression, mood changes  ROS otherwise negative      Objective:  Visit Vitals  /70 (BP 1 Location: Left arm, BP Patient Position: Sitting)   Pulse 71   Temp 97.7 °F (36.5 °C) (Oral)   Resp 14   Ht 5' 7\" (1.702 m)   Wt 168 lb 6.4 oz (76.4 kg)   SpO2 98%   BMI 26.38 kg/m²     Body mass index is 26.38 kg/m². Physical Exam:   General appearance - alert, well appearing, and in no distress  Mental status - alert, oriented to person, place, and time  EYE-LUÍS, EOMI,conjunctiva normal bilaterally, lids normal  ENT-ENT exam normal, no neck nodes or sinus tenderness  Nose - normal and patent, no erythema,  Or discharge   Mouth - mucous membranes moist, pharynx normal without lesions  Neck - supple, no significant adenopathy or bruit  Chest - clear to auscultation, no wheezes, rales or rhonchi. Heart - normal rate, regular rhythm, normal S1, S2, no murmurs, rubs, clicks or gallops   Abdomen - soft, nontender, nondistended, no masses or organomegaly  Lymph- no adenopathy palpable  Ext-peripheral pulses normal, no pedal edema, no clubbing or cyanosis  Skin-Warm and dry.  no hyperpigmentation, vitiligo, or suspicious lesions  Neuro -alert, oriented, normal speech, no focal findings or movement disorder noted  Diabetic foot exam:     Left Foot:   Visual Exam: normal    Pulse DP: 2+ (normal)   Filament test: normal sensation    Vibratory sensation: normal      Right Foot:   Visual Exam: normal    Pulse DP: 2+ (normal)   Filament test: normal sensation    Vibratory sensation: normal        Assessment/Plan:  Diagnoses and all orders for this visit:    Type 2 diabetes mellitus without complication, without long-term current use of insulin ([facility])  -      DIABETES FOOT EXAM  -     HEMOGLOBIN A1C W/O EAG  -     TSH 3RD GENERATION  -     URINE, MICROALBUMIN, SEMIQUANTITATIVE    Essential hypertension  - CBC WITH AUTOMATED DIFF  -     COLLECTION VENOUS BLOOD,VENIPUNCTURE  -     LIPID PANEL  -     METABOLIC PANEL, COMPREHENSIVE  -     URINALYSIS W/MICROSCOPIC    GERD with stricture    Vitamin D deficiency  -     VITAMIN D, 25 HYDROXY    History of esophageal cancer    History of colonic polyps    Drug-induced polyneuropathy (HCC)        Other instructions: The patient's medications were reviewed and reconciled. No change in his current medical regimen is made. Body mass index is 26.4 and dietary counseling along with printed patient education is given    Continue to check blood sugar once daily    Await results of multiple labs    Health maintenance issues were reviewed and he will be due for influenza vaccination this fall    Follow-up in 6 months    Follow-up and Dispositions    · Return in about 6 months (around 2/13/2020). I have reviewed with the patient details of the assessment and plan and all questions were answered. Relevent patient education was performed. The most recent lab findings were reviewed with the patient. An After Visit Summary was printed and given to the patient.     Inés Webb MD

## 2019-08-14 LAB
BASOPHILS # BLD AUTO: 0.1 X10E3/UL (ref 0–0.2)
BASOPHILS NFR BLD AUTO: 1 %
EOSINOPHIL # BLD AUTO: 0.3 X10E3/UL (ref 0–0.4)
EOSINOPHIL NFR BLD AUTO: 4 %
ERYTHROCYTE [DISTWIDTH] IN BLOOD BY AUTOMATED COUNT: 13.1 % (ref 12.3–15.4)
HBA1C MFR BLD HPLC: 5.4 % (ref 4–5.7)
HCT VFR BLD AUTO: 43.2 % (ref 37.5–51)
HGB BLD-MCNC: 14.1 G/DL (ref 13–17.7)
IMM GRANULOCYTES # BLD AUTO: 0 X10E3/UL (ref 0–0.1)
IMM GRANULOCYTES NFR BLD AUTO: 0 %
LYMPHOCYTES # BLD AUTO: 2 X10E3/UL (ref 0.7–3.1)
LYMPHOCYTES NFR BLD AUTO: 28 %
MCH RBC QN AUTO: 27.5 PG (ref 26.6–33)
MCHC RBC AUTO-ENTMCNC: 32.6 G/DL (ref 31.5–35.7)
MCV RBC AUTO: 84 FL (ref 79–97)
MONOCYTES # BLD AUTO: 0.6 X10E3/UL (ref 0.1–0.9)
MONOCYTES NFR BLD AUTO: 8 %
NEUTROPHILS # BLD AUTO: 4.3 X10E3/UL (ref 1.4–7)
NEUTROPHILS NFR BLD AUTO: 59 %
PLATELET # BLD AUTO: 192 X10E3/UL (ref 150–450)
RBC # BLD AUTO: 5.13 X10E6/UL (ref 4.14–5.8)
WBC # BLD AUTO: 7.2 X10E3/UL (ref 3.4–10.8)

## 2019-10-11 ENCOUNTER — CLINICAL SUPPORT (OUTPATIENT)
Dept: INTERNAL MEDICINE CLINIC | Age: 79
End: 2019-10-11

## 2019-10-11 VITALS
HEART RATE: 89 BPM | HEIGHT: 67 IN | RESPIRATION RATE: 18 BRPM | BODY MASS INDEX: 26.68 KG/M2 | WEIGHT: 170 LBS | DIASTOLIC BLOOD PRESSURE: 83 MMHG | OXYGEN SATURATION: 99 % | TEMPERATURE: 97 F | SYSTOLIC BLOOD PRESSURE: 103 MMHG

## 2019-10-11 DIAGNOSIS — Z23 ENCOUNTER FOR IMMUNIZATION: ICD-10-CM

## 2019-10-11 NOTE — PROGRESS NOTES
Deirdre Jeong  Identified patient with two patient identifiers(name and ). Chief Complaint   Patient presents with    Immunization/Injection           Vitals:    10/11/19 1348   BP: 103/83   Pulse: 89   Resp: 18   Temp: 97 °F (36.1 °C)   TempSrc: Oral   SpO2: 99%   Weight: 170 lb (77.1 kg)   Height: 5' 7\" (1.702 m)   PainSc:   0 - No pain           Fluad Immunization/s administered on 10/11/2019 by Tess Suarez LPN per Ed Silva MD with patients consent signed. Patient tolerated procedure well with no complaints. Patient monitored for 5 minutes with no reactions noted. Learning Assessment:  :     Learning Assessment 2018   PRIMARY LEARNER Patient   HIGHEST LEVEL OF EDUCATION - PRIMARY LEARNER  GRADUATED HIGH SCHOOL OR GED   BARRIERS PRIMARY LEARNER NONE   CO-LEARNER CAREGIVER No   PRIMARY LANGUAGE ENGLISH   LEARNER PREFERENCE PRIMARY DEMONSTRATION   ANSWERED BY patient   RELATIONSHIP SELF       Depression Screening:  :     3 most recent PHQ Screens 2019   Little interest or pleasure in doing things Not at all   Feeling down, depressed, irritable, or hopeless Not at all   Total Score PHQ 2 0       Fall Risk Assessment:  :     Fall Risk Assessment, last 12 mths 2019   Able to walk? Yes   Fall in past 12 months? No       Abuse Screening:  :     Abuse Screening Questionnaire 2019   Do you ever feel afraid of your partner? N N   Are you in a relationship with someone who physically or mentally threatens you? N N   Is it safe for you to go home?  Y Y       ADL Screening:  :     ADL Assessment 2019   Feeding yourself No Help Needed   Getting from bed to chair No Help Needed   Getting dressed No Help Needed   Bathing or showering No Help Needed   Walk across the room (includes cane/walker) No Help Needed   Using the telphone No Help Needed   Taking your medications No Help Needed   Preparing meals No Help Needed   Managing money (expenses/bills) No Help Needed Moderately strenuous housework (laundry) No Help Needed   Shopping for personal items (toiletries/medicines) No Help Needed   Shopping for groceries No Help Needed   Driving No Help Needed   Climbing a flight of stairs No Help Needed   Getting to places beyond walking distances No Help Needed

## 2019-11-12 ENCOUNTER — OFFICE VISIT (OUTPATIENT)
Dept: INTERNAL MEDICINE CLINIC | Age: 79
End: 2019-11-12

## 2019-11-12 ENCOUNTER — TELEPHONE (OUTPATIENT)
Dept: INTERNAL MEDICINE CLINIC | Age: 79
End: 2019-11-12

## 2019-11-12 VITALS
TEMPERATURE: 97.8 F | OXYGEN SATURATION: 98 % | RESPIRATION RATE: 22 BRPM | BODY MASS INDEX: 27.56 KG/M2 | WEIGHT: 175.6 LBS | HEIGHT: 67 IN | HEART RATE: 89 BPM | SYSTOLIC BLOOD PRESSURE: 118 MMHG | DIASTOLIC BLOOD PRESSURE: 80 MMHG

## 2019-11-12 DIAGNOSIS — J20.9 ACUTE BRONCHITIS, UNSPECIFIED ORGANISM: Primary | ICD-10-CM

## 2019-11-12 RX ORDER — BENZONATATE 200 MG/1
200 CAPSULE ORAL
Qty: 21 CAP | Refills: 0 | Status: SHIPPED | OUTPATIENT
Start: 2019-11-12 | End: 2019-11-19

## 2019-11-12 RX ORDER — AZITHROMYCIN 250 MG/1
250 TABLET, FILM COATED ORAL SEE ADMIN INSTRUCTIONS
Qty: 6 TAB | Refills: 0 | Status: SHIPPED | OUTPATIENT
Start: 2019-11-12 | End: 2020-01-30

## 2019-11-12 RX ORDER — PREDNISONE 5 MG/1
TABLET ORAL
Qty: 30 TAB | Refills: 0 | Status: SHIPPED | OUTPATIENT
Start: 2019-11-12 | End: 2020-01-30

## 2019-11-12 NOTE — PROGRESS NOTES
This note will not be viewable in [address] Ankita Hennessy is a 78 y.o. male and presents with Cough  . Subjective:  Child's presents to the office today with 2 weeks worth of an upper respiratory infection that began with fever and mild chills and then left him with a dry cough. The cough is been paroxysmal and only intermittently productive of phlegm. There is been no wheezing or shortness of breath. He denies rhinorrhea or sore throat. There is been no neck stiffness or rash. He is tried over-the-counter medications without relief.     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 1/16/2018    Cancer ([facility] [address] 2008    esophageal (chemo and radiation completed 2011)    Diverticulosis     Drug-induced polyneuropathy ([facility] [address] 1/16/2018    Due to chemotherapy    GERD with stricture 1/16/2018    Status post dilatation    Hx of colonic polyps     Hyperkalemia 4/29/2019    Hypertension     Kidney cyst     CHAVEZ (nonalcoholic steatohepatitis) 2000    \"fatty liver\" diet related    Personal history of colonic polyps 12/6/2012    Stage 3 chronic kidney disease ([facility] [address] 4/29/2019    Thrombocytopenia (HCC)     r/t chemo    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    Vitamin D deficiency 1/16/2018     Past Surgical History:   Procedure Laterality Date    CHEST SURGERY PROCEDURE UNLISTED      esphogus removed; stomach in chest cavity    CHEST SURGERY PROCEDURE UNLISTED      left chcest prtacath    COLONOSCOPY N/A 6/23/2016    COLONOSCOPY performed by Mary Tai MD at [facility]"" ENDOSCOPY    HX CHOLECYSTECTOMY  9/29/11    HX GI      hemorrhoidectomy    HX HEENT      cataract    HX HERNIA REPAIR      HX OTHER SURGICAL      esophagus tumor removed    HX VASCULAR ACCESS Right     removed 2014    [place] COLSC FLX W/RMVL OF TUMOR POLYP LESION SNARE TQ  7/1/2010         [place] EGD BALLOON DILATION ESOPHAGUS <30 MM DIAM  3/3/2011         [place] EGD BALLOON DILATION ESOPHAGUS <30 MM DIAM  2012         [place] EGD TRANSORAL BIOPSY SINGLE/MULTIPLE  3/29/2012         UPPER GI ENDOSCOPY,BALL DIL,30MM  2018          No Known Allergies  Current Outpatient Medications   Medication Sig Dispense Refill    azithromycin (ZITHROMAX) 250 mg tablet Take 1 Tab by mouth See Admin Instructions. 6 Tab 0    predniSONE (DELTASONE) 5 mg tablet 5 tablets day for days 1 through 4, then 4 tablets on day 5, then 3 tablets on day 6, then 2 tablets on day 7, then 1 tablet on day 8. 30 Tab 0    benzonatate (TESSALON) 200 mg capsule Take 1 Cap by mouth three (3) times daily as needed for Cough for up to 7 days. 21 Cap 0    hydroCHLOROthiazide (HYDRODIURIL) 12.5 mg tablet TAKE 1 TABLET EVERY OTHER DAY 45 Tab 2    esomeprazole (NEXIUM) 40 mg capsule TAKE 1 CAPSULE DAILY 90 Cap 1    folic acid-vit X3-DRV H38 (FOLGARD RX) 2.2-25-1 mg tab Take  by mouth.  cholecalciferol, VITAMIN D3, (VITAMIN D3) 5,000 unit tab tablet Take 5,000 Units by mouth daily.  VITAMINS A AND D PO Take 1 Cap by mouth daily.  sildenafil citrate (VIAGRA) 50 mg tablet Take 50 mg by mouth as needed.  CHOLESTYRAMINE/ASPARTAME (PREVALITE PO) Take 5.5 g by mouth every evening.  ranitidine (ZANTAC) 150 mg tablet Take 150 mg by mouth every morning.  CYANOCOBALAMIN (VITAMIN B-12 PO) Take 1,000 mg by mouth daily. 2 tablets      pyridoxine (VITAMIN B-6) 100 mg tablet Take 100 mg by mouth daily.  MULTIVITAMINS (MULTIVITAMIN PO) Take  by mouth daily.        Social History     Socioeconomic History    Marital status:      Spouse name: Not on file    Number of children: Not on file    Years of education: Not on file    Highest education level: Not on file   Tobacco Use    Smoking status: Former Smoker     Packs/day: 2.00     Years: 25.00     Pack years: 50.00     Last attempt to quit: 1987     Years since quittin.8    Smokeless tobacco: Never Used   Substance and Sexual Activity    Alcohol use: No    Drug use: No     Family History   Problem Relation Age of Onset    Diabetes Mother     Arthritis-osteo Father     Cancer Sister         lung    Cancer Sister         breast       Review of Systems  Constitutional: negative for fevers, chills, anorexia and weight loss  Eyes:   negative for visual disturbance and irritation  ENT:   Positive for some sinus congestion and post nasal drainage. Respiratory:  Positive for cough and chest congestion without wheezing  CV:   negative for chest pain, palpitations, lower extremity edema  GI:   negative for nausea, vomiting, diarrhea, abdominal pain,melena  Integumentary: negative for rash and pruritus  Neurological:  negative for headaches, dizziness, vertigo, memory problems and gait       Objective:  Visit Vitals  /80 (BP 1 Location: Right arm, BP Patient Position: Sitting)   Pulse 89   Temp 97.8 °F (36.6 °C) (Oral)   Resp 22   Ht 5' 7\" (1.702 m)   Wt 175 lb 9.6 oz (79.7 kg)   SpO2 98%   BMI 27.50 kg/m²     Body mass index is 27.5 kg/m². Physical Exam:   General appearance - alert, ill appearing, and in no distress  Mental status - alert, oriented to person, place, and time  EYE-LUÍS, EOMI, conjuctiva clear. No lid swelling or purulent drainage  ENT- TM's clear without A/F level. Pharynx slightly erythematous with drainage noted  Nose - normal and patent, no erythema,  Neck - supple, no significant adenopathy   Chest - Coarse upper airway rhonchi present without wheezing   Heart - normal rate, regular rhythm, normal S1, S2, no murmurs, rubs, clicks or gallops   Skin-No rash appreciated  Neuro -alert, oriented, normal speech, no focal findings. Assessment/Plan:  Diagnoses and all orders for this visit:    Acute bronchitis, unspecified organism  -     azithromycin (ZITHROMAX) 250 mg tablet; Take 1 Tab by mouth See Admin Instructions. , Normal, Disp-6 Tab, R-0  -     predniSONE (DELTASONE) 5 mg tablet; 5 tablets day for days 1 through 4, then 4 tablets on day 5, then 3 tablets on day 6, then 2 tablets on day 7, then 1 tablet on day 8., Normal, Disp-30 Tab, R-0  -     benzonatate (TESSALON) 200 mg capsule; Take 1 Cap by mouth three (3) times daily as needed for Cough for up to 7 days. , Normal, Disp-21 Cap, R-0        Other Instructions: The patient's medications were reviewed and reconciled. No change in his current medical regimen is made. Will place on azithromycin, tapering prednisone and cough suppressant to help him with his persistent bronchitis. Mucinex as directed    Increase po fluids    He understands that he is to return in 2 weeks time should there be no improvement in his cough. Follow-up and Dispositions    · Return if symptoms worsen or fail to improve. I have reviewed with the patient details of the assessment and plan and all questions were answered. Relevent patient education was performed. An After Visit Summary was printed and given to the patient.     Xuan Borges MD

## 2019-11-12 NOTE — PROGRESS NOTES
Saritha Conteh is a 78 y.o. male presenting for Cough  . 1. Have you been to the ER, urgent care clinic since your last visit? Hospitalized since your last visit? No    2. Have you seen or consulted any other health care providers outside of the [address] [place] [address] since your last visit? Include any pap smears or colon screening. No    Fall Risk Assessment, last 12 mths 2/5/2019   Able to walk? Yes   Fall in past 12 months? No         Abuse Screening Questionnaire 2/5/2019   Do you ever feel afraid of your partner? N   Are you in a relationship with someone who physically or mentally threatens you? N   Is it safe for you to go home? Y       3 most recent PHQ Screens 2/5/2019   Little interest or pleasure in doing things Not at all   Feeling down, depressed, irritable, or hopeless Not at all   Total Score PHQ 2 0       There are no discontinued medications.

## 2019-11-12 NOTE — PATIENT INSTRUCTIONS
Bronchitis: Care Instructions Your Care Instructions Bronchitis is inflammation of the bronchial tubes, which carry air to the lungs. The tubes swell and produce mucus, or phlegm. The mucus and inflamed bronchial tubes make you cough. You may have trouble breathing. Most cases of bronchitis are caused by viruses like those that cause colds. Antibiotics usually do not help and they may be harmful. Bronchitis usually develops rapidly and lasts about 2 to 3 weeks in otherwise healthy people. Follow-up care is a key part of your treatment and safety. Be sure to make and go to all appointments, and call your doctor if you are having problems. It's also a good idea to know your test results and keep a list of the medicines you take. How can you care for yourself at home? · Take all medicines exactly as prescribed. Call your doctor if you think you are having a problem with your medicine. · Get some extra rest. 
· Take an over-the-counter pain medicine, such as acetaminophen (Tylenol), ibuprofen (Advil, Motrin), or naproxen (Aleve) to reduce fever and relieve body aches. Read and follow all instructions on the label. · Do not take two or more pain medicines at the same time unless the doctor told you to. Many pain medicines have acetaminophen, which is Tylenol. Too much acetaminophen (Tylenol) can be harmful. · Take an over-the-counter cough medicine that contains dextromethorphan to help quiet a dry, hacking cough so that you can sleep. Avoid cough medicines that have more than one active ingredient. Read and follow all instructions on the label. · Breathe moist air from a humidifier, hot shower, or sink filled with hot water. The heat and moisture will thin mucus so you can cough it out. · Do not smoke. Smoking can make bronchitis worse. If you need help quitting, talk to your doctor about stop-smoking programs and medicines. These can increase your chances of quitting for good. When should you call for help? Call 911 anytime you think you may need emergency care. For example, call if: 
  · You have severe trouble breathing.  
 Call your doctor now or seek immediate medical care if: 
  · You have new or worse trouble breathing.  
  · You cough up dark brown or bloody mucus (sputum).  
  · You have a new or higher fever.  
  · You have a new rash.  
 Watch closely for changes in your health, and be sure to contact your doctor if: 
  · You cough more deeply or more often, especially if you notice more mucus or a change in the color of your mucus.  
  · You are not getting better as expected. Where can you learn more? Go to http://pavan-erna.info/. Enter H333 in the search box to learn more about \"Bronchitis: Care Instructions. \" Current as of: June 9, 2019 Content Version: 12.2 © 4591-4373 [employer]. Care instructions adapted under license by [employer] (which disclaims liability or warranty for this information). If you have questions about a medical condition or this instruction, always ask your healthcare professional. [address] any warranty or liability for your use of this information.

## 2019-11-12 NOTE — TELEPHONE ENCOUNTER
Patient stated he has a \"horrible cough\". Its been going on for about 2 weeks.     Call back [phone]

## 2020-01-30 ENCOUNTER — ANESTHESIA EVENT (OUTPATIENT)
Dept: SURGERY | Age: 80
End: 2020-01-30
Payer: MEDICARE

## 2020-01-30 RX ORDER — CHOLECALCIFEROL (VITAMIN D3) 125 MCG
1 CAPSULE ORAL DAILY
COMMUNITY

## 2020-01-30 RX ORDER — LISINOPRIL 20 MG/1
20 TABLET ORAL DAILY
COMMUNITY
End: 2022-09-02

## 2020-01-30 NOTE — PERIOP NOTES
[facility]  Ambulatory Surgery Unit  Pre-operative Instructions for Endo Procedures    Procedure Date  1/31            Tentative Arrival Time 06:45am      1. On the day of your procedure, please report to the Ambulatory Surgery Unit Registration Desk and sign in at your designated time. The Ambulatory Surgery Unit is located in [facility] on the [facility] side of the [facility] across from the [address] [place] [address]. Please have all of your health insurance cards and a photo ID. 2. You must have someone with you to drive you home, as you should not drive a car for 24 hours following anesthesia. Please make arrangements for a responsible adult friend or family member to stay with you for at least the first 24 hours after your procedure. 3. Do not have anything to eat or drink (including water, gum, mints, coffee, juice) after 11:59 PM 1/30. This may not apply to medications prescribed by your physician. (Please note below the special instructions with medications to take the morning of your procedure.)    4. If applicable, follow the clear liquid diet and bowel prep instructions provided by your physician's office. If you do not have this information, or have any questions, please contact your physician's office. 5. We recommend you do not drink any alcoholic beverages for 24 hours before and after your procedure. 6. Contact your surgeons office for instructions on the following medications: non-steroidal anti-inflammatory drugs (i.e. Advil, Aleve), vitamins, and supplements. (Some surgeons will want you to stop these medications prior to surgery and others may allow you to take them)   **If you are currently taking Plavix, Coumadin, Aspirin and/or other blood-thinning agents, contact your surgeon for instructions. ** Your surgeon will partner with the physician prescribing these medications to determine if it is safe to stop or if you need to continue taking.  Please do not stop taking these medications without instructions from your surgeon. 7. In an effort to help prevent surgical site infection, we ask that you shower with an anti-bacterial soap (i.e. Dial or Safeguard) on the morning of your procedure. Do not apply any lotions, powders, or deodorants after showering. 8. Wear comfortable clothes. Wear glasses instead of contacts. Do not bring any jewelry or money (other than copays or fees as instructed). Do not wear make-up, particularly mascara, the morning of your procedure. Wear your hair loose or down, no ponytails, buns, jurgen pins or clips. All body piercings must be removed. 9. You should understand that if you do not follow these instructions your procedure may be cancelled. If your physical condition changes (i.e. fever, cold or flu) please contact your surgeon as soon as possible. 10. It is important that you be on time. If a situation occurs where you may be late, or if you have any questions or problems, please call [phone]. 11. Your procedure time may be subject to change. You will receive a phone call the day prior to confirm your arrival time. Special Instructions: Take all medications and inhalers, as prescribed, on the morning of surgery with a sip of water EXCEPT: none      Insulin Dependent Diabetic patients: Take your diabetic medications as prescribed the day before surgery. Hold all diabetic medications the day of surgery. If you are scheduled to arrive for surgery after 8:00 AM, and your AM blood sugar is >200, please call Ambulatory Surgery. I understand a pre-operative phone call will be made to verify my procedure time. In the event that I am not available, I give permission for a message to be left on my answering service and/or with another person?       yes      Reviewed by phone with wife, verbalized understanding.   ___________________      ___________________      ___________________  Jenelle Lobato of Patient) (Witness)                   (Date and Time)

## 2020-01-31 ENCOUNTER — HOSPITAL ENCOUNTER (OUTPATIENT)
Age: 80
Setting detail: OUTPATIENT SURGERY
Discharge: HOME | End: 2020-01-31
Attending: SPECIALIST | Admitting: SPECIALIST
Payer: MEDICARE

## 2020-01-31 ENCOUNTER — ANESTHESIA (OUTPATIENT)
Dept: SURGERY | Age: 80
End: 2020-01-31
Payer: MEDICARE

## 2020-01-31 VITALS
TEMPERATURE: 97.5 F | DIASTOLIC BLOOD PRESSURE: 67 MMHG | WEIGHT: 168 LBS | HEART RATE: 72 BPM | RESPIRATION RATE: 17 BRPM | OXYGEN SATURATION: 97 % | HEIGHT: 68 IN | SYSTOLIC BLOOD PRESSURE: 100 MMHG | BODY MASS INDEX: 25.46 KG/M2

## 2020-01-31 PROCEDURE — 76210000040 HC AMBSU PH I REC FIRST 0.5 HR: Performed by: SPECIALIST

## 2020-01-31 PROCEDURE — 74011250636 HC RX REV CODE- 250/636: Performed by: ANESTHESIOLOGY

## 2020-01-31 PROCEDURE — 74011000250 HC RX REV CODE- 250: Performed by: NURSE ANESTHETIST, CERTIFIED REGISTERED

## 2020-01-31 PROCEDURE — 74011250636 HC RX REV CODE- 250/636: Performed by: NURSE ANESTHETIST, CERTIFIED REGISTERED

## 2020-01-31 PROCEDURE — 77030020255 HC SOL INJ LR 1000ML BG: Performed by: SPECIALIST

## 2020-01-31 PROCEDURE — 77030018712 HC DEV BLLN INFL BSC -B: Performed by: SPECIALIST

## 2020-01-31 PROCEDURE — 77030021352 HC CBL LD SYS DISP COVD -B: Performed by: SPECIALIST

## 2020-01-31 PROCEDURE — 76060000073 HC AMB SURG ANES FIRST 0.5 HR: Performed by: SPECIALIST

## 2020-01-31 PROCEDURE — 76030000002 HC AMB SURG OR TIME FIRST 0.: Performed by: SPECIALIST

## 2020-01-31 PROCEDURE — C1726 CATH, BAL DIL, NON-VASCULAR: HCPCS | Performed by: SPECIALIST

## 2020-01-31 PROCEDURE — 76210000046 HC AMBSU PH II REC FIRST 0.5 HR: Performed by: SPECIALIST

## 2020-01-31 RX ORDER — PROPOFOL 10 MG/ML
INJECTION, EMULSION INTRAVENOUS AS NEEDED
Status: DISCONTINUED | OUTPATIENT
Start: 2020-01-31 | End: 2020-01-31 | Stop reason: HOSPADM

## 2020-01-31 RX ORDER — SODIUM CHLORIDE 0.9 % (FLUSH) 0.9 %
5-40 SYRINGE (ML) INJECTION AS NEEDED
Status: DISCONTINUED | OUTPATIENT
Start: 2020-01-31 | End: 2020-01-31 | Stop reason: HOSPADM

## 2020-01-31 RX ORDER — SODIUM CHLORIDE, SODIUM LACTATE, POTASSIUM CHLORIDE, CALCIUM CHLORIDE 600; 310; 30; 20 MG/100ML; MG/100ML; MG/100ML; MG/100ML
25 INJECTION, SOLUTION INTRAVENOUS CONTINUOUS
Status: DISCONTINUED | OUTPATIENT
Start: 2020-01-31 | End: 2020-01-31 | Stop reason: HOSPADM

## 2020-01-31 RX ORDER — LIDOCAINE HYDROCHLORIDE 20 MG/ML
INJECTION, SOLUTION EPIDURAL; INFILTRATION; INTRACAUDAL; PERINEURAL AS NEEDED
Status: DISCONTINUED | OUTPATIENT
Start: 2020-01-31 | End: 2020-01-31 | Stop reason: HOSPADM

## 2020-01-31 RX ORDER — ONDANSETRON 2 MG/ML
4 INJECTION INTRAMUSCULAR; INTRAVENOUS AS NEEDED
Status: DISCONTINUED | OUTPATIENT
Start: 2020-01-31 | End: 2020-01-31 | Stop reason: HOSPADM

## 2020-01-31 RX ORDER — SODIUM CHLORIDE 0.9 % (FLUSH) 0.9 %
5-40 SYRINGE (ML) INJECTION EVERY 8 HOURS
Status: DISCONTINUED | OUTPATIENT
Start: 2020-01-31 | End: 2020-01-31 | Stop reason: HOSPADM

## 2020-01-31 RX ORDER — EPHEDRINE SULFATE/0.9% NACL/PF 50 MG/5 ML
SYRINGE (ML) INTRAVENOUS AS NEEDED
Status: DISCONTINUED | OUTPATIENT
Start: 2020-01-31 | End: 2020-01-31 | Stop reason: HOSPADM

## 2020-01-31 RX ORDER — LIDOCAINE HYDROCHLORIDE 10 MG/ML
0.1 INJECTION, SOLUTION EPIDURAL; INFILTRATION; INTRACAUDAL; PERINEURAL AS NEEDED
Status: DISCONTINUED | OUTPATIENT
Start: 2020-01-31 | End: 2020-01-31 | Stop reason: HOSPADM

## 2020-01-31 RX ORDER — OXYCODONE AND ACETAMINOPHEN 5; 325 MG/1; MG/1
1 TABLET ORAL
Status: DISCONTINUED | OUTPATIENT
Start: 2020-01-31 | End: 2020-01-31 | Stop reason: HOSPADM

## 2020-01-31 RX ORDER — DIPHENHYDRAMINE HYDROCHLORIDE 50 MG/ML
12.5 INJECTION, SOLUTION INTRAMUSCULAR; INTRAVENOUS AS NEEDED
Status: DISCONTINUED | OUTPATIENT
Start: 2020-01-31 | End: 2020-01-31 | Stop reason: HOSPADM

## 2020-01-31 RX ORDER — FENTANYL CITRATE 50 UG/ML
25 INJECTION, SOLUTION INTRAMUSCULAR; INTRAVENOUS
Status: DISCONTINUED | OUTPATIENT
Start: 2020-01-31 | End: 2020-01-31 | Stop reason: HOSPADM

## 2020-01-31 RX ADMIN — PROPOFOL 40 MG: 10 INJECTION, EMULSION INTRAVENOUS at 07:31

## 2020-01-31 RX ADMIN — PROPOFOL 20 MG: 10 INJECTION, EMULSION INTRAVENOUS at 07:38

## 2020-01-31 RX ADMIN — Medication 15 MG: at 07:38

## 2020-01-31 RX ADMIN — LIDOCAINE HYDROCHLORIDE 100 MG: 20 INJECTION, SOLUTION INTRAVENOUS at 07:31

## 2020-01-31 RX ADMIN — Medication 10 MG: at 07:40

## 2020-01-31 RX ADMIN — PROPOFOL 20 MG: 10 INJECTION, EMULSION INTRAVENOUS at 07:35

## 2020-01-31 RX ADMIN — SODIUM CHLORIDE, SODIUM LACTATE, POTASSIUM CHLORIDE, AND CALCIUM CHLORIDE 25 ML/HR: 600; 310; 30; 20 INJECTION, SOLUTION INTRAVENOUS at 06:50

## 2020-01-31 NOTE — ANESTHESIA PREPROCEDURE EVALUATION
Anesthetic History   No history of anesthetic complications            Review of Systems / Medical History  Patient summary reviewed, nursing notes reviewed and pertinent labs reviewed    Pulmonary                Comments: Former smoker - Quit 1987 - 50 pack years   Neuro/Psych             Comments: Peripheral Neuropathy Cardiovascular    Hypertension: well controlled              Exercise tolerance: >4 METS     GI/Hepatic/Renal     GERD: well controlled      Liver disease    Comments: Esophageal Dysphagia  H/O Esophageal Cancer  CHAVEZ  H/O Colon Polyps  Diverticulosis Endo/Other    Diabetes: well controlled, type 2    Arthritis    Comments: Diet-controlled DMII Other Findings   Comments: Vitamin D Deficiency           Physical Exam    Airway  Mallampati: I  TM Distance: > 6 cm  Neck ROM: normal range of motion   Mouth opening: Normal     Cardiovascular    Rhythm: regular  Rate: normal         Dental    Dentition: Full lower dentures and Full upper dentures     Pulmonary  Breath sounds clear to auscultation               Abdominal  GI exam deferred       Other Findings            Anesthetic Plan    ASA: 3  Anesthesia type: general and total IV anesthesia          Induction: Intravenous  Anesthetic plan and risks discussed with: Patient

## 2020-01-31 NOTE — PERIOP NOTES
Brendan Palmer  1940  [id number]    Situation:  Verbal report given from: MELISSA Aquino and BILL Welch CRNA  Procedure: Procedure(s):  EGD WITH DILATION  ESOPHAGEAL DILATION    Background:    Preoperative diagnosis: ESOPHAGEAL DYSPHAGIA    Postoperative diagnosis: BILE IN THE STOMACH, ESOPHAGEAL STRICTURE    :  Dr. Davey Mitchell    Assistant(s): Circ-1: Pastora Mosley RN  Scrub Tech-1: Juan Antonio Kelley    Specimens: * No specimens in log *    Assessment:  Intra-procedure medications   Propofol 80 mg      Anesthesia gave intra-procedure sedation and medications, see anesthesia flow sheet     Intravenous fluids: LR@ KVO     Vital signs stable     Abdominal assessment: round and soft       Recommendation:    Permission to share finding with wife    All side rails up, bed in low position, wheels locked. Nurse at bedside.

## 2020-01-31 NOTE — PERIOP NOTES
Permission received to review discharge instructions and discuss private health information with wife  Patient states that wife will be with them for at least 24 hours following today's procedure. Tangela Jung

## 2020-01-31 NOTE — H&P
Pre-endoscopy H and P    The patient was seen and examined in the endoscopy suite. The airway was assessed and docuemented. The problem list, past medical history, and medications were reviewed.      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    Vitamin D deficiency E55.9    Hyperkalemia E87.5    Stage 3 chronic kidney disease (HCC) N18.3     Social History     Socioeconomic History    Marital status:      Spouse name: Not on file    Number of children: Not on file    Years of education: Not on file    Highest education level: Not on file   Occupational History    Not on file   Social Needs    Financial resource strain: Not on file    Food insecurity:     Worry: Not on file     Inability: Not on file    Transportation needs:     Medical: Not on file     Non-medical: Not on file   Tobacco Use    Smoking status: Former Smoker     Packs/day: 2.00     Years: 25.00     Pack years: 50.00     Last attempt to quit: 1987     Years since quittin.1    Smokeless tobacco: Never Used   Substance and Sexual Activity    Alcohol use: No    Drug use: No    Sexual activity: Not on file   Lifestyle    Physical activity:     Days per week: Not on file     Minutes per session: Not on file    Stress: Not on file   Relationships    Social connections:     Talks on phone: Not on file     Gets together: Not on file     Attends Buddhism service: Not on file     Active member of club or organization: Not on file     Attends meetings of clubs or organizations: Not on file     Relationship status: Not on file    Intimate partner violence:     Fear of current or ex partner: Not on file     Emotionally abused: Not on file     Physically abused: Not on file     Forced sexual activity: Not on file   Other Topics Concern    Not on file   Social History Narrative    Not on file     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 1/16/2018    Diet-controlled diabetes mellitus ([facility]     Diverticulosis     Drug-induced polyneuropathy ([facility] 1/16/2018    Due to chemotherapy    Esophageal cancer ([facility] 2008    tx esophagectomy, chemo, radiation (complete 2014)    GERD (gastroesophageal reflux disease)     GERD with stricture 1/16/2018    Status post dilatation    Hx of colonic polyps     Hyperkalemia 4/29/2019    Hypertension     Kidney cyst     CHAVEZ (nonalcoholic steatohepatitis) 2000    \"fatty liver\" diet related    Personal history of colonic polyps 12/6/2012    Stage 3 chronic kidney disease ([facility] 4/29/2019    Thrombocytopenia (HCC)     r/t chemo    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 Taking? CHOLESTYRAMINE/ASPARTAME (PREVALITE PO) 1/30/2020 at Unknown time  Yes Yes   Sig: Take 5.5 g by mouth every evening. CYANOCOBALAMIN (VITAMIN B-12 PO) 1/30/2020 at Unknown time  Yes Yes   Sig: Take 1,000 mg by mouth daily. MULTIVITAMINS (MULTIVITAMIN PO) 1/30/2020 at Unknown time  Yes Yes   Sig: Take  by mouth daily. VITAMINS A AND D PO 1/30/2020 at Unknown time  Yes Yes   Sig: Take 1 Cap by mouth daily. cholecalciferol, vitamin D3, (VITAMIN D3) 50 mcg (2,000 unit) tab 1/30/2020 at Unknown time  Yes Yes   Sig: Take 1 Tab by mouth daily.    esomeprazole (NEXIUM) 40 mg capsule 1/30/2020 at Unknown time  No Yes   Sig: TAKE 1 CAPSULE DAILY   folic acid-vit R7-ZIN M12 (FOLGARD RX) 2.2-25-1 mg tab 1/30/2020 at Unknown time  Yes Yes   Sig: Take 1 Tab by mouth daily. hydroCHLOROthiazide (HYDRODIURIL) 12.5 mg tablet 1/29/2020  No No   Sig: TAKE 1 TABLET EVERY OTHER DAY   Patient taking differently: every other day. lisinopril (PRINIVIL, ZESTRIL) 20 mg tablet 1/30/2020 at Unknown time  Yes Yes   Sig: Take 20 mg by mouth daily. pyridoxine (VITAMIN B-6) 100 mg tablet 1/30/2020 at Unknown time  Yes Yes   Sig: Take 100 mg by mouth daily. sildenafil citrate (VIAGRA) 50 mg tablet   Yes Yes   Sig: Take 50 mg by mouth as needed. Facility-Administered Medications: None           The review of systems is:  negative for shortness of breath or chest pain      The heart, lungs, and mental status were satisfactory for the administration of anesthesia sedation and for the procedure. I discussed with the patient the objectives, risks, consequences and alternatives to the procedure.       Ezio Ricardo MD  1/31/2020  7:04 AM

## 2020-01-31 NOTE — DISCHARGE INSTRUCTIONS
Janel Barrett  [id number]  1940              Procedure  Discharge Instructions:      Discomfort:  Redness at IV site- apply warm compress to area; if redness or soreness persist- contact your physician  There may be a slight amount of blood passed from the rectum  Gaseous discomfort- walking, belching will help relieve any discomfort  You may not operate a vehicle for 12 hours  You may not engage in an occupation involving machinery or appliances for rest of today  You may not drink alcoholic beverages for at least 12 hours  Avoid making any critical decisions for at least 24 hour  DIET:   You may resume your normal diet today. You should not overeat or \"feast\" today as your abdomen may become distended or uncomfortable. MEDICATIONS:   I reconciled this list from the list you gave us when you came today for the procedure. Please clarify with me, your primary care physician and the nurse who is discharging you if we have any discrepancies. Aspirin and or non-steroidal medication (Ibuprofen, Motrin, naproxen, etc.) is ok in limited quantities. ACTIVITY:  You may resume your normal daily activities it is recommended that you spend the remainder of the day resting -  avoid any strenuous activity. CALL M.D. ANY SIGN OF:  Increasing pain, nausea, vomiting  Abdominal distension (swelling)  New increased bleeding (oral or rectal)  Fever (chills)  Pain in chest area  Bloody discharge from nose or mouth  Shortness of breath          Follow-up Instructions:   No biopsies obtained. Telephone #  [phone]  Follow up visit as needed. Please call if you develop recurrent problems swallowing; larger dilations can be attempted. Julianna Callejas MD  7:51 AM  1/31/2020         DO NOT TAKE SLEEPING MEDICATIONS OR ANTIANXIETY MEDICATIONS WHILE TAKING NARCOTIC PAIN MEDICATIONS,  ESPECIALLY THE NIGHT OF ANESTHESIA. CPAP PATIENTS BE SURE TO WEAR MACHINE WHENEVER NAPPING OR SLEEPING.     DISCHARGE SUMMARY from Nurse    The following personal items collected during your admission are returned to you:   Dental Appliance: Dental Appliances: Uppers, Lowers  Vision: Visual Aid: Glasses  Hearing Aid:    Jewelry:    Clothing:    Other Valuables:    Valuables sent to safe:        PATIENT INSTRUCTIONS:    Anesthesia Discharge Instructions for Procedural Area requiring Sedation (MAC Anesthesia, Cath Lab, Endo and Radiology): You have been given medications during your procedure that may affect your memory and mental judgement for the next 24 hours. During this time frame for your safety, please follow the instructions listed below :    Have a responsible adult to drive you home and be with you for at least 12 hours.  Rest today and resume normal activities tomorrow.  Start with a soft bland diet and advance as tolerated to your recommended diet.  Do not drive any motor vehicle or operate mechanical or electrical equipment prior to [employer].  Avoid making critical decisions or signing legal documents prior to 6am tomorrow.  Do not drink alcohol prior to 6am tomorrow.  If you have sleep apnea and you plan to go home and take a nap, please use your CPAP machine not only at bedtime, but also while napping for 24 hours.  If you notice any redness or swelling on parts of your body where IV medications were given, place a warm wet washcloth over the area for 20 minutes at a time until the redness or swelling goes away. If you still have redness or swelling after 2-3 days, please call us. · You will receive a Post Operative Call from one of the Recovery Room Nurses on the day after your surgery to check on you. It is very important for us to know how you are recovering after your surgery. If you have an issue or need to speak with someone, please call your surgeon, do not wait for the post operative call.     · You may receive an e-mail or letter in the mail from [employer] regarding your experience with us in the Ambulatory Surgery Unit. Your feedback is valuable to us and we appreciate your participation in the survey. · If the above instructions are not adequate, please contact KRYSTINA Blake, RN Perianesthesia Manager at [phone]. If you are having problems after your surgery, call the physician at his office number. · We wish you a speedy recovery ? What to do at Home:      *  Please give a list of your current medications to your Primary Care Provider. *  Please update this list whenever your medications are discontinued, doses are      changed, or new medications (including over-the-counter products) are added. *  Please carry medication information at all times in case of emergency situations. If you have not received your influenza and/or pneumococcal vaccine, please follow up with your primary care physician. The discharge information has been reviewed with the patient and caregiver. The patient and caregiver verbalized understanding.

## 2020-01-31 NOTE — PROCEDURES
Esophagogastroduodenoscopy    Indications:  Dysphagia  Known stricture at anastomosis    Medications:  See anesthesia form    Assistants:  Rick Ng      Post procedure diagnosis:  BILE IN THE STOMACH, ESOPHAGEAL STRICTURE    Description of Procedure:    Prior to the procedure its objectives, risks, consequences and alternatives were discussed with the patient who then elected to proceed. The Olympus video endoscope was inserted under direct vision into the mouth and then into the esophagus. The esophagus looked normal.    The z-line was located at 20 cm. There was an anastomotic stricture at the z line that admitted the scope. There was bile in the stomach. The stomach conformation has been altered by prior surgery. There was bile in the stomach. There were no diagnostic abnormalities of the body, fundus, antrum, cardia and incisura of the stomach. This included direct and retroflexion examination. The first and second portion of the duodenum appeared normal.    I then dilated the distal esophagus/ anastomosis with a through the scope balloon. I dilated from 12mm to 13.5mm to 15 mm. Each time the balloon was inflated for sixty seconds. There were no apparent complications. Complications: There were no apparent complications and the patient tolerated the procedure well.         Implants:  none    Estimated Blood Loss:  none  Specimens Removed:  none  Impressions:  Successful dilation of stricture at anastomosis  Bile in the stomach  Post op exam      Signed By: Deanna Robb MD                        January 31, 2020     7:48 AM

## 2020-01-31 NOTE — PERIOP NOTES
2713: Patient received to PACU, VSS. Patient anesthetized. 65: Patient's wife at bedside. Dr. Reyes Colin at bedside. Patient awake and alert with no complaints. 1296: Discharge instructions given. Patient and wife verbalize understanding of instructions and follow up appointment. Patient and wife state ready for discharge. IV removed. Patient discharged at this time by wheelchair with belongings, wife to provide transportation home.

## 2020-01-31 NOTE — ANESTHESIA POSTPROCEDURE EVALUATION
Procedure(s):  EGD WITH DILATION  ESOPHAGEAL DILATION.     general, total IV anesthesia    Anesthesia Post Evaluation      Multimodal analgesia: multimodal analgesia not used between 6 hours prior to anesthesia start to PACU discharge  Patient location during evaluation: PACU  Patient participation: complete - patient participated  Level of consciousness: awake and alert  Pain score: 0  Airway patency: patent  Anesthetic complications: no  Cardiovascular status: acceptable  Respiratory status: acceptable  Hydration status: acceptable  Post anesthesia nausea and vomiting:  none      Vitals Value Taken Time   /67 1/31/2020  8:00 AM   Temp 36.4 °C (97.5 °F) 1/31/2020  7:44 AM   Pulse 72 1/31/2020  8:00 AM   Resp 17 1/31/2020  8:00 AM   SpO2 97 % 1/31/2020  8:00 AM

## 2020-02-13 ENCOUNTER — TELEPHONE (OUTPATIENT)
Dept: INTERNAL MEDICINE CLINIC | Age: 80
End: 2020-02-13

## 2020-02-13 NOTE — TELEPHONE ENCOUNTER
Cece Christianson NP from [facility] called- she went out today to do a home physical and she waned to report his abnormal PAD test. R leg 0.61 and L leg 0.78. He has an appointment with you on 2-18-20.

## 2020-02-18 ENCOUNTER — OFFICE VISIT (OUTPATIENT)
Dept: INTERNAL MEDICINE CLINIC | Age: 80
End: 2020-02-18

## 2020-02-18 VITALS
DIASTOLIC BLOOD PRESSURE: 80 MMHG | WEIGHT: 173.2 LBS | TEMPERATURE: 97.6 F | OXYGEN SATURATION: 98 % | SYSTOLIC BLOOD PRESSURE: 122 MMHG | HEIGHT: 68 IN | RESPIRATION RATE: 16 BRPM | BODY MASS INDEX: 26.25 KG/M2 | HEART RATE: 69 BPM

## 2020-02-18 DIAGNOSIS — E11.9 TYPE 2 DIABETES MELLITUS WITHOUT COMPLICATION, WITHOUT LONG-TERM CURRENT USE OF INSULIN (HCC): Chronic | ICD-10-CM

## 2020-02-18 DIAGNOSIS — Z85.01 HISTORY OF ESOPHAGEAL CANCER: Chronic | ICD-10-CM

## 2020-02-18 DIAGNOSIS — G62.0 DRUG-INDUCED POLYNEUROPATHY (HCC): Chronic | ICD-10-CM

## 2020-02-18 DIAGNOSIS — E55.9 VITAMIN D DEFICIENCY: Chronic | ICD-10-CM

## 2020-02-18 DIAGNOSIS — I10 ESSENTIAL HYPERTENSION: Chronic | ICD-10-CM

## 2020-02-18 DIAGNOSIS — Z00.00 MEDICARE ANNUAL WELLNESS VISIT, SUBSEQUENT: Primary | ICD-10-CM

## 2020-02-18 DIAGNOSIS — N40.0 BPH WITHOUT OBSTRUCTION/LOWER URINARY TRACT SYMPTOMS: Chronic | ICD-10-CM

## 2020-02-18 NOTE — PROGRESS NOTES
Chief Complaint   Patient presents with    Follow Up Chronic Condition     6 mo fu    Annual Wellness Visit       Depression Risk Factor Screening:     3 most recent PHQ Screens 2/18/2020   Little interest or pleasure in doing things Not at all   Feeling down, depressed, irritable, or hopeless Not at all   Total Score PHQ 2 0       Functional Ability and Level of Safety:     Activities of Daily Living  ADL Assessment 2/18/2020   Feeding yourself No Help Needed   Getting from bed to chair No Help Needed   Getting dressed No Help Needed   Bathing or showering No Help Needed   Walk across the room (includes cane/walker) No Help Needed   Using the telphone No Help Needed   Taking your medications No Help Needed   Preparing meals No Help Needed   Managing money (expenses/bills) No Help Needed   Moderately strenuous housework (laundry) No Help Needed   Shopping for personal items (toiletries/medicines) No Help Needed   Shopping for groceries No Help Needed   Driving No Help Needed   Climbing a flight of stairs No Help Needed   Getting to places beyond walking distances No Help Needed       Fall Risk  Fall Risk Assessment, last 12 mths 2/18/2020   Able to walk? Yes   Fall in past 12 months? No       Abuse Screen  Abuse Screening Questionnaire 2/18/2020   Do you ever feel afraid of your partner? N   Are you in a relationship with someone who physically or mentally threatens you? N   Is it safe for you to go home?  Y         Patient Care Team   Patient Care Team:  Mack Noriega MD as PCP - General (Internal Medicine)  Mack Noriega MD as PCP - [facility] Empaneled Provider  Cris Hsu MD (Gastroenterology)

## 2020-02-18 NOTE — PROGRESS NOTES
This note will not be viewable in [address] Alisia Baumgarten is a [de-identified] y.o. male and presents with Follow Up Chronic Condition (6 mo fu) and Annual Wellness Visit  . Subjective:  Mr. Elizabeth Dsouza presents to the office today for Medicare wellness check and follow-up of multiple medical problems. The patient has hypertension and remains on hydrochlorothiazide and lisinopril. He tolerates this regimen without cough, rash, muscle cramping or lower extremity edema. There is been no orthostatic dizziness. He denies any headaches, numbness, tingling or focal neurological problems. He has a history of type 2 diabetes mellitus that is now controlled with weight loss. The patient follows a prudent diet and continues to work exercise on a daily basis. He did have 1 of his insurances nurses come to his home recently and an A1c was checked and was 5.2. His last A1c was in a similar check 6 months ago. He denies polyuria, polydipsia or blurred vision. He has a history of esophageal cancer and GERD with history of recurrent stricture. He is chronically on PPI therapy. He did recently have dilatation of a recurrent stricture done by Dr. Lucero Garcia. He has had no weight loss or changes in bowel habits. The patient has BPH without LUTS. He is followed by Dr. Kori Smith at the urology center and is having PSAs done there as well. He denies any urinary flow problems today. 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 Patient states that this has improved somewhat over time but he is not really bothered by it. The patient has vitamin D deficiency and remains on supplementation for this with last level around 36 done 6 months ago. Recent screening test done by his insurance company showed ankle-brachial arm indexes of 0.61 on the right and 0.78 on the left.   The patient states that he walks up to 10,000 steps per day and never has any claudication. 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 1/16/2018    Diet-controlled diabetes mellitus ([facility]     Diverticulosis     Drug-induced polyneuropathy ([facility] 1/16/2018    Due to chemotherapy    Esophageal cancer ([facility] 2008    tx esophagectomy, chemo, radiation (complete 2014)    GERD (gastroesophageal reflux disease)     GERD with stricture 1/16/2018    Status post dilatation    Hx of colonic polyps     Hyperkalemia 4/29/2019    Hypertension     Kidney cyst     CHAVEZ (nonalcoholic steatohepatitis) 2000    \"fatty liver\" diet related    Personal history of colonic polyps 12/6/2012    Stage 3 chronic kidney disease ([facility] [facility] 4/29/2019    Thrombocytopenia (HCC)     r/t chemo    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    Vitamin D deficiency 1/16/2018     Past Surgical History:   Procedure Laterality Date    COLONOSCOPY N/A 6/23/2016    COLONOSCOPY performed by Austin Sanchez [facility] at [facility] ENDOSCOPY    HX CATARACT REMOVAL Bilateral     HX CHOLECYSTECTOMY  9/29/11    HX ESOPHAGECTOMY  2008    HX HEMORRHOIDECTOMY      HX HERNIA REPAIR      HX ROTATOR CUFF REPAIR Right 2014    HX VASCULAR ACCESS Right 2008    port placed and removed in 2014    IL COLSC FLX W/RMVL OF TUMOR POLYP LESION SNARE TQ  7/1/2010         [place] EGD BALLOON DILATION ESOPHAGUS <30 MM DIAM  3/3/2011         IL EGD BALLOON DILATION ESOPHAGUS <30 MM DIAM  5/22/2012         [place] EGD TRANSORAL BIOPSY SINGLE/MULTIPLE  3/29/2012         UPPER GI ENDOSCOPY,BALL DIL,30MM  1/11/2018         UPPER GI ENDOSCOPY,BALL DIL,30MM  1/31/2020          No Known Allergies  Current Outpatient Medications   Medication Sig Dispense Refill    cholecalciferol, vitamin D3, (VITAMIN D3) 50 mcg (2,000 unit) tab Take 1 Tab by mouth daily.  lisinopril (PRINIVIL, ZESTRIL) 20 mg tablet Take 20 mg by mouth daily.       hydroCHLOROthiazide (HYDRODIURIL) 12.5 mg tablet TAKE 1 TABLET EVERY OTHER DAY (Patient taking differently: every other day.) 45 Tab 3    esomeprazole (NEXIUM) 40 mg capsule TAKE 1 CAPSULE DAILY 90 Cap 1    folic acid-vit B8-ELG U43 (FOLGARD RX) 2.2-25-1 mg tab Take 1 Tab by mouth daily.  VITAMINS A AND D PO Take 1 Cap by mouth daily.  sildenafil citrate (VIAGRA) 50 mg tablet Take 50 mg by mouth as needed.  CHOLESTYRAMINE/ASPARTAME (PREVALITE PO) Take 5.5 g by mouth every evening.  CYANOCOBALAMIN (VITAMIN B-12 PO) Take 1,000 mg by mouth daily.  pyridoxine (VITAMIN B-6) 100 mg tablet Take 100 mg by mouth daily.  MULTIVITAMINS (MULTIVITAMIN PO) Take  by mouth daily.        Social History     Socioeconomic History    Marital status:      Spouse name: Not on file    Number of children: Not on file    Years of education: Not on file    Highest education level: Not on file   Tobacco Use    Smoking status: Former Smoker     Packs/day: 2.00     Years: 25.00     Pack years: 50.00     Last attempt to quit: 1987     Years since quittin.1    Smokeless tobacco: Never Used   Substance and Sexual Activity    Alcohol use: No    Drug use: No     Family History   Problem Relation Age of Onset    Diabetes Mother     Arthritis-osteo Father     Cancer Sister         lung    Cancer Sister         breast       Health Maintenance   Topic Date Due    Medicare Yearly Exam  2020    A1C test (Diabetic or Prediabetic)  2020 (Originally 1950)    Foot Exam Q1  2020    MICROALBUMIN Q1  2020    Lipid Screen  2020    GLAUCOMA SCREENING Q2Y  2021    Eye Exam Retinal or Dilated  2021    DTaP/Tdap/Td series (2 - Td) 2029    Shingrix Vaccine Age 50>  Completed    Influenza Age 5 to Adult  Completed    Pneumococcal 65+ years  Completed        Review of Systems  Constitutional: negative for fevers, chills, anorexia and weight loss  Eyes:   negative for visual disturbance and irritation  ENT:   negative for tinnitus,sore throat,nasal congestion,ear pain,hoarseness  Respiratory:  negative for cough, hemoptysis, dyspnea,wheezing  CV:   negative for chest pain, palpitations, lower extremity edema  GI:   negative for nausea, vomiting, diarrhea, abdominal pain,melena  Endo:               negative for polyuria,polydipsia,polyphagia,heat intolerance  Genitourinary: negative for frequency, dysuria and hematuria  Integumentary: negative for rash and pruritus  Hematologic:  negative for easy bruising and gum/nose bleeding  Musculoskel: negative for myalgias, arthralgias, back pain, muscle weakness, joint pain  Neurological:  negative for headaches, dizziness, vertigo, memory problems and gait   Behavl/Psych: negative for feelings of anxiety, depression, mood changes  ROS otherwise negative      Objective:  Visit Vitals  /80 (BP 1 Location: Right arm, BP Patient Position: Sitting)   Pulse 69   Temp 97.6 °F (36.4 °C) (Oral)   Resp 16   Ht 5' 8\" (1.727 m)   Wt 173 lb 3.2 oz (78.6 kg)   SpO2 98%   BMI 26.33 kg/m²     Body mass index is 26.33 kg/m². Physical Exam:   General appearance - alert, well appearing, and in no distress  Mental status - alert, oriented to person, place, and time  EYE-LUÍS, EOMI,conjunctiva normal bilaterally, lids normal  ENT-ENT exam normal, no neck nodes or sinus tenderness  Nose - normal and patent, no erythema,  Or discharge   Mouth - mucous membranes moist, pharynx normal without lesions  Neck - supple, no significant adenopathy or bruit  Chest - clear to auscultation, no wheezes, rales or rhonchi. Heart - normal rate, regular rhythm, normal S1, S2, no murmurs, rubs, clicks or gallops   Abdomen - soft, nontender, nondistended, no masses or organomegaly  Lymph- no adenopathy palpable  Ext-peripheral pulses normal, no pedal edema, no clubbing or cyanosis  Skin-Warm and dry.  no hyperpigmentation, vitiligo, or suspicious lesions  Neuro -alert, oriented, normal speech, no focal findings or movement disorder noted    In addition this patient is seen for AWV  as detailed below: This is a Subsequent Medicare Annual Wellness Exam (AWV) (Performed 12 months after IPPE or effective date of Medicare Part B enrollment)    I have reviewed the patient's medical history in detail and updated the computerized patient record. Problem list reviewed with patient and risk factors discussed. PSH, SH, FH, Medications and HM issues also reviewed and discussed. Depression screen, fall risk assessment, functional abilities and ACP also reviewed and discussed as above and below. Depression Risk Factor Screening:     3 most recent PHQ Screens 2/18/2020   Little interest or pleasure in doing things Not at all   Feeling down, depressed, irritable, or hopeless Not at all   Total Score PHQ 2 0     Alcohol Risk Factor Screening: You do not drink alcohol or very rarely. Functional Ability and Level of Safety:   Hearing Loss  Hearing is good. Activities of Daily Living  The home contains: handrails and grab bars  Patient does total self care    Fall Risk  Fall Risk Assessment, last 12 mths 2/18/2020   Able to walk? Yes   Fall in past 12 months? No       Abuse Screen  Patient is not abused    Cognitive Screening   Evaluation of Cognitive Function:  Has your family/caregiver stated any concerns about your memory: no  Normal    Patient Care Team   Patient Care Team:  Lisy Fofana MD as PCP - General (Internal Medicine)  Lisy Fofana MD as PCP - [facility] Empaneled Provider  Ivonne Hernandez MD (Gastroenterology)    Assessment/Plan   Education and counseling provided:  Are appropriate based on today's review and evaluation    Assessment/Plan:   Impressions:      ICD-10-CM ICD-9-CM    1. Medicare annual wellness visit, subsequent Z00.00 V70.0    2. Essential hypertension I10 401.9    3.  Type 2 diabetes mellitus without complication, without long-term current use of insulin (HCC) E11.9 250.00    4. Drug-induced polyneuropathy (HCC) G62.0 357.6      E980.5    5. BPH without obstruction/lower urinary tract symptoms N40.0 600.00    6. Vitamin D deficiency E55.9 268.9    7. History of esophageal cancer Z85.01 V10.03         Plan:  1. Continue present meds  2. Lifestyle modifications including Na restriction, low carb/fat diet, weight reduction and exercise (at least a walking program). Follow-up and Dispositions    · Return in about 6 months (around 8/18/2020). No orders of the defined types were placed in this encounter. Iglesia Valles MD   Assessment/Plan:  Diagnoses and all orders for this visit:    Medicare annual wellness visit, subsequent    Essential hypertension    Type 2 diabetes mellitus without complication, without long-term current use of insulin ([facility]    Drug-induced polyneuropathy ([facility] [address] [facility]    BPH without obstruction/lower urinary tract symptoms    Vitamin D deficiency    History of esophageal cancer        Health Maintenance Due   Topic Date Due    Medicare Yearly Exam  02/06/2020       Other instructions: The patient's medications were reviewed and reconciled. No change in his current medical regimen will be made. Body mass index is 26.3 and dietary counseling along with printed patient education is given    Health maintenance issues were reviewed and all are up. Recent studies by his insurance company are reviewed and will be entered into the electronic record. Labs from 8/13 were reviewed with the patient today. Follow-up 6 months    Follow-up and Dispositions    · Return in about 6 months (around 8/18/2020). I have reviewed with the patient details of the assessment and plan and all questions were answered. Relevent patient education was performed. The most recent lab findings were reviewed with the patient. An After Visit Summary was printed and given to the patient.     Iglesia Valles MD    Please note that this dictation was completed with [employer], the computer voice recognition software. Quite often unanticipated grammatical, syntax, homophones, and other interpretive errors are inadvertently transcribed by the computer software. Please disregard these errors. Please excuse any errors that have escaped final proofreading.

## 2020-02-18 NOTE — PATIENT INSTRUCTIONS
The best way to stay healthy is to live a healthy lifestyle. A healthy lifestyle includes regular exercise, eating a well-balanced diet, keeping a healthy weight and not smoking. Regular physical exams and screening tests are another important way to take care of yourself. Preventive exams provided by health care providers can find health problems early when treatment works best and can keep you from getting certain diseases or illnesses. Preventive services include exams, lab tests, screenings, shots, monitoring and information to help you take care of your own health. All people over 65 should have a pneumonia shot. Pneumonia shots are usually only needed once in a lifetime unless your doctor decides differently. In addition to your physical exam, some screening tests are recommended: 
 
All people over 65 should have a yearly flu shot. People over 65 are at medium to high risk for Hepatitis B. Three shots are needed for complete protection. Bone mass measurement (dexa scan) is recommended every two years. Diabetes Mellitus screening is recommended every year. Glaucoma is an eye disease caused by high pressure in the eye. An eye exam is recommended every year. Cardiovascular screening tests that check your cholesterol and other blood fat (lipid) levels are recommended every five years. Colorectal Cancer screening tests help to find pre-cancerous polyps (growths in the colon) so they can be removed before they turn into cancer. Tests ordered for screening depend on your personal and family history risk factors. Prostate Cancer Screening (annually up to age 76) Screening for breast cancer is recommended yearly with a Mammogram. 
 
Screening for cervical and vaginal cancer is recommended with a pelvic and Pap test every two years.  However if you have had an abnormal pap in the past  three years or at high risk for cervical or vaginal cancer Medicare will cover a pap test and a pelvic exam every year. Here is a list of your current Health Maintenance items with a due date: 
Health Maintenance Due Topic Date Due  
 Hemoglobin A1C    01/02/1950 Amira Shaw Annual Well Visit  02/06/2020 Learning About Cutting Calories How do calories affect your weight? Food gives your body energy. Energy from the food you eat is measured in calories. This energy keeps your heart beating, your brain active, and your muscles working. Your body needs a certain number of calories each day. After your body uses the calories it needs, it stores extra calories as fat. To lose weight safely, you have to eat fewer calories while eating in a healthy way. How many calories do you need each day? The more active you are, the more calories you need. When you are less active, you need fewer calories. How many calories you need each day also depends on several things, including your age and whether you are male or female. Here are some general guidelines for adults: 
· Less active women and older adults need 1,600 to 2,000 calories each day. · Active women and less active men need 2,000 to 2,400 calories each day. · Active men need 2,400 to 3,000 calories each day. How can you cut calories and eat healthy meals? Whole grains, vegetables and fruits, and dried beans are good lower-calorie foods. They give you lots of nutrients and fiber. And they fill you up. Sweets, energy drinks, and soda pop are high in calories. They give you few nutrients and no fiber. Try to limit soda pop, fruit juice, and energy drinks. Drink water instead. Some fats can be part of a healthy diet. But cutting back on fats from highly processed foods like fast foods and many snack foods is a good way to lower the calories in your diet. Also, use smaller amounts of fats like butter, margarine, salad dressing, and mayonnaise.  Add fresh garlic, lemon, or herbs to your meals to add flavor without adding fat. Meats and dairy products can be a big source of hidden fats. Try to choose lean or low-fat versions of these products. Fat-free cookies, candies, chips, and frozen treats can still be high in sugar and calories. Some fat-free foods have more calories than regular ones. Eat fat-free treats in moderation, as you would other foods. If your favorite foods are high in fat, salt, sugar, or calories, limit how often you eat them. Eat smaller servings, or look for healthy substitutes. Fill up on fruits, vegetables, and whole grains. Eating at home · Use meat as a side dish instead of as the main part of your meal. 
· Try main dishes that use whole wheat pasta, brown rice, dried beans, or vegetables. · Find ways to cook with little or no fat, such as broiling, steaming, or grilling. · Use cooking spray instead of oil. If you use oil, use a monounsaturated oil, such as canola or olive oil. · Trim fat from meats before you cook them. · Drain off fat after you brown the meat or while you roast it. · Chill soups and stews after you cook them. Then skim the fat off the top after it hardens. Eating out · Order foods that are broiled or poached rather than fried or breaded. · Cut back on the amount of butter or margarine that you use on bread. · Order sauces, gravies, and salad dressings on the side, and use only a little. · When you order pasta, choose tomato sauce rather than cream sauce. · Ask for salsa with your baked potato instead of sour cream, butter, cheese, or pace. · Order meals in a small size instead of upgrading to a large. · Share an entree, or take part of your food home to eat as another meal. 
· Share appetizers and desserts. Where can you learn more? Go to http://marianela.info/. Enter 99 [id number] in the search box to learn more about \"Learning About Cutting Calories. \" Current as of: November 7, 2018 Content Version: 12.2 © 8927-7811 [employer]. Care instructions adapted under license by [employer] (which disclaims liability or warranty for this information). If you have questions about a medical condition or this instruction, always ask your healthcare professional. [address] any warranty or liability for your use of this information.

## 2020-08-20 ENCOUNTER — OFFICE VISIT (OUTPATIENT)
Dept: INTERNAL MEDICINE CLINIC | Age: 80
End: 2020-08-20
Payer: MEDICARE

## 2020-08-20 VITALS
HEART RATE: 71 BPM | BODY MASS INDEX: 24.61 KG/M2 | DIASTOLIC BLOOD PRESSURE: 80 MMHG | TEMPERATURE: 97.7 F | HEIGHT: 68 IN | OXYGEN SATURATION: 99 % | SYSTOLIC BLOOD PRESSURE: 118 MMHG | WEIGHT: 162.4 LBS | RESPIRATION RATE: 16 BRPM

## 2020-08-20 DIAGNOSIS — N40.0 BPH WITHOUT OBSTRUCTION/LOWER URINARY TRACT SYMPTOMS: Chronic | ICD-10-CM

## 2020-08-20 DIAGNOSIS — I10 ESSENTIAL HYPERTENSION: Chronic | ICD-10-CM

## 2020-08-20 DIAGNOSIS — N18.30 STAGE 3 CHRONIC KIDNEY DISEASE (HCC): ICD-10-CM

## 2020-08-20 DIAGNOSIS — E55.9 VITAMIN D DEFICIENCY: Chronic | ICD-10-CM

## 2020-08-20 DIAGNOSIS — K22.2 GERD WITH STRICTURE: Chronic | ICD-10-CM

## 2020-08-20 DIAGNOSIS — G62.0 DRUG-INDUCED POLYNEUROPATHY (HCC): Chronic | ICD-10-CM

## 2020-08-20 DIAGNOSIS — K21.9 GERD WITH STRICTURE: Chronic | ICD-10-CM

## 2020-08-20 DIAGNOSIS — E11.9 TYPE 2 DIABETES MELLITUS WITHOUT COMPLICATION, WITHOUT LONG-TERM CURRENT USE OF INSULIN (HCC): Primary | Chronic | ICD-10-CM

## 2020-08-20 LAB
BACTERIA,BACTU: ABNORMAL
BILIRUB UR QL: NEGATIVE
CLARITY: CLEAR
COLOR UR: ABNORMAL
ERYTHROCYTE [DISTWIDTH] IN BLOOD BY AUTOMATED COUNT: 13.6 %
GLUCOSE 24H UR-MRATE: NEGATIVE G/(24.H)
HBA1C MFR BLD HPLC: 5.8 % (ref 4–5.7)
HCT VFR BLD AUTO: 45 % (ref 37–51)
HGB BLD-MCNC: 15.1 G/DL (ref 12–18)
HGB UR QL STRIP: NEGATIVE
KETONES UR QL STRIP.AUTO: ABNORMAL
LEUKOCYTE ESTERASE: NEGATIVE
LYMPHOCYTES ABSOLUTE: 2 K/UL (ref 0.6–4.1)
LYMPHOCYTES NFR BLD: 33.9 % (ref 10–58.5)
MCH RBC QN AUTO: 29.3 PG (ref 26–32)
MCHC RBC AUTO-ENTMCNC: 33.6 G/DL (ref 30–36)
MCV RBC AUTO: 87.2 FL (ref 80–97)
MICROALBUMIN, URINE: 20 MG/L (ref 0–20)
MONOCYTES ABS-DIF,2141: 0.4 K/UL (ref 0–1.8)
MONOCYTES NFR BLD: 6.8 % (ref 0.1–24)
NEUTROPHILS # BLD: 59.3 % (ref 37–92)
NEUTROPHILS ABS,2156: 3.6 K/UL (ref 2–7.8)
NITRITE UR QL STRIP.AUTO: NEGATIVE
PH UR STRIP: 5 [PH] (ref 5–7)
PLATELET # BLD AUTO: 180 K/UL (ref 140–440)
PROT UR STRIP-MCNC: ABNORMAL MG/DL
RBC # BLD AUTO: 5.16 M/UL (ref 4.2–6.3)
RBC #/AREA URNS HPF: ABNORMAL #/HPF
SP GR UR REFRACTOMETRY: 1.02 (ref 1–1.03)
UROBILINOGEN UR QL STRIP.AUTO: NEGATIVE
WBC # BLD AUTO: 6 K/UL (ref 4.1–10.9)
WBC URNS QL MICRO: ABNORMAL #/HPF

## 2020-08-20 PROCEDURE — G8754 DIAS BP LESS 90: HCPCS | Performed by: INTERNAL MEDICINE

## 2020-08-20 PROCEDURE — 1101F PT FALLS ASSESS-DOCD LE1/YR: CPT | Performed by: INTERNAL MEDICINE

## 2020-08-20 PROCEDURE — 81001 URINALYSIS AUTO W/SCOPE: CPT | Performed by: INTERNAL MEDICINE

## 2020-08-20 PROCEDURE — 82044 UR ALBUMIN SEMIQUANTITATIVE: CPT | Performed by: INTERNAL MEDICINE

## 2020-08-20 PROCEDURE — 85025 COMPLETE CBC W/AUTO DIFF WBC: CPT | Performed by: INTERNAL MEDICINE

## 2020-08-20 PROCEDURE — 82306 VITAMIN D 25 HYDROXY: CPT | Performed by: INTERNAL MEDICINE

## 2020-08-20 PROCEDURE — 83036 HEMOGLOBIN GLYCOSYLATED A1C: CPT | Performed by: INTERNAL MEDICINE

## 2020-08-20 PROCEDURE — G8432 DEP SCR NOT DOC, RNG: HCPCS | Performed by: INTERNAL MEDICINE

## 2020-08-20 PROCEDURE — 36415 COLL VENOUS BLD VENIPUNCTURE: CPT | Performed by: INTERNAL MEDICINE

## 2020-08-20 PROCEDURE — G8420 CALC BMI NORM PARAMETERS: HCPCS | Performed by: INTERNAL MEDICINE

## 2020-08-20 PROCEDURE — 84443 ASSAY THYROID STIM HORMONE: CPT | Performed by: INTERNAL MEDICINE

## 2020-08-20 PROCEDURE — G8427 DOCREV CUR MEDS BY ELIG CLIN: HCPCS | Performed by: INTERNAL MEDICINE

## 2020-08-20 PROCEDURE — G8752 SYS BP LESS 140: HCPCS | Performed by: INTERNAL MEDICINE

## 2020-08-20 PROCEDURE — G8536 NO DOC ELDER MAL SCRN: HCPCS | Performed by: INTERNAL MEDICINE

## 2020-08-20 PROCEDURE — 99214 OFFICE O/P EST MOD 30 MIN: CPT | Performed by: INTERNAL MEDICINE

## 2020-08-20 NOTE — PROGRESS NOTES
Lynda Benavides is a [de-identified] y.o. male presenting for Follow Up Chronic Condition (6 mo fu)  . 1. Have you been to the ER, urgent care clinic since your last visit? Hospitalized since your last visit? No    2. Have you seen or consulted any other health care providers outside of the [address] [place] [address] since your last visit? Include any pap smears or colon screening. Eye Dr Odette Grady, last 12 mths 2/18/2020   Able to walk? Yes   Fall in past 12 months? No         Abuse Screening Questionnaire 2/18/2020   Do you ever feel afraid of your partner? N   Are you in a relationship with someone who physically or mentally threatens you? N   Is it safe for you to go home? Y       3 most recent PHQ Screens 2/18/2020   Little interest or pleasure in doing things Not at all   Feeling down, depressed, irritable, or hopeless Not at all   Total Score PHQ 2 0       There are no discontinued medications.

## 2020-08-20 NOTE — PROGRESS NOTES
This note will not be viewable in [address] Prachi Tony is a [de-identified] y.o. male and presents with Follow Up Chronic Condition (6 mo fu)  . Subjective:  Mr. Jasmin Roman presents the office today in follow-up of multiple medical problems. The patient has type 2 diabetes mellitus currently being managed with diet. He does not check his blood sugars and his last A1c done 6 months ago with 5.4. He denies polyuria, polydipsia or blurred vision. 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 Blood pressure is currently managed on hydrochlorothiazide and lisinopril. Patient tolerates this regimen without muscle cramping, cough, orthostatic dizziness or lower extremity edema. He denies headaches, numbness, tingling or focal neurological problems. BPH has not been giving him much problems recently he is followed by Dr. Manisha Christopher at the urology center and will have a PSA done later this year. He has GERD with prior history of esophageal cancer. He remains on daily Nexium therapy. He did he has had a prior stricture in the past.  He denies dysphagia, early satiety or unexplained weight loss. He has a history of vitamin D deficiency and supplements this. He has no history of osteoporosis and denies any falls or fractures.     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 1/16/2018    Diet-controlled diabetes mellitus ([address]     Diverticulosis     Drug-induced polyneuropathy ([address] 1/16/2018    Due to chemotherapy    Esophageal cancer ([address] 2008    tx esophagectomy, chemo, radiation (complete 2014)    GERD (gastroesophageal reflux disease)     GERD with stricture 1/16/2018    Status post dilatation    Hx of colonic polyps     Hyperkalemia 4/29/2019    Hypertension     Kidney cyst     CHAVEZ (nonalcoholic steatohepatitis) 2000    \"fatty liver\" diet related    Personal history of colonic polyps 12/6/2012    Stage 3 chronic kidney disease ([facility] 4/29/2019    Thrombocytopenia (HCC)     r/t chemo    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    Vitamin D deficiency 1/16/2018     Past Surgical History:   Procedure Laterality Date    COLONOSCOPY N/A 6/23/2016    COLONOSCOPY performed [facility] ENDOSCOPY    HX CATARACT REMOVAL Bilateral     HX CHOLECYSTECTOMY  9/29/11    HX ESOPHAGECTOMY  2008    HX HEMORRHOIDECTOMY      HX HERNIA REPAIR      HX ROTATOR CUFF REPAIR Right 2014    HX VASCULAR ACCESS Right 2008    port placed and removed in 2014    OR COLSC FLX W/RMVL OF TUMOR POLYP LESION SNARE TQ  7/1/2010         OR EGD BALLOON DILATION ESOPHAGUS <30 MM DIAM  3/3/2011         OR EGD BALLOON DILATION ESOPHAGUS <30 MM DIAM  5/22/2012         OR EGD TRANSORAL BIOPSY SINGLE/MULTIPLE  3/29/2012         UPPER GI ENDOSCOPY,BALL DIL,30MM  1/11/2018         UPPER GI ENDOSCOPY,BALL DIL,30MM  1/31/2020          No Known Allergies  Current Outpatient Medications   Medication Sig Dispense Refill    cholecalciferol, vitamin D3, (VITAMIN D3) 50 mcg (2,000 unit) tab Take 1 Tab by mouth daily.  lisinopril (PRINIVIL, ZESTRIL) 20 mg tablet Take 20 mg by mouth daily.  hydroCHLOROthiazide (HYDRODIURIL) 12.5 mg tablet TAKE 1 TABLET EVERY OTHER DAY (Patient taking differently: every other day.) 45 Tab 3    esomeprazole (NEXIUM) 40 mg capsule TAKE 1 CAPSULE DAILY 90 Cap 1    folic acid-vit N4-XCO W37 (FOLGARD RX) 2.2-25-1 mg tab Take 1 Tab by mouth daily.  VITAMINS A AND D PO Take 1 Cap by mouth daily.  sildenafil citrate (VIAGRA) 50 mg tablet Take 50 mg by mouth as needed.  CHOLESTYRAMINE/ASPARTAME (PREVALITE PO) Take 5.5 g by mouth every evening.  CYANOCOBALAMIN (VITAMIN B-12 PO) Take 1,000 mg by mouth daily.  pyridoxine (VITAMIN B-6) 100 mg tablet Take 100 mg by mouth daily.       MULTIVITAMINS (MULTIVITAMIN PO) Take by mouth daily.        Social History     Socioeconomic History    Marital status:      Spouse name: Not on file    Number of children: Not on file    Years of education: Not on file    Highest education level: Not on file   Tobacco Use    Smoking status: Former Smoker     Packs/day: 2.00     Years: 25.00     Pack years: 50.00     Last attempt to quit: 1987     Years since quittin.6    Smokeless tobacco: Never Used   Substance and Sexual Activity    Alcohol use: No    Drug use: No     Family History   Problem Relation Age of Onset    Diabetes Mother     Arthritis-osteo Father    Simi Almeida Sister         lung    Cancer Sister         breast       Health Maintenance   Topic Date Due    A1C test (Diabetic or Prediabetic)  2020    Influenza Age 5 to Adult  2020    Foot Exam Q1  2020    MICROALBUMIN Q1  2020    Lipid Screen  2020    Medicare Yearly Exam  2021    GLAUCOMA SCREENING Q2Y  2021    Eye Exam Retinal or Dilated  2021    DTaP/Tdap/Td series (2 - Td) 2029    Shingrix Vaccine Age 50>  Completed    Pneumococcal 65+ years  Completed        Review of Systems  Constitutional: negative for fevers, chills, anorexia and weight loss  Eyes:   negative for visual disturbance and irritation  ENT:   negative for tinnitus,sore throat,nasal congestion,ear pain,hoarseness  Respiratory:  negative for cough, hemoptysis, dyspnea,wheezing  CV:   negative for chest pain, palpitations, lower extremity edema  GI:   negative for nausea, vomiting, diarrhea, abdominal pain,melena  Endo:               negative for polyuria,polydipsia,polyphagia,heat intolerance  Genitourinary: negative for frequency, dysuria and hematuria  Integumentary: negative for rash and pruritus  Hematologic:  negative for easy bruising and gum/nose bleeding  Musculoskel: negative for myalgias, arthralgias, back pain, muscle weakness, joint pain  Neurological:  negative for headaches, dizziness, vertigo, memory problems and gait   Behavl/Psych: negative for feelings of anxiety, depression, mood changes  ROS otherwise negative      Objective:  Visit Vitals  /80 (BP 1 Location: Right arm, BP Patient Position: Sitting)   Pulse 71   Temp 97.7 °F (36.5 °C) (Oral)   Resp 16   Ht 5' 8\" (1.727 m)   Wt 162 lb 6.4 oz (73.7 kg)   SpO2 99%   BMI 24.69 kg/m²     Body mass index is 24.69 kg/m². Physical Exam:   General appearance - alert, well appearing, and in no distress  Mental status - alert, oriented to person, place, and time  EYE-LUÍS, EOMI,conjunctiva normal bilaterally, lids normal  ENT-ENT exam normal, no neck nodes or sinus tenderness  Nose - normal and patent, no erythema,  Or discharge   Mouth - mucous membranes moist, pharynx normal without lesions  Neck - supple, no significant adenopathy or bruit  Chest - clear to auscultation, no wheezes, rales or rhonchi. Heart - normal rate, regular rhythm, normal S1, S2, no murmurs, rubs, clicks or gallops   Abdomen - soft, nontender, nondistended, no masses or organomegaly  Lymph- no adenopathy palpable  Ext-peripheral pulses normal, no pedal edema, no clubbing or cyanosis  Skin-Warm and dry.  no hyperpigmentation, vitiligo, or suspicious lesions  Neuro -alert, oriented, normal speech, no focal findings or movement disorder noted      Assessment/Plan:  Diagnoses and all orders for this visit:    Type 2 diabetes mellitus without complication, without long-term current use of insulin (HCC)  -     HEMOGLOBIN A1C W/O EAG  -     URINE, MICROALBUMIN, SEMIQUANTITATIVE    Essential hypertension  -     COLLECTION VENOUS BLOOD,VENIPUNCTURE  -     CBC WITH AUTOMATED DIFF  -     LIPID PANEL  -     TSH 3RD GENERATION  -     URINALYSIS W/MICROSCOPIC  -     METABOLIC PANEL, COMPREHENSIVE    Vitamin D deficiency  -     VITAMIN D, 25 HYDROXY    GERD with stricture    Drug-induced polyneuropathy (HCC)    BPH without obstruction/lower urinary tract symptoms    Stage 3 chronic kidney disease ([facility]        Other instructions: The patient's medications were reviewed and reconciled. No change in his current medical regimen will be made. A no added salt, prudent diet and exercise is encouraged. Await results of multiple labs    Follow-up in 6 months    Follow-up and Dispositions    · Return in about 6 months (around 2/20/2021). I have reviewed with the patient details of the assessment and plan and all questions were answered. Relevent patient education was performed. The most recent lab findings were reviewed with the patient. An After Visit Summary was printed and given to the patient. Porfirio Melendez MD    Please note that this dictation was completed with [employer], the computer voice recognition software. Quite often unanticipated grammatical, syntax, homophones, and other interpretive errors are inadvertently transcribed by the computer software. Please disregard these errors. Please excuse any errors that have escaped final proofreading.

## 2020-08-20 NOTE — PATIENT INSTRUCTIONS

## 2020-08-21 LAB
25(OH)D3 SERPL-MCNC: 40 NG/ML (ref 30–96)
ALBUMIN SERPL-MCNC: 4.5 G/DL (ref 3.7–4.7)
ALBUMIN/GLOB SERPL: 1.7 {RATIO} (ref 1.2–2.2)
ALP SERPL-CCNC: 95 IU/L (ref 39–117)
ALT SERPL-CCNC: 12 IU/L (ref 0–44)
AST SERPL-CCNC: 26 IU/L (ref 0–40)
BILIRUB SERPL-MCNC: 0.5 MG/DL (ref 0–1.2)
BUN SERPL-MCNC: 20 MG/DL (ref 8–27)
BUN/CREAT SERPL: 15 (ref 10–24)
CALCIUM SERPL-MCNC: 9.8 MG/DL (ref 8.6–10.2)
CHLORIDE SERPL-SCNC: 103 MMOL/L (ref 96–106)
CHOLEST SERPL-MCNC: 150 MG/DL (ref 100–199)
CO2 SERPL-SCNC: 22 MMOL/L (ref 20–29)
CREAT SERPL-MCNC: 1.36 MG/DL (ref 0.76–1.27)
GLOBULIN SER CALC-MCNC: 2.6 G/DL (ref 1.5–4.5)
GLUCOSE SERPL-MCNC: 87 MG/DL (ref 65–99)
HDLC SERPL-MCNC: 55 MG/DL
LDLC SERPL CALC-MCNC: 83 MG/DL (ref 0–99)
POTASSIUM SERPL-SCNC: 4.7 MMOL/L (ref 3.5–5.2)
PROT SERPL-MCNC: 7.1 G/DL (ref 6–8.5)
SODIUM SERPL-SCNC: 141 MMOL/L (ref 134–144)
TRIGL SERPL-MCNC: 60 MG/DL (ref 0–149)
TSH SERPL DL<=0.05 MIU/L-ACNC: 0.87 UIU/ML (ref 0.34–5.6)
VLDLC SERPL CALC-MCNC: 12 MG/DL (ref 5–40)

## 2021-01-05 RX ORDER — HYDROCHLOROTHIAZIDE 12.5 MG/1
TABLET ORAL
Qty: 45 TAB | Refills: 3 | Status: SHIPPED | OUTPATIENT
Start: 2021-01-05 | End: 2021-12-31

## 2021-02-23 ENCOUNTER — OFFICE VISIT (OUTPATIENT)
Dept: INTERNAL MEDICINE CLINIC | Age: 81
End: 2021-02-23
Payer: MEDICARE

## 2021-02-23 VITALS
TEMPERATURE: 97.3 F | DIASTOLIC BLOOD PRESSURE: 72 MMHG | BODY MASS INDEX: 25.28 KG/M2 | HEIGHT: 68 IN | OXYGEN SATURATION: 100 % | HEART RATE: 68 BPM | SYSTOLIC BLOOD PRESSURE: 120 MMHG | RESPIRATION RATE: 18 BRPM | WEIGHT: 166.8 LBS

## 2021-02-23 DIAGNOSIS — Z00.00 MEDICARE ANNUAL WELLNESS VISIT, SUBSEQUENT: Primary | ICD-10-CM

## 2021-02-23 DIAGNOSIS — K22.2 GERD WITH STRICTURE: Chronic | ICD-10-CM

## 2021-02-23 DIAGNOSIS — I10 ESSENTIAL HYPERTENSION: Chronic | ICD-10-CM

## 2021-02-23 DIAGNOSIS — G62.0 DRUG-INDUCED POLYNEUROPATHY (HCC): Chronic | ICD-10-CM

## 2021-02-23 DIAGNOSIS — E55.9 VITAMIN D DEFICIENCY: Chronic | ICD-10-CM

## 2021-02-23 DIAGNOSIS — K21.9 GERD WITH STRICTURE: Chronic | ICD-10-CM

## 2021-02-23 DIAGNOSIS — Z85.01 HISTORY OF ESOPHAGEAL CANCER: Chronic | ICD-10-CM

## 2021-02-23 DIAGNOSIS — E11.9 TYPE 2 DIABETES MELLITUS WITHOUT COMPLICATION, WITHOUT LONG-TERM CURRENT USE OF INSULIN (HCC): Chronic | ICD-10-CM

## 2021-02-23 LAB
25(OH)D3 SERPL-MCNC: 49 NG/ML (ref 30–96)
A-G RATIO,AGRAT: 1.5 RATIO
ALBUMIN SERPL-MCNC: 4.4 G/DL (ref 3.9–5.4)
ALP SERPL-CCNC: 95 U/L (ref 38–126)
ALT SERPL-CCNC: 15 U/L (ref 0–50)
ANION GAP SERPL CALC-SCNC: 13 MMOL/L
AST SERPL W P-5'-P-CCNC: 31 U/L (ref 14–36)
BACTERIA,BACTU: ABNORMAL
BILIRUB SERPL-MCNC: 0.6 MG/DL (ref 0.2–1.3)
BILIRUB UR QL: NEGATIVE
BUN SERPL-MCNC: 23 MG/DL (ref 9–20)
BUN/CREATININE RATIO,BUCR: 19 RATIO
CALCIUM SERPL-MCNC: 10.2 MG/DL (ref 8.4–10.2)
CHLORIDE SERPL-SCNC: 105 MMOL/L (ref 98–107)
CHOL/HDL RATIO,CHHD: 3 RATIO (ref 0–4)
CHOLEST SERPL-MCNC: 164 MG/DL (ref 0–200)
CLARITY: CLEAR
CO2 SERPL-SCNC: 25 MMOL/L (ref 22–32)
COLOR UR: ABNORMAL
CREAT SERPL-MCNC: 1.2 MG/DL (ref 0.8–1.5)
ERYTHROCYTE [DISTWIDTH] IN BLOOD BY AUTOMATED COUNT: 14.9 %
GLOBULIN,GLOB: 2.9
GLUCOSE 24H UR-MRATE: NEGATIVE G/(24.H)
GLUCOSE SERPL-MCNC: 92 MG/DL (ref 75–110)
HBA1C MFR BLD HPLC: 5.9 % (ref 4–5.7)
HCT VFR BLD AUTO: 45.9 % (ref 37–51)
HDLC SERPL-MCNC: 54 MG/DL (ref 35–130)
HGB BLD-MCNC: 15.3 G/DL (ref 12–18)
HGB UR QL STRIP: NEGATIVE
KETONES UR QL STRIP.AUTO: NEGATIVE
LDL/HDL RATIO,LDHD: 2 RATIO
LDLC SERPL CALC-MCNC: 89 MG/DL (ref 0–130)
LEUKOCYTE ESTERASE: NEGATIVE
LYMPHOCYTES ABSOLUTE: 2.7 K/UL (ref 0.6–4.1)
LYMPHOCYTES NFR BLD: 36.1 % (ref 10–58.5)
MCH RBC QN AUTO: 28.7 PG (ref 26–32)
MCHC RBC AUTO-ENTMCNC: 33.3 G/DL (ref 30–36)
MCV RBC AUTO: 85.9 FL (ref 80–97)
MICROALBUMIN, URINE: 20 MG/L (ref 0–20)
MONOCYTES ABS-DIF,2141: 0.5 K/UL (ref 0–1.8)
MONOCYTES NFR BLD: 6.7 % (ref 0.1–24)
NEUTROPHILS # BLD: 57.2 % (ref 37–92)
NEUTROPHILS ABS,2156: 4.3 K/UL (ref 2–7.8)
NITRITE UR QL STRIP.AUTO: NEGATIVE
PH UR STRIP: 5 [PH] (ref 5–7)
PLATELET # BLD AUTO: 160 K/UL (ref 140–440)
POTASSIUM SERPL-SCNC: 5 MMOL/L (ref 3.6–5)
PROT SERPL-MCNC: 7.3 G/DL (ref 6.3–8.2)
PROT UR STRIP-MCNC: NEGATIVE MG/DL
RBC # BLD AUTO: 5.34 M/UL (ref 4.2–6.3)
RBC #/AREA URNS HPF: ABNORMAL #/HPF
SODIUM SERPL-SCNC: 143 MMOL/L (ref 137–145)
SP GR UR REFRACTOMETRY: 1.01 (ref 1–1.03)
TRIGL SERPL-MCNC: 107 MG/DL (ref 0–200)
TSH SERPL DL<=0.05 MIU/L-ACNC: 1.33 UIU/ML (ref 0.34–5.6)
UROBILINOGEN UR QL STRIP.AUTO: NEGATIVE
VLDLC SERPL CALC-MCNC: 21 MG/DL
WBC # BLD AUTO: 7.5 K/UL (ref 4.1–10.9)
WBC URNS QL MICRO: 0 #/HPF

## 2021-02-23 PROCEDURE — G8510 SCR DEP NEG, NO PLAN REQD: HCPCS | Performed by: INTERNAL MEDICINE

## 2021-02-23 PROCEDURE — 85025 COMPLETE CBC W/AUTO DIFF WBC: CPT | Performed by: INTERNAL MEDICINE

## 2021-02-23 PROCEDURE — G8752 SYS BP LESS 140: HCPCS | Performed by: INTERNAL MEDICINE

## 2021-02-23 PROCEDURE — 80053 COMPREHEN METABOLIC PANEL: CPT | Performed by: INTERNAL MEDICINE

## 2021-02-23 PROCEDURE — G8419 CALC BMI OUT NRM PARAM NOF/U: HCPCS | Performed by: INTERNAL MEDICINE

## 2021-02-23 PROCEDURE — G0439 PPPS, SUBSEQ VISIT: HCPCS | Performed by: INTERNAL MEDICINE

## 2021-02-23 PROCEDURE — 99214 OFFICE O/P EST MOD 30 MIN: CPT | Performed by: INTERNAL MEDICINE

## 2021-02-23 PROCEDURE — 84443 ASSAY THYROID STIM HORMONE: CPT | Performed by: INTERNAL MEDICINE

## 2021-02-23 PROCEDURE — 82306 VITAMIN D 25 HYDROXY: CPT | Performed by: INTERNAL MEDICINE

## 2021-02-23 PROCEDURE — 83036 HEMOGLOBIN GLYCOSYLATED A1C: CPT | Performed by: INTERNAL MEDICINE

## 2021-02-23 PROCEDURE — 82044 UR ALBUMIN SEMIQUANTITATIVE: CPT | Performed by: INTERNAL MEDICINE

## 2021-02-23 PROCEDURE — G8754 DIAS BP LESS 90: HCPCS | Performed by: INTERNAL MEDICINE

## 2021-02-23 PROCEDURE — G8427 DOCREV CUR MEDS BY ELIG CLIN: HCPCS | Performed by: INTERNAL MEDICINE

## 2021-02-23 PROCEDURE — 1101F PT FALLS ASSESS-DOCD LE1/YR: CPT | Performed by: INTERNAL MEDICINE

## 2021-02-23 PROCEDURE — 80061 LIPID PANEL: CPT | Performed by: INTERNAL MEDICINE

## 2021-02-23 PROCEDURE — 81001 URINALYSIS AUTO W/SCOPE: CPT | Performed by: INTERNAL MEDICINE

## 2021-02-23 PROCEDURE — G8536 NO DOC ELDER MAL SCRN: HCPCS | Performed by: INTERNAL MEDICINE

## 2021-02-23 NOTE — PATIENT INSTRUCTIONS
The best way to stay healthy is to live a healthy lifestyle. A healthy lifestyle includes regular exercise, eating a well-balanced diet, keeping a healthy weight and not smoking. Regular physical exams and screening tests are another important way to take care of yourself. Preventive exams provided by health care providers can find health problems early when treatment works best and can keep you from getting certain diseases or illnesses. Preventive services include exams, lab tests, screenings, shots, monitoring and information to help you take care of your own health. All people over 65 should have a pneumonia shot. Pneumonia shots are usually only needed once in a lifetime unless your doctor decides differently. In addition to your physical exam, some screening tests are recommended: 
 
All people over 65 should have a yearly flu shot. People over 65 are at medium to high risk for Hepatitis B. Three shots are needed for complete protection. Bone mass measurement (dexa scan) is recommended every two years. Diabetes Mellitus screening is recommended every year. Glaucoma is an eye disease caused by high pressure in the eye. An eye exam is recommended every year. Cardiovascular screening tests that check your cholesterol and other blood fat (lipid) levels are recommended every five years. Colorectal Cancer screening tests help to find pre-cancerous polyps (growths in the colon) so they can be removed before they turn into cancer. Tests ordered for screening depend on your personal and family history risk factors. Prostate Cancer Screening (annually up to age 76) Screening for breast cancer is recommended yearly with a Mammogram. 
 
 Screening for cervical and vaginal cancer is recommended with a pelvic and Pap test every two years. However if you have had an abnormal pap in the past  three years or at high risk for cervical or vaginal cancer Medicare will cover a pap test and a pelvic exam every year. Here is a list of your current Health Maintenance items with a due date: 
Health Maintenance Due Topic Date Due  
 Diabetic Foot Care  08/13/2020  Hemoglobin A1C    02/20/2021 [facility] Annual Well Visit  02/18/2021  Glaucoma Screening   03/21/2021 Learning About Diabetes Food Guidelines Your Care Instructions Meal planning is important to manage diabetes. It helps keep your blood sugar at a target level (which you set with your doctor). You don't have to eat special foods. You can eat what your family eats, including sweets once in a while. But you do have to pay attention to how often you eat and how much you eat of certain foods. You may want to work with a dietitian or a certified diabetes educator (CDE) to help you plan meals and snacks. A dietitian or CDE can also help you lose weight if that is one of your goals. What should you know about eating carbs? Managing the amount of carbohydrate (carbs) you eat is an important part of healthy meals when you have diabetes. Carbohydrate is found in many foods. · Learn which foods have carbs. And learn the amounts of carbs in different foods. ? Bread, cereal, pasta, and rice have about 15 grams of carbs in a serving. A serving is 1 slice of bread (1 ounce), ½ cup of cooked cereal, or 1/3 cup of cooked pasta or rice. ? Fruits have 15 grams of carbs in a serving. A serving is 1 small fresh fruit, such as an apple or orange; ½ of a banana; ½ cup of cooked or canned fruit; ½ cup of fruit juice; 1 cup of melon or raspberries; or 2 tablespoons of dried fruit. ? Milk and no-sugar-added yogurt have 15 grams of carbs in a serving. A serving is 1 cup of milk or 2/3 cup of no-sugar-added yogurt. ? Starchy vegetables have 15 grams of carbs in a serving. A serving is ½ cup of mashed potatoes or sweet potato; 1 cup winter squash; ½ of a small baked potato; ½ cup of cooked beans; or ½ cup cooked corn or green peas. · Learn how much carbs to eat each day and at each meal. A dietitian or CDE can teach you how to keep track of the amount of carbs you eat. This is called carbohydrate counting. · If you are not sure how to count carbohydrate grams, use the Plate Method to plan meals. It is a good, quick way to make sure that you have a balanced meal. It also helps you spread carbs throughout the day. ? Divide your plate by types of foods. Put non-starchy vegetables on half the plate, meat or other protein food on one-quarter of the plate, and a grain or starchy vegetable in the final quarter of the plate. To this you can add a small piece of fruit and 1 cup of milk or yogurt, depending on how many carbs you are supposed to eat at a meal. 
· Try to eat about the same amount of carbs at each meal. Do not \"save up\" your daily allowance of carbs to eat at one meal. 
· Proteins have very little or no carbs per serving. Examples of proteins are beef, chicken, turkey, fish, eggs, tofu, cheese, cottage cheese, and peanut butter. A serving size of meat is 3 ounces, which is about the size of a deck of cards. Examples of meat substitute serving sizes (equal to 1 ounce of meat) are 1/4 cup of cottage cheese, 1 egg, 1 tablespoon of peanut butter, and ½ cup of tofu. How can you eat out and still eat healthy? · Learn to estimate the serving sizes of foods that have carbohydrate. If you measure food at home, it will be easier to estimate the amount in a serving of restaurant food. · If the meal you order has too much carbohydrate (such as potatoes, corn, or baked beans), ask to have a low-carbohydrate food instead. Ask for a salad or green vegetables. · If you use insulin, check your blood sugar before and after eating out to help you plan how much to eat in the future. · If you eat more carbohydrate at a meal than you had planned, take a walk or do other exercise. This will help lower your blood sugar. What else should you know? · Limit saturated fat, such as the fat from meat and dairy products. This is a healthy choice because people who have diabetes are at higher risk of heart disease. So choose lean cuts of meat and nonfat or low-fat dairy products. Use olive or canola oil instead of butter or shortening when cooking. · Don't skip meals. Your blood sugar may drop too low if you skip meals and take insulin or certain medicines for diabetes. · Check with your doctor before you drink alcohol. Alcohol can cause your blood sugar to drop too low. Alcohol can also cause a bad reaction if you take certain diabetes medicines. Follow-up care is a key part of your treatment and safety. Be sure to make and go to all appointments, and call your doctor if you are having problems. It's also a good idea to know your test results and keep a list of the medicines you take. Where can you learn more? Go to http://www.gray.com/ Enter T900 in the search box to learn more about \"Learning About Diabetes Food Guidelines. \" Current as of: December 20, 2019               Content Version: 12.6 © 1343-8010 [employer]. Care instructions adapted under license by [employer] (which disclaims liability or warranty for this information). If you have questions about a medical condition or this instruction, always ask your healthcare professional. [address] any warranty or liability for your use of this information.

## 2021-02-23 NOTE — PROGRESS NOTES
Chief Complaint   Patient presents with    Follow Up Chronic Condition     6 mo fu    Annual Wellness Visit       Depression Risk Factor Screening:     3 most recent PHQ Screens 2/23/2021   Little interest or pleasure in doing things Not at all   Feeling down, depressed, irritable, or hopeless Not at all   Total Score PHQ 2 0       Functional Ability and Level of Safety:     Activities of Daily Living  ADL Assessment 2/23/2021   Feeding yourself No Help Needed   Getting from bed to chair No Help Needed   Getting dressed No Help Needed   Bathing or showering No Help Needed   Walk across the room (includes cane/walker) No Help Needed   Using the telphone No Help Needed   Taking your medications No Help Needed   Preparing meals No Help Needed   Managing money (expenses/bills) No Help Needed   Moderately strenuous housework (laundry) No Help Needed   Shopping for personal items (toiletries/medicines) No Help Needed   Shopping for groceries No Help Needed   Driving No Help Needed   Climbing a flight of stairs No Help Needed   Getting to places beyond walking distances No Help Needed       Fall Risk  Fall Risk Assessment, last 12 mths 2/23/2021   Able to walk? Yes   Fall in past 12 months? 0   Do you feel unsteady? 0   Are you worried about falling 0       Abuse Screen  Abuse Screening Questionnaire 2/23/2021   Do you ever feel afraid of your partner? N   Are you in a relationship with someone who physically or mentally threatens you? N   Is it safe for you to go home?  Y         Patient Care Team   Patient Care Team:  Cristopher Shipley MD as PCP - General (Internal Medicine)  Cristopher Shipley MD as PCP - [facility] Empaneled Provider  Lynette Soler MD (Gastroenterology)

## 2021-02-23 NOTE — PROGRESS NOTES
Flor Sahu is a 80 y.o. male and presents with Follow Up Chronic Condition (6 mo fu) and Annual Wellness Visit  . Subjective:  Mr. Rito Torrez returns to the office today for Medicare wellness check and follow-up of multiple medical problems. The patient has type 2 diabetes mellitus and is currently managing this with diet. He does not check his blood sugars. He denies polyuria, polydipsia or blurred vision. He is up-to-date on diabetic retinal eye exam.  The patient does have a history of drug-induced polyneuropathy. This is not associated with his diabetes. He does supplement himself with vitamins especially B12. He has had no exacerbation of the symptoms. His blood pressure is managed on hydrochlorothiazide and lisinopril. He denies any fatigue, muscle cramping, cough or orthostatic dizziness. He has had no headaches, numbness, tingling or focal neurological problems. He has GERD with history of stricture and also history of esophageal cancer. He remains on Nexium therapy. The patient has had no dysphagia, early satiety or unexplained weight loss and denies any breakthrough heartburn symptoms. Patient has vitamin D deficiency and is supplementing this and is due to have this level checked today.     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 1/16/2018    Diet-controlled diabetes mellitus ([address]     Diverticulosis     Drug-induced polyneuropathy ([address] 1/16/2018    Due to chemotherapy    Esophageal cancer [facility] 2008    tx esophagectomy, chemo, radiation (complete 2014)    GERD (gastroesophageal reflux disease)     GERD with stricture 1/16/2018    Status post dilatation    Hx of colonic polyps     Hyperkalemia 4/29/2019    Hypertension     Kidney cyst     CHAVEZ (nonalcoholic steatohepatitis) 2000    \"fatty liver\" diet related    Personal history of colonic polyps 12/6/2012    Stage 3 chronic kidney disease 4/29/2019    Thrombocytopenia (HCC)     r/t chemo    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    Vitamin D deficiency 1/16/2018     Past Surgical History:   Procedure Laterality Date    COLONOSCOPY N/A 6/23/2016    COLONOSCOPY performed by Terri Townsend MD at [facility] ENDOSCOPY    HX CATARACT REMOVAL Bilateral     HX CHOLECYSTECTOMY  9/29/11    HX ESOPHAGECTOMY  2008    HX HEMORRHOIDECTOMY      HX HERNIA REPAIR      HX ROTATOR CUFF REPAIR Right 2014    HX VASCULAR ACCESS Right 2008    port placed and removed in 2014    MA COLSC FLX W/RMVL OF TUMOR POLYP LESION SNARE TQ  7/1/2010         MA EGD BALLOON DILATION ESOPHAGUS <30 MM DIAM  3/3/2011         MA EGD BALLOON DILATION ESOPHAGUS <30 MM DIAM  5/22/2012         MA EGD TRANSORAL BIOPSY SINGLE/MULTIPLE  3/29/2012         UPPER GI ENDOSCOPY,BALL DIL,30MM  1/11/2018         UPPER GI ENDOSCOPY,BALL DIL,30MM  1/31/2020          No Known Allergies  Current Outpatient Medications   Medication Sig Dispense Refill    hydroCHLOROthiazide (HYDRODIURIL) 12.5 mg tablet TAKE 1 TABLET EVERY OTHER DAY 45 Tab 3    cholecalciferol, vitamin D3, (VITAMIN D3) 50 mcg (2,000 unit) tab Take 1 Tab by mouth daily.  lisinopril (PRINIVIL, ZESTRIL) 20 mg tablet Take 20 mg by mouth daily.  esomeprazole (NEXIUM) 40 mg capsule TAKE 1 CAPSULE DAILY 90 Cap 1    folic acid-vit B3-NPC B29 (FOLGARD RX) 2.2-25-1 mg tab Take 1 Tab by mouth daily.  VITAMINS A AND D PO Take 1 Cap by mouth daily.  sildenafil citrate (VIAGRA) 50 mg tablet Take 50 mg by mouth as needed.  CHOLESTYRAMINE/ASPARTAME (PREVALITE PO) Take 5.5 g by mouth every evening.  CYANOCOBALAMIN (VITAMIN B-12 PO) Take 1,000 mg by mouth daily.  pyridoxine (VITAMIN B-6) 100 mg tablet Take 100 mg by mouth daily.  MULTIVITAMINS (MULTIVITAMIN PO) Take  by mouth daily.        Social History     Socioeconomic History    Marital status:  Spouse name: Not on file    Number of children: Not on file    Years of education: Not on file    Highest education level: Not on file   Tobacco Use    Smoking status: Former Smoker     Packs/day: 2.00     Years: 25.00     Pack years: 50.00     Quit date: 1987     Years since quittin.1    Smokeless tobacco: Never Used   Substance and Sexual Activity    Alcohol use: No    Drug use: No     Family History   Problem Relation Age of Onset    Diabetes Mother     Arthritis-osteo Father     Cancer Sister         lung    Cancer Sister         breast       Health Maintenance   Topic Date Due    Foot Exam Q1  2020    A1C test (Diabetic or Prediabetic)  2021    GLAUCOMA SCREENING Q2Y  2021    Eye Exam Retinal or Dilated  2021    MICROALBUMIN Q1  2021    Lipid Screen  2021    Medicare Yearly Exam  2022    DTaP/Tdap/Td series (2 - Td) 2029    Shingrix Vaccine Age 50>  Completed    Flu Vaccine  Completed    COVID-19 Vaccine  Completed    Pneumococcal 65+ years  Completed        Review of Systems  Constitutional: negative for fevers, chills, anorexia and weight loss  Eyes:   negative for visual disturbance and irritation  ENT:   negative for tinnitus,sore throat,nasal congestion,ear pain,hoarseness  Respiratory:  negative for cough, hemoptysis, dyspnea,wheezing  CV:   negative for chest pain, palpitations, lower extremity edema  GI:   negative for nausea, vomiting, diarrhea, abdominal pain,melena  Endo:               negative for polyuria,polydipsia,polyphagia,heat intolerance  Genitourinary: negative for frequency, dysuria and hematuria  Integumentary: negative for rash and pruritus  Hematologic:  negative for easy bruising and gum/nose bleeding  Musculoskel: negative for myalgias, arthralgias, back pain, muscle weakness, joint pain  Neurological:  negative for headaches, dizziness, vertigo, memory problems and gait   Behavl/Psych: negative for feelings of anxiety, depression, mood changes  ROS otherwise negative      Objective:  Visit Vitals  /72 (BP 1 Location: Right upper arm, BP Patient Position: Sitting, BP Cuff Size: Adult)   Pulse 68   Temp 97.3 °F (36.3 °C) (Oral)   Resp 18   Ht 5' 8\" (1.727 m)   Wt 166 lb 12.8 oz (75.7 kg)   SpO2 100%   BMI 25.36 kg/m²     Body mass index is 25.36 kg/m². Physical Exam:   General appearance - alert, well appearing, and in no distress  Mental status - alert, oriented to person, place, and time  EYE-LUÍS, EOMI,conjunctiva normal bilaterally, lids normal  ENT-ENT exam normal, no neck nodes or sinus tenderness  Nose - normal and patent, no erythema,  Or discharge   Mouth - mucous membranes moist, pharynx normal without lesions  Neck - supple, no significant adenopathy or bruit  Chest - clear to auscultation, no wheezes, rales or rhonchi. Heart - normal rate, regular rhythm, normal S1, S2, no murmurs, rubs, clicks or gallops   Abdomen - soft, nontender, nondistended, no masses or organomegaly  Lymph- no adenopathy palpable  Ext-peripheral pulses normal, no pedal edema, no clubbing or cyanosis  Skin-Warm and dry. no hyperpigmentation, vitiligo, or suspicious lesions  Neuro -alert, oriented, normal speech, no focal findings or movement disorder noted    In addition this patient is seen for AWV  as detailed below: This is a Subsequent Medicare Annual Wellness Exam (AWV) (Performed 12 months after IPPE or effective date of Medicare Part B enrollment)    I have reviewed the patient's medical history in detail and updated the computerized patient record. Problem list reviewed with patient and risk factors discussed. PSH, SH, FH, Medications and HM issues also reviewed and discussed. Depression screen, fall risk assessment, functional abilities and ACP also reviewed and discussed as above and below.     Depression Risk Factor Screening:     3 most recent PHQ Screens 2/23/2021   Little interest or pleasure in doing things Not at all   Feeling down, depressed, irritable, or hopeless Not at all   Total Score PHQ 2 0     Alcohol Risk Factor Screening: You do not drink alcohol or very rarely. Functional Ability and Level of Safety:   Hearing Loss  Hearing is good. Activities of Daily Living  The home contains: handrails and grab bars  Patient does total self care    Fall Risk  Fall Risk Assessment, last 12 mths 2/23/2021   Able to walk? Yes   Fall in past 12 months? 0   Do you feel unsteady? 0   Are you worried about falling 0       Abuse Screen  Patient is not abused    Cognitive Screening   Evaluation of Cognitive Function:  Has your family/caregiver stated any concerns about your memory: no  Normal    Patient Care Team   Patient Care Team:  Gonzalo Bravo MD as PCP - General (Internal Medicine)  Gonzalo Bravo MD as PCP - [facility] Empaneled Provider  Miryam Hartman MD (Gastroenterology)    Assessment/Plan   Education and counseling provided:  Are appropriate based on today's review and evaluation    Assessment/Plan:   Impressions:      ICD-10-CM ICD-9-CM    1. Medicare annual wellness visit, subsequent  Z00.00 V70.0    2. Type 2 diabetes mellitus without complication, without long-term current use of insulin (HCC)  E11.9 250.00    3. Essential hypertension  I10 401.9    4. History of esophageal cancer  Z85.01 V10.03    5. GERD with stricture  K21.9 530.81     K22.2 530.3    6. Drug-induced polyneuropathy (HCC)  G62.0 357.6      E980.5    7. Vitamin D deficiency  E55.9 268.9         Plan:  1. Continue present meds  2. Lifestyle modifications including Na restriction, low carb/fat diet, weight reduction and exercise (at least a walking program). Follow-up and Dispositions    · Return in about 6 months (around 8/23/2021). No orders of the defined types were placed in this encounter.       Areli Sierra MD   Assessment/Plan:  Diagnoses and all orders for this visit:    Medicare annual wellness visit, subsequent    Type 2 diabetes mellitus without complication, without long-term current use of insulin (HCC)    Essential hypertension    History of esophageal cancer    GERD with stricture    Drug-induced polyneuropathy ([address]    Vitamin D deficiency        Health Maintenance Due   Topic Date Due    Foot Exam Q1  08/13/2020    A1C test (Diabetic or Prediabetic)  02/20/2021    GLAUCOMA SCREENING Q2Y  03/21/2021       Other instructions: The patient's medications were reviewed and reconciled. No change in his current medical regimen will be made. A no added salt, prudent diet is encouraged. Health maintenance issues were reviewed and are up-to-date. Age-appropriate vaccinations were reviewed and are up-to-date. Await results of multiple labs    Follow-up in 6 months    Follow-up and Dispositions    · Return in about 6 months (around 8/23/2021). I have reviewed with the patient details of the assessment and plan and all questions were answered. Relevent patient education was performed. The most recent lab findings were reviewed with the patient. An After Visit Summary was printed and given to the patient. Darwin Carlin MD    Please note that this dictation was completed with [employer], the [employer] voice recognition software. Quite often unanticipated grammatical, syntax, homophones, and other interpretive errors are inadvertently transcribed by the computer software. Please disregard these errors. Please excuse any errors that have escaped final proofreading.

## 2021-06-24 NOTE — PERIOP NOTES
[facility]  Ambulatory Surgery Unit  Pre-operative Instructions for Endo Procedures    Procedure Date  Friday July 2, 2021            Tentative Arrival Time TBD      1. On the day of your procedure, please report to the Ambulatory Surgery Unit Registration Desk and sign in at your designated time. The Ambulatory Surgery Unit is located in [facility] on the [facility] side of the [facility] across from the [address] [place] [address]. Please have all of your health insurance cards and a photo ID, COVID vaccination card. 2. You must have someone with you to drive you home, as you should not drive a car for 24 hours following anesthesia. Please make arrangements for a responsible adult friend or family member to stay with you for at least the first 24 hours after your procedure. 3. Do not have anything to eat or drink (including water, gum, mints, coffee, juice) after 11:59 PM Thursday July 1st. This may not apply to medications prescribed by your physician. (Please note below the special instructions with medications to take the morning of your procedure.)    4. If applicable, follow the clear liquid diet and bowel prep instructions provided by your physician's office. If you do not have this information, or have any questions, please contact your physician's office. 5. We recommend you do not drink any alcoholic beverages for 24 hours before and after your procedure. 6. Contact your surgeons office for instructions on the following medications: non-steroidal anti-inflammatory drugs (i.e. Advil, Aleve), vitamins, and supplements. (Some surgeons will want you to stop these medications prior to surgery and others may allow you to take them)   **If you are currently taking Plavix, Coumadin, Aspirin and/or other blood-thinning agents, contact your surgeon for instructions. ** Your surgeon will partner with the physician prescribing these medications to determine if it is safe to stop or if you need to continue taking. Please do not stop taking these medications without instructions from your surgeon. 7. In an effort to help prevent surgical site infection, we ask that you shower with an anti-bacterial soap (i.e. Dial or Safeguard) on the morning of your procedure. Do not apply any lotions, powders, or deodorants after showering. 8. Wear comfortable clothes. Wear glasses instead of contacts. Do not bring any jewelry or money (other than copays or fees as instructed). Do not wear make-up, particularly mascara, the morning of your procedure. Wear your hair loose or down, no ponytails, buns, jurgen pins or clips. All body piercings must be removed. 9. You should understand that if you do not follow these instructions your procedure may be cancelled. If your physical condition changes (i.e. fever, cold or flu) please contact your surgeon as soon as possible. 10. It is important that you be on time. If a situation occurs where you may be late, or if you have any questions or problems, please call [phone]. 11. Your procedure time may be subject to change. You will receive a phone call the day prior to confirm your arrival time. Special Instructions:    BRING COVID VACCINATION CARD MORNING OF SURGERY    Take all medications and inhalers, as prescribed, on the morning of surgery with a sip of water EXCEPT: n/a      Insulin Dependent Diabetic patients: Take your diabetic medications as prescribed the day before surgery. Hold all diabetic medications the day of surgery. If you are scheduled to arrive for surgery after 8:00 AM, and your AM blood sugar is >200, please call Ambulatory Surgery. I understand a pre-operative phone call will be made to verify my procedure time. In the event that I am not available, I give permission for a message to be left on my answering service and/or with another person?       yes         ___________________      ___________________ ___________________  (Signature of Patient)          (Witness)                   (Date and Time)

## 2021-06-29 NOTE — PERIOP NOTES
Patient wife was calling because they received a phone call from a number asking for information trying to register the patient for the surgery, since they could not tell the patient the name or date of birth of who they were calling, patient or wife would not give any information out. Called the number that patient gave me, and it is the pre-registration number. Per pre-registration, they have an auto  number that will call for them to pre-register, per pre-registration, the patient does not have to use them they can wait until they are here to do all the registering. Patient wife stated that they will wait until they come in here to register. Made patient and wife aware that that is fine.

## 2021-07-01 ENCOUNTER — ANESTHESIA EVENT (OUTPATIENT)
Dept: SURGERY | Age: 81
End: 2021-07-01
Payer: MEDICARE

## 2021-07-02 ENCOUNTER — HOSPITAL ENCOUNTER (OUTPATIENT)
Age: 81
Setting detail: OUTPATIENT SURGERY
Discharge: HOME | End: 2021-07-02
Attending: SPECIALIST | Admitting: SPECIALIST
Payer: MEDICARE

## 2021-07-02 ENCOUNTER — ANESTHESIA (OUTPATIENT)
Dept: SURGERY | Age: 81
End: 2021-07-02
Payer: MEDICARE

## 2021-07-02 VITALS
SYSTOLIC BLOOD PRESSURE: 102 MMHG | BODY MASS INDEX: 23.55 KG/M2 | DIASTOLIC BLOOD PRESSURE: 72 MMHG | RESPIRATION RATE: 13 BRPM | TEMPERATURE: 97.9 F | WEIGHT: 159 LBS | OXYGEN SATURATION: 96 % | HEIGHT: 69 IN | HEART RATE: 75 BPM

## 2021-07-02 PROCEDURE — 74011250636 HC RX REV CODE- 250/636: Performed by: NURSE ANESTHETIST, CERTIFIED REGISTERED

## 2021-07-02 PROCEDURE — 2709999900 HC NON-CHARGEABLE SUPPLY: Performed by: SPECIALIST

## 2021-07-02 PROCEDURE — 88305 TISSUE EXAM BY PATHOLOGIST: CPT

## 2021-07-02 PROCEDURE — 76030000000 HC AMB SURG OR TIME 0.5 TO 1: Performed by: SPECIALIST

## 2021-07-02 PROCEDURE — 74011000250 HC RX REV CODE- 250: Performed by: NURSE ANESTHETIST, CERTIFIED REGISTERED

## 2021-07-02 PROCEDURE — 76210000050 HC AMBSU PH II REC 0.5 TO 1 HR: Performed by: SPECIALIST

## 2021-07-02 PROCEDURE — 77030021352 HC CBL LD SYS DISP COVD -B: Performed by: SPECIALIST

## 2021-07-02 PROCEDURE — 76060000061 HC AMB SURG ANES 0.5 TO 1 HR: Performed by: SPECIALIST

## 2021-07-02 PROCEDURE — 74011250636 HC RX REV CODE- 250/636: Performed by: ANESTHESIOLOGY

## 2021-07-02 PROCEDURE — 77030029384 HC SNR POLYP CAPTVR II BSC -B: Performed by: SPECIALIST

## 2021-07-02 RX ORDER — SODIUM CHLORIDE, SODIUM LACTATE, POTASSIUM CHLORIDE, CALCIUM CHLORIDE 600; 310; 30; 20 MG/100ML; MG/100ML; MG/100ML; MG/100ML
25 INJECTION, SOLUTION INTRAVENOUS CONTINUOUS
Status: DISCONTINUED | OUTPATIENT
Start: 2021-07-02 | End: 2021-07-02 | Stop reason: HOSPADM

## 2021-07-02 RX ORDER — PHENYLEPHRINE HCL IN 0.9% NACL 0.4MG/10ML
SYRINGE (ML) INTRAVENOUS AS NEEDED
Status: DISCONTINUED | OUTPATIENT
Start: 2021-07-02 | End: 2021-07-02 | Stop reason: HOSPADM

## 2021-07-02 RX ORDER — SODIUM CHLORIDE 0.9 % (FLUSH) 0.9 %
5-40 SYRINGE (ML) INJECTION AS NEEDED
Status: DISCONTINUED | OUTPATIENT
Start: 2021-07-02 | End: 2021-07-02 | Stop reason: HOSPADM

## 2021-07-02 RX ORDER — SODIUM CHLORIDE 0.9 % (FLUSH) 0.9 %
5-40 SYRINGE (ML) INJECTION EVERY 8 HOURS
Status: DISCONTINUED | OUTPATIENT
Start: 2021-07-02 | End: 2021-07-02 | Stop reason: HOSPADM

## 2021-07-02 RX ORDER — HYDROMORPHONE HYDROCHLORIDE 1 MG/ML
.2-.5 INJECTION, SOLUTION INTRAMUSCULAR; INTRAVENOUS; SUBCUTANEOUS ONCE
Status: DISCONTINUED | OUTPATIENT
Start: 2021-07-02 | End: 2021-07-02 | Stop reason: HOSPADM

## 2021-07-02 RX ORDER — PROPOFOL 10 MG/ML
INJECTION, EMULSION INTRAVENOUS AS NEEDED
Status: DISCONTINUED | OUTPATIENT
Start: 2021-07-02 | End: 2021-07-02 | Stop reason: HOSPADM

## 2021-07-02 RX ORDER — EPHEDRINE SULFATE/0.9% NACL/PF 50 MG/5 ML
SYRINGE (ML) INTRAVENOUS AS NEEDED
Status: DISCONTINUED | OUTPATIENT
Start: 2021-07-02 | End: 2021-07-02 | Stop reason: HOSPADM

## 2021-07-02 RX ORDER — LIDOCAINE HYDROCHLORIDE 20 MG/ML
INJECTION, SOLUTION EPIDURAL; INFILTRATION; INTRACAUDAL; PERINEURAL AS NEEDED
Status: DISCONTINUED | OUTPATIENT
Start: 2021-07-02 | End: 2021-07-02 | Stop reason: HOSPADM

## 2021-07-02 RX ORDER — OXYCODONE AND ACETAMINOPHEN 5; 325 MG/1; MG/1
1 TABLET ORAL
Status: DISCONTINUED | OUTPATIENT
Start: 2021-07-02 | End: 2021-07-02 | Stop reason: HOSPADM

## 2021-07-02 RX ORDER — LIDOCAINE HYDROCHLORIDE 10 MG/ML
0.1 INJECTION, SOLUTION EPIDURAL; INFILTRATION; INTRACAUDAL; PERINEURAL AS NEEDED
Status: DISCONTINUED | OUTPATIENT
Start: 2021-07-02 | End: 2021-07-02 | Stop reason: HOSPADM

## 2021-07-02 RX ORDER — DIPHENHYDRAMINE HYDROCHLORIDE 50 MG/ML
12.5 INJECTION, SOLUTION INTRAMUSCULAR; INTRAVENOUS AS NEEDED
Status: DISCONTINUED | OUTPATIENT
Start: 2021-07-02 | End: 2021-07-02 | Stop reason: HOSPADM

## 2021-07-02 RX ORDER — FENTANYL CITRATE 50 UG/ML
25 INJECTION, SOLUTION INTRAMUSCULAR; INTRAVENOUS
Status: DISCONTINUED | OUTPATIENT
Start: 2021-07-02 | End: 2021-07-02 | Stop reason: HOSPADM

## 2021-07-02 RX ADMIN — PROPOFOL 20 MG: 10 INJECTION, EMULSION INTRAVENOUS at 07:51

## 2021-07-02 RX ADMIN — Medication 80 MCG: at 08:07

## 2021-07-02 RX ADMIN — PROPOFOL 20 MG: 10 INJECTION, EMULSION INTRAVENOUS at 07:38

## 2021-07-02 RX ADMIN — Medication 20 MG: at 07:46

## 2021-07-02 RX ADMIN — PROPOFOL 20 MG: 10 INJECTION, EMULSION INTRAVENOUS at 07:53

## 2021-07-02 RX ADMIN — Medication 80 MCG: at 08:00

## 2021-07-02 RX ADMIN — PROPOFOL 20 MG: 10 INJECTION, EMULSION INTRAVENOUS at 07:42

## 2021-07-02 RX ADMIN — PROPOFOL 20 MG: 10 INJECTION, EMULSION INTRAVENOUS at 07:48

## 2021-07-02 RX ADMIN — Medication 10 MG: at 07:39

## 2021-07-02 RX ADMIN — SODIUM CHLORIDE, POTASSIUM CHLORIDE, SODIUM LACTATE AND CALCIUM CHLORIDE 25 ML/HR: 600; 310; 30; 20 INJECTION, SOLUTION INTRAVENOUS at 06:59

## 2021-07-02 RX ADMIN — PROPOFOL 30 MG: 10 INJECTION, EMULSION INTRAVENOUS at 07:34

## 2021-07-02 RX ADMIN — PROPOFOL 20 MG: 10 INJECTION, EMULSION INTRAVENOUS at 07:46

## 2021-07-02 RX ADMIN — Medication 80 MCG: at 07:50

## 2021-07-02 RX ADMIN — Medication 10 MG: at 07:37

## 2021-07-02 RX ADMIN — LIDOCAINE HYDROCHLORIDE 100 MG: 20 INJECTION, SOLUTION INTRAVENOUS at 07:34

## 2021-07-02 NOTE — PERIOP NOTES
Pt's initial blood pressure in recovery low. Nurse Anesthetist at bedside and giving [facility]. Pt alert and communicating. Will continue to monitor. 0822-D/c instructions reviewed, all questions answered. Reviewed when to call the doctor, diet, activity and hygiene. 0844-Transported via w/c to awaiting transportation.

## 2021-07-02 NOTE — ANESTHESIA PREPROCEDURE EVALUATION
Anesthetic History   No history of anesthetic complications            Review of Systems / Medical History  Patient summary reviewed, nursing notes reviewed and pertinent labs reviewed    Pulmonary          Smoker ( 50 pk yrs)      Comments: Former smoker - Quit 1987 - 50 pack years   Neuro/Psych             Comments: Peripheral Neuropathy Cardiovascular    Hypertension: well controlled              Exercise tolerance: >4 METS     GI/Hepatic/Renal     GERD: well controlled      Liver disease    Comments: Esophageal Dysphagia  H/O Esophageal Cancer  CHAVEZ  H/O Colon Polyps  Diverticulosis Endo/Other    Diabetes: well controlled, type 2    Arthritis and cancer ( Esophageal cancer )    Comments: Diet-controlled DMII Other Findings   Comments: Vitamin D Deficiency           Physical Exam    Airway  Mallampati: I  TM Distance: > 6 cm  Neck ROM: normal range of motion   Mouth opening: Normal     Cardiovascular    Rhythm: regular  Rate: normal         Dental    Dentition: Full lower dentures and Full upper dentures     Pulmonary  Breath sounds clear to auscultation               Abdominal  GI exam deferred       Other Findings            Anesthetic Plan    ASA: 3  Anesthesia type: total IV anesthesia and MAC          Induction: Intravenous  Anesthetic plan and risks discussed with: Patient

## 2021-07-02 NOTE — PERIOP NOTES
Permission received to review discharge instructions and discuss private health information with wife and will be with patient after discharge.

## 2021-07-02 NOTE — H&P
.  Pre-endoscopy H and P    The patient was seen and examined in the [facility] pre op. The airway was assessed and docuemented. The problem list, past medical history, and medications were reviewed. 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 J38.01    Vitamin D deficiency E55.9    Hyperkalemia E87.5    Stage 3 chronic kidney disease (HCC) N18.30     Social History     Socioeconomic History    Marital status:      Spouse name: Not on file    Number of children: Not on file    Years of education: Not on file    Highest education level: Not on file   Occupational History    Not on file   Tobacco Use    Smoking status: Former Smoker     Packs/day: 2.00     Years: 25.00     Pack years: 50.00     Quit date: 1987     Years since quittin.5    Smokeless tobacco: Never Used   Vaping Use    Vaping Use: Never used   Substance and Sexual Activity    Alcohol use: No    Drug use: No    Sexual activity: Not on file   Other Topics Concern    Not on file   Social History Narrative    Not on file     Social Determinants of Health     Financial Resource Strain:     Difficulty of Paying Living Expenses:    Food Insecurity:     Worried About Running Out of Food in the Last Year:     Ran Out of Food in the Last Year:    Transportation Needs:     Lack of Transportation (Medical):      Lack of Transportation (Non-Medical):    Physical Activity:     Days of Exercise per Week:     Minutes of Exercise per Session:    Stress:     Feeling of Stress :    Social Connections:     Frequency of Communication with Friends and Family:     Frequency of Social Gatherings with Friends and Family:     Attends [employer] Services:     Active Member of Clubs or Organizations:     Attends Club or Organization Meetings:     Marital Status:    Intimate Partner Violence:     Fear of Current or Ex-Partner:     Emotionally Abused:     Physically Abused:     Sexually Abused:      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 1/16/2018    Diet-controlled diabetes mellitus ([facility]     Diverticulosis     Drug-induced polyneuropathy ([facility] 1/16/2018    Due to chemotherapy    Esophageal cancer ([facility] [address] 2008    tx esophagectomy, chemo, radiation (complete 2014)    GERD (gastroesophageal reflux disease)     GERD with stricture 1/16/2018    Status post dilatation    Hx of colonic polyps     Hyperkalemia 4/29/2019    Hypertension     Kidney cyst     CHAVEZ (nonalcoholic steatohepatitis) 2000    \"fatty liver\" diet related    Personal history of colonic polyps 12/6/2012    Stage 3 chronic kidney disease ([facility] [address] [facility] 4/29/2019    Thrombocytopenia ([facility] [address] [facility]     r/t chemo    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 Taking? CHOLESTYRAMINE/ASPARTAME (PREVALITE PO) 6/25/2021 at Unknown time  Yes Yes   Sig: Take 5.5 g by mouth every evening. CYANOCOBALAMIN (VITAMIN B-12 PO) 6/25/2021 at Unknown time  Yes Yes   Sig: Take 1,000 mg by mouth daily. MULTIVITAMINS (MULTIVITAMIN PO) 6/25/2021 at Unknown time  Yes Yes   Sig: Take 1 Tablet by mouth daily. VITAMINS A AND D PO 6/25/2021 at Unknown time  Yes Yes   Sig: Take 1 Cap by mouth daily.    cholecalciferol, vitamin D3, (VITAMIN D3) 50 mcg (2,000 unit) tab 6/25/2021 at Unknown time  Yes Yes   Sig: Take 1 Tab by mouth daily. esomeprazole (NEXIUM) 40 mg capsule 6/25/2021 at Unknown time  No Yes   Sig: TAKE 1 CAPSULE DAILY   folic acid-vit G4-HCX E05 (FOLGARD RX) 2.2-25-1 mg tab 6/25/2021 at Unknown time  Yes Yes   Sig: Take 1 Tab by mouth daily. hydroCHLOROthiazide (HYDRODIURIL) 12.5 mg tablet 6/25/2021 at Unknown time  No Yes   Sig: TAKE 1 TABLET EVERY OTHER DAY   lisinopril (PRINIVIL, ZESTRIL) 20 mg tablet 6/25/2021 at Unknown time  Yes Yes   Sig: Take 20 mg by mouth daily. pyridoxine (VITAMIN B-6) 100 mg tablet 6/25/2021 at Unknown time  Yes Yes   Sig: Take 100 mg by mouth daily. sildenafil citrate (VIAGRA) 50 mg tablet   Yes Yes   Sig: Take 50 mg by mouth as needed. Facility-Administered Medications: None           The review of systems is:  negative for shortness of breath or chest pain      The heart, lungs, and mental status were satisfactory for the administration of anesthesia sedation and for the procedure. I discussed with the patient the objectives, risks, consequences and alternatives to the procedure. The patient was counseled at length about the risks of valencia Covid-19 during their perioperative period and any recovery window from their procedure. The patient was made aware that valencia Covid-19  may worsen their prognosis for recovering from their procedure and lend to a higher morbidity and/or mortality risk. All material risks, benefits, and reasonable alternatives including postponing the procedure were discussed. The patient does  wish to proceed with the procedure at this time.         Kip Lui MD  7/2/2021  7:20 AM

## 2021-07-02 NOTE — PERIOP NOTES
Jose Wolfe  1940  [id number]    Situation:  Verbal report given from: CHUCK Trotter CRNA, RN  Procedure: Procedure(s):  COLONOSCOPY  ENDOSCOPIC POLYPECTOMY    Background:    Preoperative diagnosis: GERD, SCREENING, LONG TERM (CURRENT) USE OF OTHER MEDICATIONS    Postoperative diagnosis: Diverticulosis, tattoo at ascending colon, thick fold vs lipoma at ascending colon, polyp in proximal transverse colon    :  Dr. Jillian Abreu    Assistant(s): Circ-1: Carmen Burns  Circ-2: Vianca Mercer RN  Scrub Tech-1: Alexander Fernando    Specimens:   ID Type Source Tests Collected by Time Destination   1 : Transverse colon polyp Preservative Colon, Transverse  Dante Russell MD 7/2/2021 7045 Pathology       Assessment:  Intra-procedure medications   Propofol 150 mg      Anesthesia gave intra-procedure sedation and medications, see anesthesia flow sheet     Intravenous fluids: LR@ KVO     Vital signs stable     Abdominal assessment: round and soft       Recommendation:        All side rails up, bed in low position, wheels locked. Nurse at bedside.

## 2021-07-02 NOTE — DISCHARGE INSTRUCTIONS
Vic Smart  [id number]  1940              Procedure  Discharge Instructions:      Discomfort:  Redness at IV site- apply cold compress to area; if redness or soreness persist- contact your physician  There may be a slight amount of blood passed from the rectum  Gaseous discomfort- walking, belching will help relieve any discomfort  You may not operate a vehicle for 12 hours  You may not engage in an occupation involving machinery or appliances for rest of today  You may not drink alcoholic beverages for at least 12 hours  Avoid making any critical decisions for at least 24 hour  DIET:   You may resume your normal diet today. You should not overeat or \"feast\" today as your abdomen may become distended or uncomfortable. MEDICATIONS:   I reconciled this list from the list you gave us when you came today for the procedure. Please clarify with me, your primary care physician and the nurse who is discharging you if we have any discrepancies. Aspirin and or non-steroidal medication (Ibuprofen, Motrin, naproxen, etc.) is ok in limited quantities. ACTIVITY:  You may resume your normal daily activities it is recommended that you spend the remainder of the day resting -  avoid any strenuous activity. CALL M.D. ANY SIGN OF:  Increasing pain, nausea, vomiting  Abdominal distension (swelling)  New increased bleeding (oral or rectal)  Fever (chills)  Pain in chest area  Bloody discharge from nose or mouth  Shortness of breath          Follow-up Instructions:   Call Dr. Juan Antonio Gaines for the results of  biopsy in approximately one week  Telephone #  [phone]  Follow up visit as needed or as previously scheduled. Ranjeet Mckeon MD  8:03 AM  7/2/2021         DO NOT TAKE SLEEPING MEDICATIONS OR ANTIANXIETY MEDICATIONS WHILE TAKING NARCOTIC PAIN MEDICATIONS,  ESPECIALLY THE NIGHT OF ANESTHESIA. CPAP PATIENTS BE SURE TO WEAR MACHINE WHENEVER NAPPING OR SLEEPING.     DISCHARGE SUMMARY from Nurse    The following personal items collected during your admission are returned to you:   Dental Appliance: Dental Appliances: Lowers, Uppers  Vision: Visual Aid: Glasses  Hearing Aid:    Jewelry:    Clothing:    Other Valuables:    Valuables sent to safe:        PATIENT INSTRUCTIONS:    Anesthesia Discharge Instructions for Procedural Area requiring Sedation (MAC Anesthesia, Cath Lab, Endo and Radiology): You have been given medications during your procedure that may affect your memory and mental judgement for the next 24 hours. During this time frame for your safety, please follow the instructions listed below :    Have a responsible adult to drive you home and be with you for at least 12 hours.  Rest today and resume normal activities tomorrow.  Start with a soft bland diet and advance as tolerated to your recommended diet.  Do not drive any motor vehicle or operate mechanical or electrical equipment prior to [employer].  Avoid making critical decisions or signing legal documents prior to 6am tomorrow.  Do not drink alcohol prior to 6am tomorrow.  If you have sleep apnea and you plan to go home and take a nap, please use your CPAP machine not only at bedtime, but also while napping for 24 hours.  If you notice any redness or swelling on parts of your body where IV medications were given, place a warm wet washcloth over the area for 20 minutes at a time until the redness or swelling goes away. If you still have redness or swelling after 2-3 days, please call us. · You will receive a Post Operative Call from one of the Recovery Room Nurses on the day after your surgery to check on you. It is very important for us to know how you are recovering after your surgery. If you have an issue or need to speak with someone, please call your surgeon, do not wait for the post operative call.     · You may receive an e-mail or letter in the mail from [employer] regarding your experience with us in the Ambulatory Surgery Unit. Your feedback is valuable to us and we appreciate your participation in the survey. · We wish you a speedy recovery ? What to do at Home:      *  Please give a list of your current medications to your Primary Care Provider. *  Please update this list whenever your medications are discontinued, doses are      changed, or new medications (including over-the-counter products) are added. *  Please carry medication information at all times in case of emergency situations. If you have not received your influenza and/or pneumococcal vaccine, please follow up with your primary care physician. The discharge information has been reviewed with the patient and caregiver. The patient and caregiver verbalized understanding.

## 2021-07-02 NOTE — PROCEDURES
Colonoscopy    Indications: history colon polyp      Pre-operative Diagnosis: see above    Assistant(s):  see chart   ej Pena    Medications:  See anesthesia form    Post-operative Diagnosis:  Diverticulosis, tattoo at ascending colon, thick fold vs lipoma at ascending colon, polyp in proximal transverse colon        Procedure Details   Prior to the procedure its objectives, risks, consequences and alternatives were discussed with the patient who then elected to proceed. All questions were answered. Digital Rectal Exam:  was normal     The Olympus videocolonoscope was inserted in the rectum and advanced to the cecum. The cecum was identified by typical landmarks. The colonoscope was slowly and carefully withdrawn as the mucosa was inspected. In the ascending colon there was a thick fold versus lipoma. I took photos of this but did not biopsy it. It appeared benign. At the proximal transverse colon an 8mm sessile polyp was hot snared with removal of 80 percent of the polyp and the remnant of the polyp was removed by cold biopsy. Tattoos were present in the proximal ascending colon. Diverticula were present rhroughout. No other abnormalities were noted. Retroflexion in the rectum was unremarkable. .    Photos to document the ileocecal valve, appendiceal orifice and retroflexion exam were obtained. The preparation was adequate      Estimated Blood Loss:  none    Specimens:  Transverse polyp    Findings:  One polyp, piecemeal removed  Possible lipoma ascending colon  diverticula    Complications:  none    Implants:  none    Repeat colonoscopy is recommended in:  (consider in one to three years) .                Emily Pappas MD  7:58 AM  7/2/2021

## 2021-07-02 NOTE — ANESTHESIA POSTPROCEDURE EVALUATION
Procedure(s):  COLONOSCOPY  ENDOSCOPIC POLYPECTOMY. total IV anesthesia, MAC    Anesthesia Post Evaluation        Patient location during evaluation: PACU  Note status: Adequate. Level of consciousness: responsive to verbal stimuli and sleepy but conscious  Pain management: satisfactory to patient  Airway patency: patent  Anesthetic complications: no  Cardiovascular status: acceptable  Respiratory status: acceptable  Hydration status: acceptable  Comments: +Post-Anesthesia Evaluation and Assessment    Patient: Casie Cuellar MRN: [id number]  SSN: [id number]   YOB: 1940  Age: 80 y.o. Sex: male      Cardiovascular Function/Vital Signs    BP 94/62   Pulse 76   Temp 36.7 °C (98.1 °F)   Resp 14   Ht 5' 9\" (1.753 m)   Wt 72.1 kg (159 lb)   SpO2 95%   BMI 23.48 kg/m²     Patient is status post Procedure(s):  COLONOSCOPY  ENDOSCOPIC POLYPECTOMY. Nausea/Vomiting: Controlled. Postoperative hydration reviewed and adequate. Pain:  Pain Scale 1: Numeric (0 - 10) (07/02/21 0814)  Pain Intensity 1: 0 (07/02/21 8502)   Managed. Neurological Status:   Neuro (WDL): Exceptions to WDL (07/02/21 0814)   At baseline. Mental Status and Level of Consciousness: Arousable. Pulmonary Status:   O2 Device: None (Room air) (07/02/21 0814)   Adequate oxygenation and airway patent. Complications related to anesthesia: None    Post-anesthesia assessment completed. No concerns. Signed By: Prosper Camacho MD    7/2/2021  Post anesthesia nausea and vomiting:  controlled      INITIAL Post-op Vital signs:   Vitals Value Taken Time   BP 92/60 07/02/21 0816   Temp 36.7 °C (98.1 °F) 07/02/21 0807   Pulse 76 07/02/21 0821   Resp 19 07/02/21 0821   SpO2 95 % 07/02/21 0821   Vitals shown include unvalidated device data.

## 2021-08-26 ENCOUNTER — OFFICE VISIT (OUTPATIENT)
Dept: INTERNAL MEDICINE CLINIC | Age: 81
End: 2021-08-26
Payer: MEDICARE

## 2021-08-26 VITALS
WEIGHT: 160.2 LBS | OXYGEN SATURATION: 100 % | BODY MASS INDEX: 23.73 KG/M2 | HEIGHT: 69 IN | RESPIRATION RATE: 14 BRPM | SYSTOLIC BLOOD PRESSURE: 110 MMHG | DIASTOLIC BLOOD PRESSURE: 70 MMHG | TEMPERATURE: 97.5 F | HEART RATE: 65 BPM

## 2021-08-26 DIAGNOSIS — K22.2 GERD WITH STRICTURE: Chronic | ICD-10-CM

## 2021-08-26 DIAGNOSIS — G62.0 DRUG-INDUCED POLYNEUROPATHY (HCC): Chronic | ICD-10-CM

## 2021-08-26 DIAGNOSIS — E55.9 VITAMIN D DEFICIENCY: Chronic | ICD-10-CM

## 2021-08-26 DIAGNOSIS — E11.9 TYPE 2 DIABETES MELLITUS WITHOUT COMPLICATION, WITHOUT LONG-TERM CURRENT USE OF INSULIN (HCC): Primary | Chronic | ICD-10-CM

## 2021-08-26 DIAGNOSIS — K21.9 GERD WITH STRICTURE: Chronic | ICD-10-CM

## 2021-08-26 DIAGNOSIS — I10 ESSENTIAL HYPERTENSION: Chronic | ICD-10-CM

## 2021-08-26 LAB
25(OH)D3 SERPL-MCNC: 44.1 NG/ML (ref 30–100)
ALBUMIN SERPL-MCNC: 3.9 G/DL (ref 3.5–5)
ALBUMIN/GLOB SERPL: 1.2 {RATIO} (ref 1.1–2.2)
ALP SERPL-CCNC: 83 U/L (ref 45–117)
ALT SERPL-CCNC: 18 U/L (ref 12–78)
ANION GAP SERPL CALC-SCNC: 6 MMOL/L (ref 5–15)
APPEARANCE UR: CLEAR
AST SERPL-CCNC: 16 U/L (ref 15–37)
BACTERIA URNS QL MICRO: NEGATIVE /HPF
BASOPHILS # BLD: 0.1 K/UL (ref 0–0.1)
BASOPHILS NFR BLD: 1 % (ref 0–1)
BILIRUB SERPL-MCNC: 0.5 MG/DL (ref 0.2–1)
BILIRUB UR QL: NEGATIVE
BUN SERPL-MCNC: 17 MG/DL (ref 6–20)
BUN/CREAT SERPL: 14 (ref 12–20)
CALCIUM SERPL-MCNC: 9.7 MG/DL (ref 8.5–10.1)
CHLORIDE SERPL-SCNC: 106 MMOL/L (ref 97–108)
CHOLEST SERPL-MCNC: 152 MG/DL
CO2 SERPL-SCNC: 27 MMOL/L (ref 21–32)
COLOR UR: NORMAL
CREAT SERPL-MCNC: 1.18 MG/DL (ref 0.7–1.3)
CREAT UR-MCNC: 52.8 MG/DL
DIFFERENTIAL METHOD BLD: NORMAL
EOSINOPHIL # BLD: 0.2 K/UL (ref 0–0.4)
EOSINOPHIL NFR BLD: 3 % (ref 0–7)
EPITH CASTS URNS QL MICRO: NORMAL /LPF
ERYTHROCYTE [DISTWIDTH] IN BLOOD BY AUTOMATED COUNT: 14.1 % (ref 11.5–14.5)
EST. AVERAGE GLUCOSE BLD GHB EST-MCNC: 126 MG/DL
GLOBULIN SER CALC-MCNC: 3.2 G/DL (ref 2–4)
GLUCOSE SERPL-MCNC: 92 MG/DL (ref 65–100)
GLUCOSE UR STRIP.AUTO-MCNC: NEGATIVE MG/DL
HBA1C MFR BLD: 6 % (ref 4–5.6)
HCT VFR BLD AUTO: 45.2 % (ref 36.6–50.3)
HDLC SERPL-MCNC: 55 MG/DL
HDLC SERPL: 2.8 {RATIO} (ref 0–5)
HGB BLD-MCNC: 14.4 G/DL (ref 12.1–17)
HGB UR QL STRIP: NEGATIVE
HYALINE CASTS URNS QL MICRO: NORMAL /LPF (ref 0–5)
IMM GRANULOCYTES # BLD AUTO: 0 K/UL (ref 0–0.04)
IMM GRANULOCYTES NFR BLD AUTO: 0 % (ref 0–0.5)
KETONES UR QL STRIP.AUTO: NEGATIVE MG/DL
LDLC SERPL CALC-MCNC: 78.4 MG/DL (ref 0–100)
LEUKOCYTE ESTERASE UR QL STRIP.AUTO: NEGATIVE
LYMPHOCYTES # BLD: 1.9 K/UL (ref 0.8–3.5)
LYMPHOCYTES NFR BLD: 28 % (ref 12–49)
MCH RBC QN AUTO: 28.9 PG (ref 26–34)
MCHC RBC AUTO-ENTMCNC: 31.9 G/DL (ref 30–36.5)
MCV RBC AUTO: 90.6 FL (ref 80–99)
MICROALBUMIN UR-MCNC: 0.65 MG/DL
MICROALBUMIN/CREAT UR-RTO: 12 MG/G (ref 0–30)
MONOCYTES # BLD: 0.6 K/UL (ref 0–1)
MONOCYTES NFR BLD: 9 % (ref 5–13)
NEUTS SEG # BLD: 4 K/UL (ref 1.8–8)
NEUTS SEG NFR BLD: 59 % (ref 32–75)
NITRITE UR QL STRIP.AUTO: NEGATIVE
NRBC # BLD: 0 K/UL (ref 0–0.01)
NRBC BLD-RTO: 0 PER 100 WBC
PH UR STRIP: 5 [PH] (ref 5–8)
PLATELET # BLD AUTO: 168 K/UL (ref 150–400)
PMV BLD AUTO: 11.9 FL (ref 8.9–12.9)
POTASSIUM SERPL-SCNC: 5.2 MMOL/L (ref 3.5–5.1)
PROT SERPL-MCNC: 7.1 G/DL (ref 6.4–8.2)
PROT UR STRIP-MCNC: NEGATIVE MG/DL
RBC # BLD AUTO: 4.99 M/UL (ref 4.1–5.7)
RBC #/AREA URNS HPF: NORMAL /HPF (ref 0–5)
SODIUM SERPL-SCNC: 139 MMOL/L (ref 136–145)
SP GR UR REFRACTOMETRY: 1.01 (ref 1–1.03)
TRIGL SERPL-MCNC: 93 MG/DL (ref ?–150)
TSH SERPL DL<=0.05 MIU/L-ACNC: 1.25 UIU/ML (ref 0.36–3.74)
UA: UC IF INDICATED,UAUC: NORMAL
UROBILINOGEN UR QL STRIP.AUTO: 0.2 EU/DL (ref 0.2–1)
VLDLC SERPL CALC-MCNC: 18.6 MG/DL
WBC # BLD AUTO: 6.7 K/UL (ref 4.1–11.1)
WBC URNS QL MICRO: NORMAL /HPF (ref 0–4)

## 2021-08-26 PROCEDURE — G8752 SYS BP LESS 140: HCPCS | Performed by: INTERNAL MEDICINE

## 2021-08-26 PROCEDURE — G8420 CALC BMI NORM PARAMETERS: HCPCS | Performed by: INTERNAL MEDICINE

## 2021-08-26 PROCEDURE — G8536 NO DOC ELDER MAL SCRN: HCPCS | Performed by: INTERNAL MEDICINE

## 2021-08-26 PROCEDURE — 99214 OFFICE O/P EST MOD 30 MIN: CPT | Performed by: INTERNAL MEDICINE

## 2021-08-26 PROCEDURE — G8432 DEP SCR NOT DOC, RNG: HCPCS | Performed by: INTERNAL MEDICINE

## 2021-08-26 PROCEDURE — G8427 DOCREV CUR MEDS BY ELIG CLIN: HCPCS | Performed by: INTERNAL MEDICINE

## 2021-08-26 PROCEDURE — G8754 DIAS BP LESS 90: HCPCS | Performed by: INTERNAL MEDICINE

## 2021-08-26 PROCEDURE — 1101F PT FALLS ASSESS-DOCD LE1/YR: CPT | Performed by: INTERNAL MEDICINE

## 2021-08-26 NOTE — PATIENT INSTRUCTIONS
DASH Diet: Care Instructions  Your Care Instructions     The DASH diet is an eating plan that can help lower your blood pressure. DASH stands for Dietary Approaches to Stop Hypertension. Hypertension is high blood pressure. The DASH diet focuses on eating foods that are high in calcium, potassium, and magnesium. These nutrients can lower blood pressure. The foods that are highest in these nutrients are fruits, vegetables, low-fat dairy products, nuts, seeds, and legumes. But taking calcium, potassium, and magnesium supplements instead of eating foods that are high in those nutrients does not have the same effect. The DASH diet also includes whole grains, fish, and poultry. The DASH diet is one of several lifestyle changes your doctor may recommend to lower your high blood pressure. Your doctor may also want you to decrease the amount of sodium in your diet. Lowering sodium while following the DASH diet can lower blood pressure even further than just the DASH diet alone. Follow-up care is a key part of your treatment and safety. Be sure to make and go to all appointments, and call your doctor if you are having problems. It's also a good idea to know your test results and keep a list of the medicines you take. How can you care for yourself at home? Following the DASH diet  · Eat 4 to 5 servings of fruit each day. A serving is 1 medium-sized piece of fruit, ½ cup chopped or canned fruit, 1/4 cup dried fruit, or 4 ounces (½ cup) of fruit juice. Choose fruit more often than fruit juice. · Eat 4 to 5 servings of vegetables each day. A serving is 1 cup of lettuce or raw leafy vegetables, ½ cup of chopped or cooked vegetables, or 4 ounces (½ cup) of vegetable juice. Choose vegetables more often than vegetable juice. · Get 2 to 3 servings of low-fat and fat-free dairy each day. A serving is 8 ounces of milk, 1 cup of yogurt, or 1 ½ ounces of cheese. · Eat 6 to 8 servings of grains each day.  A serving is 1 slice of bread, 1 ounce of dry cereal, or ½ cup of cooked rice, pasta, or cooked cereal. Try to choose whole-grain products as much as possible. · Limit lean meat, poultry, and fish to 2 servings each day. A serving is 3 ounces, about the size of a deck of cards. · Eat 4 to 5 servings of nuts, seeds, and legumes (cooked dried beans, lentils, and split peas) each week. A serving is 1/3 cup of nuts, 2 tablespoons of seeds, or ½ cup of cooked beans or peas. · Limit fats and oils to 2 to 3 servings each day. A serving is 1 teaspoon of vegetable oil or 2 tablespoons of salad dressing. · Limit sweets and added sugars to 5 servings or less a week. A serving is 1 tablespoon jelly or jam, ½ cup sorbet, or 1 cup of lemonade. · Eat less than 2,300 milligrams (mg) of sodium a day. If you limit your sodium to 1,500 mg a day, you can lower your blood pressure even more. · Be aware that all of these are the suggested number of servings for people who eat 1,800 to 2,000 calories a day. Your recommended number of servings may be different if you need more or fewer calories. Tips for success  · Start small. Do not try to make dramatic changes to your diet all at once. You might feel that you are missing out on your favorite foods and then be more likely to not follow the plan. Make small changes, and stick with them. Once those changes become habit, add a few more changes. · Try some of the following:  ? Make it a goal to eat a fruit or vegetable at every meal and at snacks. This will make it easy to get the recommended amount of fruits and vegetables each day. ? Try yogurt topped with fruit and nuts for a snack or healthy dessert. ? Add lettuce, tomato, cucumber, and onion to sandwiches. ? Combine a ready-made pizza crust with low-fat mozzarella cheese and lots of vegetable toppings. Try using tomatoes, squash, spinach, broccoli, carrots, cauliflower, and onions. ?  Have a variety of cut-up vegetables with a low-fat dip as an appetizer instead of chips and dip. ? Sprinkle sunflower seeds or chopped almonds over salads. Or try adding chopped walnuts or almonds to cooked vegetables. ? Try some vegetarian meals using beans and peas. Add garbanzo or kidney beans to salads. Make burritos and tacos with mashed cisse beans or black beans. Where can you learn more? Go to http://www.cerda.com/  Enter H967 in the search box to learn more about \"DASH Diet: Care Instructions. \"  Current as of: August 31, 2020               Content Version: 12.8  © 7483-6301 [employer]. Care instructions adapted under license by [employer] (which disclaims liability or warranty for this information). If you have questions about a medical condition or this instruction, always ask your healthcare professional. [address] any warranty or liability for your use of this information.

## 2021-08-26 NOTE — PROGRESS NOTES
Kodi Stanley is a 80 y.o. male presenting for Follow Up Chronic Condition (6 mo fu)  . 1. Have you been to the ER, urgent care clinic since your last visit? Hospitalized since your last visit? No    2. Have you seen or consulted any other health care providers outside of the [address] [place] [address] since your last visit? Include any pap smears or colon screening. Eye Dr Lee Díaz, last 12 mths 2/23/2021   Able to walk? Yes   Fall in past 12 months? 0   Do you feel unsteady? 0   Are you worried about falling 0         Abuse Screening Questionnaire 2/23/2021   Do you ever feel afraid of your partner? N   Are you in a relationship with someone who physically or mentally threatens you? N   Is it safe for you to go home? Y       3 most recent PHQ Screens 2/23/2021   Little interest or pleasure in doing things Not at all   Feeling down, depressed, irritable, or hopeless Not at all   Total Score PHQ 2 0       There are no discontinued medications.

## 2021-08-26 NOTE — PROGRESS NOTES
Subjective:     Mr. Chauncey Fam returns to the office today in follow-up of multiple medical problems. The patient has type 2 diabetes mellitus currently managing this with diet and weight control. He does not check his blood sugars. He is up-to-date on diabetic retinal eye exam.  He denies polyuria, polydipsia, blurred vision or unexplained weight loss. Last A1c was below 6. Blood pressure currently managed on hydrochlorothiazide and lisinopril. Denies cough, rash, orthostatic dizziness or muscle cramping. Has had no headaches, numbness, tingling or focal neurological problems. He has a prior history of GERD with stricture and also esophageal cancer and remains on daily Nexium. He denies any breakthrough heartburn and has had no dysphagia, early satiety or unexplained weight loss. He has drug-induced polyneuropathy which is currently managed on folic acid and B vitamin supplements. He denies any worsening of any type of pain but mostly has numbness. He has not been on gabapentin in the past.  He notes no problems with balance and has had no falls. He has vitamin D deficiency and continues to supplement this tube. He will need to have this level checked today.     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 1/16/2018    Diet-controlled diabetes mellitus ([address]     Diverticulosis     Drug-induced polyneuropathy ([address] 1/16/2018    Due to chemotherapy    Esophageal cancer ([address] 2008    tx esophagectomy, chemo, radiation (complete 2014)    GERD (gastroesophageal reflux disease)     GERD with stricture 1/16/2018    Status post dilatation    Hx of colonic polyps     Hyperkalemia 4/29/2019    Hypertension     Kidney cyst     CHAVEZ (nonalcoholic steatohepatitis) 2000    \"fatty liver\" diet related    Personal history of colonic polyps 12/6/2012    Stage 3 chronic kidney disease ([address] 4/29/2019    Thrombocytopenia [facility]     r/t chemo    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    Vitamin D deficiency 1/16/2018     Past Surgical History:   Procedure Laterality Date    COLONOSCOPY N/A 6/23/2016    COLONOSCOPY performed by Harley Suazo MD at [facility] ENDOSCOPY    COLONOSCOPY N/A 7/2/2021    COLONOSCOPY performed by Chanad Loera MD at [address] [place] [address]  7/2/2021         HX CATARACT REMOVAL Bilateral     HX CHOLECYSTECTOMY  9/29/11    HX ESOPHAGECTOMY  2008    HX HEMORRHOIDECTOMY      HX HERNIA REPAIR      HX ROTATOR CUFF REPAIR Right 2014    HX VASCULAR ACCESS Right 2008    port placed and removed in 2014    FL COLSC FLX W/RMVL OF TUMOR POLYP LESION SNARE TQ  7/1/2010         FL EGD BALLOON DILATION ESOPHAGUS <30 MM DIAM  3/3/2011         FL EGD BALLOON DILATION ESOPHAGUS <30 MM DIAM  5/22/2012         FL EGD TRANSORAL BIOPSY SINGLE/MULTIPLE  3/29/2012         UPPER GI ENDOSCOPY,BALL DIL,30MM  1/11/2018         UPPER GI ENDOSCOPY,BALL DIL,30MM  1/31/2020          No Known Allergies  Current Outpatient Medications   Medication Sig Dispense Refill    hydroCHLOROthiazide (HYDRODIURIL) 12.5 mg tablet TAKE 1 TABLET EVERY OTHER DAY 45 Tab 3    cholecalciferol, vitamin D3, (VITAMIN D3) 50 mcg (2,000 unit) tab Take 1 Tab by mouth daily.  lisinopril (PRINIVIL, ZESTRIL) 20 mg tablet Take 20 mg by mouth daily.  esomeprazole (NEXIUM) 40 mg capsule TAKE 1 CAPSULE DAILY 90 Cap 1    folic acid-vit D1-GXX X41 (FOLGARD RX) 2.2-25-1 mg tab Take 1 Tab by mouth daily.  VITAMINS A AND D PO Take 1 Cap by mouth daily.  sildenafil citrate (VIAGRA) 50 mg tablet Take 50 mg by mouth as needed.  CHOLESTYRAMINE/ASPARTAME (PREVALITE PO) Take 5.5 g by mouth every evening.  CYANOCOBALAMIN (VITAMIN B-12 PO) Take 1,000 mg by mouth daily.  pyridoxine (VITAMIN B-6) 100 mg tablet Take 100 mg by mouth daily.       MULTIVITAMINS (MULTIVITAMIN PO) Take 1 Tablet by mouth daily. Social History     Socioeconomic History    Marital status:      Spouse name: Not on file    Number of children: Not on file    Years of education: Not on file    Highest education level: Not on file   Tobacco Use    Smoking status: Former Smoker     Packs/day: 2.00     Years: 25.00     Pack years: 50.00     Quit date: 1987     Years since quittin.6    Smokeless tobacco: Never Used   Vaping Use    Vaping Use: Never used   Substance and Sexual Activity    Alcohol use: No    Drug use: No     Social Determinants of Health     Financial Resource Strain:     Difficulty of Paying Living Expenses:    Food Insecurity:     Worried About Running Out of Food in the Last Year:     [address] in the Last Year:    Transportation Needs:     Lack of Transportation (Medical):      Lack of Transportation (Non-Medical):    Physical Activity:     Days of Exercise per Week:     Minutes of Exercise per Session:    Stress:     Feeling of Stress :    Social Connections:     Frequency of Communication with Friends and Family:     Frequency of Social Gatherings with Friends and Family:     Attends [employer] Services:     Active Member of Clubs or Organizations:     Attends Club or Organization Meetings:     Marital Status:      Family History   Problem Relation Age of Onset    Diabetes Mother     Arthritis-osteo Father    Jose Emms Sister         lung    Cancer Sister         breast       Review of Systems:  GEN: no weight loss, weight gain, fatigue or night sweats  CV: no PND, orthopnea, or palpitations  Resp: no dyspnea on exertion, no cough  Abd: no nausea, vomiting or diarrhea  EXT: denies edema, claudication  Endocrine: no hair loss, excessive thirst or polyuria  Neurological ROS: no TIA or stroke symptoms  ROS otherwise negative      Objective:     Visit Vitals  /70   Pulse 65   Temp 97.5 °F (36.4 °C) (Oral)   Resp 14   Ht 5' 9\" (1.753 m)   Wt 160 lb 3.2 oz (72.7 kg)   SpO2 100%   BMI 23.66 kg/m²     Body mass index is 23.66 kg/m². General:   alert, cooperative and no distress   Eyes: conjunctivae/sclerae clear. PERRL, EOM's intact   Mouth:  No oral lesions, no pharyngeal erythema, no exudates   Neck: Trachea midline, no thyromegaly, no bruits   Heart: S1 and S2 normal,no murmurs noted    Lungs: Clear to auscultation bilaterally, no increased work of breathing   Abdomen: Soft, nontender. Normal bowel sounds   Extremities: No edema or cyanosis   Neuro: ..alert, oriented x3,speech normal in context and clarity, cranial nerves II-XII intact,motor strength: full proximally and distally,gait: normal  reflexes: full and symmetric     Physical exam otherwise negative         Assessment/Plan:     Diagnoses and all orders for this visit:    Type 2 diabetes mellitus without complication, without long-term current use of insulin ([facility])  -     COLLECTION VENOUS BLOOD,VENIPUNCTURE  -     HEMOGLOBIN A1C WITH EAG; Future  -     MICROALBUMIN, UR, RAND W/ MICROALB/CREAT RATIO; Future    Essential hypertension  -     CBC WITH AUTOMATED DIFF; Future  -     LIPID PANEL; Future  -     METABOLIC PANEL, COMPREHENSIVE; Future  -     TSH 3RD GENERATION; Future  -     URINALYSIS W/ REFLEX CULTURE; Future    Vitamin D deficiency  -     VITAMIN D, 25 HYDROXY; Future    GERD with stricture    Drug-induced polyneuropathy ([facility]        Other instructions: The patient's medications were reviewed and reconciled. No change in his current medical regimen will be made. A no added salt, prudent diet is encouraged    Patient counseled to obtain Covid booster when available    Await results of multiple labs    Follow-up in 6 months    Follow-up and Dispositions    · Return in about 6 months (around 2/26/2022). Xavier Simms MD    Please note that this dictation was completed with [employer], the [employer] voice recognition software.   Quite often unanticipated grammatical, syntax, homophones, and other interpretive errors are inadvertently transcribed by the computer software. Please disregard these errors. Please excuse any errors that have escaped final proofreading.

## 2022-03-01 ENCOUNTER — OFFICE VISIT (OUTPATIENT)
Dept: INTERNAL MEDICINE CLINIC | Age: 82
End: 2022-03-01
Payer: MEDICARE

## 2022-03-01 VITALS
RESPIRATION RATE: 14 BRPM | TEMPERATURE: 98 F | HEART RATE: 78 BPM | WEIGHT: 154.8 LBS | HEIGHT: 69 IN | SYSTOLIC BLOOD PRESSURE: 116 MMHG | BODY MASS INDEX: 22.93 KG/M2 | OXYGEN SATURATION: 100 % | DIASTOLIC BLOOD PRESSURE: 70 MMHG

## 2022-03-01 DIAGNOSIS — G62.0 DRUG-INDUCED POLYNEUROPATHY (HCC): Chronic | ICD-10-CM

## 2022-03-01 DIAGNOSIS — E55.9 VITAMIN D DEFICIENCY: Chronic | ICD-10-CM

## 2022-03-01 DIAGNOSIS — K21.9 GERD WITH STRICTURE: Chronic | ICD-10-CM

## 2022-03-01 DIAGNOSIS — E11.9 TYPE 2 DIABETES MELLITUS WITHOUT COMPLICATION, WITHOUT LONG-TERM CURRENT USE OF INSULIN (HCC): Chronic | ICD-10-CM

## 2022-03-01 DIAGNOSIS — K22.2 GERD WITH STRICTURE: Chronic | ICD-10-CM

## 2022-03-01 DIAGNOSIS — I10 PRIMARY HYPERTENSION: Primary | Chronic | ICD-10-CM

## 2022-03-01 PROCEDURE — 1101F PT FALLS ASSESS-DOCD LE1/YR: CPT | Performed by: INTERNAL MEDICINE

## 2022-03-01 PROCEDURE — G8752 SYS BP LESS 140: HCPCS | Performed by: INTERNAL MEDICINE

## 2022-03-01 PROCEDURE — G8420 CALC BMI NORM PARAMETERS: HCPCS | Performed by: INTERNAL MEDICINE

## 2022-03-01 PROCEDURE — G8510 SCR DEP NEG, NO PLAN REQD: HCPCS | Performed by: INTERNAL MEDICINE

## 2022-03-01 PROCEDURE — 99214 OFFICE O/P EST MOD 30 MIN: CPT | Performed by: INTERNAL MEDICINE

## 2022-03-01 PROCEDURE — G8536 NO DOC ELDER MAL SCRN: HCPCS | Performed by: INTERNAL MEDICINE

## 2022-03-01 PROCEDURE — G8754 DIAS BP LESS 90: HCPCS | Performed by: INTERNAL MEDICINE

## 2022-03-01 PROCEDURE — G8427 DOCREV CUR MEDS BY ELIG CLIN: HCPCS | Performed by: INTERNAL MEDICINE

## 2022-03-01 NOTE — PATIENT INSTRUCTIONS
Learning About Carbohydrate (Carb) Counting and Eating Out When You Have Diabetes  Why plan your meals? Meal planning can be a key part of managing diabetes. Planning meals and snacks with the right balance of carbohydrate, protein, and fat can help you keep your blood sugar at the target level you set with your doctor. You don't have to eat special foods. You can eat what your family eats, including sweets once in a while. But you do have to pay attention to how often you eat and how much you eat of certain foods. You may want to work with a dietitian or a certified diabetes educator. He or she can give you tips and meal ideas and can answer your questions about meal planning. This health professional can also help you reach a healthy weight if that is one of your goals. What should you know about eating carbs? Managing the amount of carbohydrate (carbs) you eat is an important part of healthy meals when you have diabetes. Carbohydrate is found in many foods. · Learn which foods have carbs. And learn the amounts of carbs in different foods. ? Bread, cereal, pasta, and rice have about 15 grams of carbs in a serving. A serving is 1 slice of bread (1 ounce), ½ cup of cooked cereal, or 1/3 cup of cooked pasta or rice. ? Fruits have 15 grams of carbs in a serving. A serving is 1 small fresh fruit, such as an apple or orange; ½ of a banana; ½ cup of cooked or canned fruit; ½ cup of fruit juice; 1 cup of melon or raspberries; or 2 tablespoons of dried fruit. ? Milk and no-sugar-added yogurt have 15 grams of carbs in a serving. A serving is 1 cup of milk or 2/3 cup of no-sugar-added yogurt. ? Starchy vegetables have 15 grams of carbs in a serving. A serving is ½ cup of mashed potatoes or sweet potato; 1 cup winter squash; ½ of a small baked potato; ½ cup of cooked beans; or ½ cup cooked corn or green peas.   · Learn how much carbs to eat each day and at each meal. A dietitian or CDE can teach you how to keep track of the amount of carbs you eat. This is called carbohydrate counting. · If you are not sure how to count carbohydrate grams, use the Plate Method to plan meals. It is a good, quick way to make sure that you have a balanced meal. It also helps you spread carbs throughout the day. ? Divide your plate by types of foods. Put non-starchy vegetables on half the plate, meat or other protein food on one-quarter of the plate, and a grain or starchy vegetable in the final quarter of the plate. To this you can add a small piece of fruit and 1 cup of milk or yogurt, depending on how many carbs you are supposed to eat at a meal.  · Try to eat about the same amount of carbs at each meal. Do not \"save up\" your daily allowance of carbs to eat at one meal.  · Proteins have very little or no carbs per serving. Examples of proteins are beef, chicken, turkey, fish, eggs, tofu, cheese, cottage cheese, and peanut butter. A serving size of meat is 3 ounces, which is about the size of a deck of cards. Examples of meat substitute serving sizes (equal to 1 ounce of meat) are 1/4 cup of cottage cheese, 1 egg, 1 tablespoon of peanut butter, and ½ cup of tofu. How can you eat out and still eat healthy? · Learn to estimate the serving sizes of foods that have carbohydrate. If you measure food at home, it will be easier to estimate the amount in a serving of restaurant food. · If the meal you order has too much carbohydrate (such as potatoes, corn, or baked beans), ask to have a low-carbohydrate food instead. Ask for a salad or green vegetables. · If you use insulin, check your blood sugar before and after eating out to help you plan how much to eat in the future. · If you eat more carbohydrate at a meal than you had planned, take a walk or do other exercise. This will help lower your blood sugar. What are some tips for eating healthy? · Limit saturated fat, such as the fat from meat and dairy products.  This is a healthy choice because people who have diabetes are at higher risk of heart disease. So choose lean cuts of meat and nonfat or low-fat dairy products. Use olive or canola oil instead of butter or shortening when cooking. · Don't skip meals. Your blood sugar may drop too low if you skip meals and take insulin or certain medicines for diabetes. · Check with your doctor before you drink alcohol. Alcohol can cause your blood sugar to drop too low. Alcohol can also cause a bad reaction if you take certain diabetes medicines. Follow-up care is a key part of your treatment and safety. Be sure to make and go to all appointments, and call your doctor if you are having problems. It's also a good idea to know your test results and keep a list of the medicines you take. Where can you learn more? Go to http://www.cerda.com/  Enter I147 in the search box to learn more about \"Learning About Carbohydrate (Carb) Counting and Eating Out When You Have Diabetes. \"  Current as of: December 17, 2020               Content Version: 13.0  © 2006-2021 [employer]. Care instructions adapted under license by [employer] (which disclaims liability or warranty for this information). If you have questions about a medical condition or this instruction, always ask your healthcare professional. [address] any warranty or liability for your use of this information.

## 2022-03-01 NOTE — PROGRESS NOTES
Naomi Ny is a 80 y.o. male presenting for Follow Up Chronic Condition (6 mo fu)  . 1. Have you been to the ER, urgent care clinic since your last visit? Hospitalized since your last visit? No    2. Have you seen or consulted any other health care providers outside of the [address] [place] [address] since your last visit? Include any pap smears or colon screening. No    Fall Risk Assessment, last 12 mths 3/1/2022   Able to walk? Yes   Fall in past 12 months? 0   Do you feel unsteady? 0   Are you worried about falling 0         Abuse Screening Questionnaire 3/1/2022   Do you ever feel afraid of your partner? N   Are you in a relationship with someone who physically or mentally threatens you? N   Is it safe for you to go home? Y       3 most recent PHQ Screens 3/1/2022   Little interest or pleasure in doing things Not at all   Feeling down, depressed, irritable, or hopeless Not at all   Total Score PHQ 2 0       There are no discontinued medications.

## 2022-03-01 NOTE — PROGRESS NOTES
Subjective:     Mr. David Reyes returns to the office today in follow-up of multiple medical problems. The patient has diet-controlled type 2 diabetes mellitus. He checks his blood sugars once daily with average fasting blood sugars below 100. The patient is up-to-date on diabetic retinal eye exam.  He denies polyuria, polydipsia or blurred vision. 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 He notes no progression of the disease. He notes most of his paresthesias are around his toes but have not advanced. They have not affected his gait or balance. Hypertension is managed on hydrochlorothiazide and lisinopril. He tolerates this regimen without muscle cramping, cough or orthostatic dizziness. Denies headaches, numbness, tingling or focal neurological problems. He has a history of esophageal cancer and GERD with stricture. He remains on Nexium therapy. He has had no breakthrough heartburn and denies dysphagia, early satiety or unexplained weight loss. He has vitamin D deficiency and continues to supplement this. He is due to have this level checked today.     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 1/16/2018    Diet-controlled diabetes mellitus ([address]     Diverticulosis     Drug-induced polyneuropathy ([address] 1/16/2018    Due to chemotherapy    Esophageal cancer ([address] 2008    tx esophagectomy, chemo, radiation (complete 2014)    GERD (gastroesophageal reflux disease)     GERD with stricture 1/16/2018    Status post dilatation    Hx of colonic polyps     Hyperkalemia 4/29/2019    Hypertension     Kidney cyst     CHAVEZ (nonalcoholic steatohepatitis) 2000    \"fatty liver\" diet related    Personal history of colonic polyps 12/6/2012    Stage 3 chronic kidney disease ([address] 4/29/2019    Thrombocytopenia (HCC)     r/t chemo    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    Vitamin D deficiency 1/16/2018     Past Surgical History:   Procedure Laterality Date    COLONOSCOPY N/A 6/23/2016    COL[facility]COPY [facility]    COLONOSCOPY N/A 7/2/2021    COLONOSCOPY performed by Alva Zavala MD at [address] [place] [address]  7/2/2021         HX CATARACT REMOVAL Bilateral     HX CHOLECYSTECTOMY  9/29/11    HX ESOPHAGECTOMY  2008    HX HEMORRHOIDECTOMY      HX HERNIA REPAIR      HX ROTATOR CUFF REPAIR Right 2014    HX VASCULAR ACCESS Right 2008    port placed and removed in 2014    [facility] COLSC FLX W/RMVL OF TUMOR POLYP LESION SNARE TQ  7/1/2010         VA EGD BALLOON DILATION ESOPHAGUS <30 MM DIAM  3/3/2011         VA EGD BALLOON DILATION ESOPHAGUS <30 MM DIAM  5/22/2012         [facility] EGD TRANSORAL BIOPSY SINGLE/MULTIPLE  3/29/2012         UPPER GI ENDOSCOPY,BALL DIL,30MM  1/11/2018         UPPER GI ENDOSCOPY,BALL DIL,30MM  1/31/2020          No Known Allergies  Current Outpatient Medications   Medication Sig Dispense Refill    hydroCHLOROthiazide (HYDRODIURIL) 12.5 mg tablet TAKE 1 TABLET EVERY OTHER DAY 45 Tablet 0    cholecalciferol, vitamin D3, (VITAMIN D3) 50 mcg (2,000 unit) tab Take 1 Tab by mouth daily.  lisinopril (PRINIVIL, ZESTRIL) 20 mg tablet Take 20 mg by mouth daily.  esomeprazole (NEXIUM) 40 mg capsule TAKE 1 CAPSULE DAILY 90 Cap 1    folic acid-vit A1-KKK B34 (FOLGARD RX) 2.2-25-1 mg tab Take 1 Tab by mouth daily.  VITAMINS A AND D PO Take 1 Cap by mouth daily.  sildenafil citrate (VIAGRA) 50 mg tablet Take 50 mg by mouth as needed.  CHOLESTYRAMINE/ASPARTAME (PREVALITE PO) Take 5.5 g by mouth every evening.  CYANOCOBALAMIN (VITAMIN B-12 PO) Take 1,000 mg by mouth daily.  pyridoxine (VITAMIN B-6) 100 mg tablet Take 100 mg by mouth daily.  MULTIVITAMINS (MULTIVITAMIN PO) Take 1 Tablet by mouth daily.        Social History Socioeconomic History    Marital status:    Tobacco Use    Smoking status: Former Smoker     Packs/day: 2.00     Years: 25.00     Pack years: 50.00     Quit date: 1987     Years since quittin.1    Smokeless tobacco: Never Used   Vaping Use    Vaping Use: Never used   Substance and Sexual Activity    Alcohol use: No    Drug use: No     Family History   Problem Relation Age of Onset    Diabetes Mother     OSTEOARTHRITIS Father     Cancer Sister         lung    Cancer Sister         breast       Review of Systems:  GEN: no weight loss, weight gain, fatigue or night sweats  CV: no PND, orthopnea, or palpitations  Resp: no dyspnea on exertion, no cough  Abd: no nausea, vomiting or diarrhea  EXT: denies edema, claudication  Endocrine: no hair loss, excessive thirst or polyuria  Neurological ROS: no TIA or stroke symptoms. Positive for paresthesias around toes  ROS otherwise negative      Objective:     Visit Vitals  /70 (BP 1 Location: Right upper arm, BP Patient Position: Sitting, BP Cuff Size: Adult)   Pulse 78   Temp 98 °F (36.7 °C) (Oral)   Resp 14   Ht 5' 9\" (1.753 m)   Wt 154 lb 12.8 oz (70.2 kg)   SpO2 100%   BMI 22.86 kg/m²     Body mass index is 22.86 kg/m². General:   alert, cooperative and no distress   Eyes: conjunctivae/sclerae clear. PERRL, EOM's intact   Mouth:  No oral lesions, no pharyngeal erythema, no exudates   Neck: Trachea midline, no thyromegaly, no bruits   Heart: S1 and S2 normal,no murmurs noted    Lungs: Clear to auscultation bilaterally, no increased work of breathing   Abdomen: Soft, nontender.   Normal bowel sounds   Extremities: No edema or cyanosis   Neuro: ..alert, oriented x3,speech normal in context and clarity, cranial nerves II-XII intact,motor strength: full proximally and distally,gait: normal  reflexes: full and symmetric     Physical exam otherwise negative         Assessment/Plan:     Diagnoses and all orders for this visit:    Primary hypertension  -     COLLECTION VENOUS BLOOD,VENIPUNCTURE  -     CBC WITH AUTOMATED DIFF; Future  -     METABOLIC PANEL, COMPREHENSIVE; Future  -     TSH 3RD GENERATION; Future  -     URINALYSIS W/ REFLEX CULTURE; Future    Type 2 diabetes mellitus without complication, without long-term current use of insulin (HCC)  -     HEMOGLOBIN A1C WITH EAG; Future  -     LIPID PANEL; Future  -     MICROALBUMIN, UR, RAND W/ MICROALB/CREAT RATIO; Future    Vitamin D deficiency  -     VITAMIN D, 25 HYDROXY; Future    Drug-induced polyneuropathy ([address]    GERD with stricture        Other instructions: The patient's medications were reviewed and reconciled. No change in his current medical regimen will be made. A prudent diet with no added salt is endorsed    Continue to check blood sugars once daily    Await results of multiple labs    Follow-up for Medicare wellness check in 1 month    Follow-up and Dispositions    · Return in about 6 months (around 9/1/2022). Beba Rodriguez MD    Please note that this dictation was completed with [employer], the computer voice recognition software. Quite often unanticipated grammatical, syntax, homophones, and other interpretive errors are inadvertently transcribed by the computer software. Please disregard these errors. Please excuse any errors that have escaped final proofreading.

## 2022-03-02 LAB
25(OH)D3 SERPL-MCNC: 36.5 NG/ML (ref 30–100)
ALBUMIN SERPL-MCNC: 4 G/DL (ref 3.5–5)
ALBUMIN/GLOB SERPL: 1.1 {RATIO} (ref 1.1–2.2)
ALP SERPL-CCNC: 98 U/L (ref 45–117)
ALT SERPL-CCNC: 20 U/L (ref 12–78)
ANION GAP SERPL CALC-SCNC: 3 MMOL/L (ref 5–15)
APPEARANCE UR: CLEAR
AST SERPL-CCNC: 25 U/L (ref 15–37)
BACTERIA URNS QL MICRO: NEGATIVE /HPF
BASOPHILS # BLD: 0.1 K/UL (ref 0–0.1)
BASOPHILS NFR BLD: 1 % (ref 0–1)
BILIRUB SERPL-MCNC: 0.4 MG/DL (ref 0.2–1)
BILIRUB UR QL: NEGATIVE
BUN SERPL-MCNC: 24 MG/DL (ref 6–20)
BUN/CREAT SERPL: 19 (ref 12–20)
CALCIUM SERPL-MCNC: 10.4 MG/DL (ref 8.5–10.1)
CHLORIDE SERPL-SCNC: 105 MMOL/L (ref 97–108)
CHOLEST SERPL-MCNC: 154 MG/DL
CO2 SERPL-SCNC: 28 MMOL/L (ref 21–32)
COLOR UR: NORMAL
CREAT SERPL-MCNC: 1.25 MG/DL (ref 0.7–1.3)
CREAT UR-MCNC: 87.5 MG/DL
DIFFERENTIAL METHOD BLD: NORMAL
EOSINOPHIL # BLD: 0.1 K/UL (ref 0–0.4)
EOSINOPHIL NFR BLD: 1 % (ref 0–7)
EPITH CASTS URNS QL MICRO: NORMAL /LPF
ERYTHROCYTE [DISTWIDTH] IN BLOOD BY AUTOMATED COUNT: 13.6 % (ref 11.5–14.5)
EST. AVERAGE GLUCOSE BLD GHB EST-MCNC: 126 MG/DL
GLOBULIN SER CALC-MCNC: 3.6 G/DL (ref 2–4)
GLUCOSE SERPL-MCNC: 96 MG/DL (ref 65–100)
GLUCOSE UR STRIP.AUTO-MCNC: NEGATIVE MG/DL
HBA1C MFR BLD: 6 % (ref 4–5.6)
HCT VFR BLD AUTO: 49.6 % (ref 36.6–50.3)
HDLC SERPL-MCNC: 49 MG/DL
HDLC SERPL: 3.1 {RATIO} (ref 0–5)
HGB BLD-MCNC: 15.7 G/DL (ref 12.1–17)
HGB UR QL STRIP: NEGATIVE
HYALINE CASTS URNS QL MICRO: NORMAL /LPF (ref 0–5)
IMM GRANULOCYTES # BLD AUTO: 0 K/UL (ref 0–0.04)
IMM GRANULOCYTES NFR BLD AUTO: 0 % (ref 0–0.5)
KETONES UR QL STRIP.AUTO: NEGATIVE MG/DL
LDLC SERPL CALC-MCNC: 87.8 MG/DL (ref 0–100)
LEUKOCYTE ESTERASE UR QL STRIP.AUTO: NEGATIVE
LYMPHOCYTES # BLD: 2.4 K/UL (ref 0.8–3.5)
LYMPHOCYTES NFR BLD: 34 % (ref 12–49)
MCH RBC QN AUTO: 28.9 PG (ref 26–34)
MCHC RBC AUTO-ENTMCNC: 31.7 G/DL (ref 30–36.5)
MCV RBC AUTO: 91.3 FL (ref 80–99)
MICROALBUMIN UR-MCNC: 1 MG/DL
MICROALBUMIN/CREAT UR-RTO: 11 MG/G (ref 0–30)
MONOCYTES # BLD: 0.5 K/UL (ref 0–1)
MONOCYTES NFR BLD: 8 % (ref 5–13)
NEUTS SEG # BLD: 3.8 K/UL (ref 1.8–8)
NEUTS SEG NFR BLD: 56 % (ref 32–75)
NITRITE UR QL STRIP.AUTO: NEGATIVE
NRBC # BLD: 0 K/UL (ref 0–0.01)
NRBC BLD-RTO: 0 PER 100 WBC
PH UR STRIP: 5 [PH] (ref 5–8)
PLATELET # BLD AUTO: 181 K/UL (ref 150–400)
PMV BLD AUTO: 11.2 FL (ref 8.9–12.9)
POTASSIUM SERPL-SCNC: 4.7 MMOL/L (ref 3.5–5.1)
PROT SERPL-MCNC: 7.6 G/DL (ref 6.4–8.2)
PROT UR STRIP-MCNC: NEGATIVE MG/DL
RBC # BLD AUTO: 5.43 M/UL (ref 4.1–5.7)
RBC #/AREA URNS HPF: NORMAL /HPF (ref 0–5)
SODIUM SERPL-SCNC: 136 MMOL/L (ref 136–145)
SP GR UR REFRACTOMETRY: 1.01 (ref 1–1.03)
TRIGL SERPL-MCNC: 86 MG/DL (ref ?–150)
TSH SERPL DL<=0.05 MIU/L-ACNC: 1.3 UIU/ML (ref 0.36–3.74)
UA: UC IF INDICATED,UAUC: NORMAL
UROBILINOGEN UR QL STRIP.AUTO: 0.2 EU/DL (ref 0.2–1)
VLDLC SERPL CALC-MCNC: 17.2 MG/DL
WBC # BLD AUTO: 6.9 K/UL (ref 4.1–11.1)
WBC URNS QL MICRO: NORMAL /HPF (ref 0–4)

## 2022-03-19 PROBLEM — N18.30 STAGE 3 CHRONIC KIDNEY DISEASE (HCC): Status: ACTIVE | Noted: 2019-04-29

## 2022-03-19 PROBLEM — I10 HYPERTENSION: Status: ACTIVE | Noted: 2018-01-16

## 2022-03-19 PROBLEM — G62.0 DRUG-INDUCED POLYNEUROPATHY (HCC): Status: ACTIVE | Noted: 2018-01-16

## 2022-03-19 PROBLEM — K22.2 GERD WITH STRICTURE: Status: ACTIVE | Noted: 2018-01-16

## 2022-03-19 PROBLEM — N40.0 BPH WITHOUT OBSTRUCTION/LOWER URINARY TRACT SYMPTOMS: Status: ACTIVE | Noted: 2018-01-16

## 2022-03-19 PROBLEM — K21.9 GERD WITH STRICTURE: Status: ACTIVE | Noted: 2018-01-16

## 2022-03-19 PROBLEM — E55.9 VITAMIN D DEFICIENCY: Status: ACTIVE | Noted: 2018-01-16

## 2022-03-19 PROBLEM — K57.90 DIVERTICULOSIS: Status: ACTIVE | Noted: 2018-01-16

## 2022-03-20 PROBLEM — J38.01 UNILATERAL PARTIAL PARALYSIS OF VOCAL CORDS OR LARYNX: Status: ACTIVE | Noted: 2018-01-16

## 2022-03-20 PROBLEM — E87.5 HYPERKALEMIA: Status: ACTIVE | Noted: 2019-04-29

## 2022-03-29 ENCOUNTER — OFFICE VISIT (OUTPATIENT)
Dept: INTERNAL MEDICINE CLINIC | Age: 82
End: 2022-03-29
Payer: MEDICARE

## 2022-03-29 VITALS
OXYGEN SATURATION: 98 % | TEMPERATURE: 97.6 F | SYSTOLIC BLOOD PRESSURE: 110 MMHG | HEIGHT: 69 IN | HEART RATE: 80 BPM | RESPIRATION RATE: 16 BRPM | DIASTOLIC BLOOD PRESSURE: 70 MMHG | BODY MASS INDEX: 23.31 KG/M2 | WEIGHT: 157.4 LBS

## 2022-03-29 DIAGNOSIS — Z00.00 MEDICARE ANNUAL WELLNESS VISIT, SUBSEQUENT: Primary | ICD-10-CM

## 2022-03-29 PROCEDURE — 1101F PT FALLS ASSESS-DOCD LE1/YR: CPT | Performed by: INTERNAL MEDICINE

## 2022-03-29 PROCEDURE — G8510 SCR DEP NEG, NO PLAN REQD: HCPCS | Performed by: INTERNAL MEDICINE

## 2022-03-29 PROCEDURE — G0439 PPPS, SUBSEQ VISIT: HCPCS | Performed by: INTERNAL MEDICINE

## 2022-03-29 PROCEDURE — G8752 SYS BP LESS 140: HCPCS | Performed by: INTERNAL MEDICINE

## 2022-03-29 PROCEDURE — G8427 DOCREV CUR MEDS BY ELIG CLIN: HCPCS | Performed by: INTERNAL MEDICINE

## 2022-03-29 PROCEDURE — G8420 CALC BMI NORM PARAMETERS: HCPCS | Performed by: INTERNAL MEDICINE

## 2022-03-29 PROCEDURE — G8536 NO DOC ELDER MAL SCRN: HCPCS | Performed by: INTERNAL MEDICINE

## 2022-03-29 PROCEDURE — G8754 DIAS BP LESS 90: HCPCS | Performed by: INTERNAL MEDICINE

## 2022-03-29 NOTE — PROGRESS NOTES
Yeny Stacy is a 80 y.o. male and presents with Annual Wellness Visit  Patient denies any new problems today. .    Current Outpatient Medications   Medication Sig Dispense Refill    hydroCHLOROthiazide (HYDRODIURIL) 12.5 mg tablet TAKE 1 TABLET EVERY OTHER DAY 45 Tablet 0    cholecalciferol, vitamin D3, (VITAMIN D3) 50 mcg (2,000 unit) tab Take 1 Tab by mouth daily.  lisinopril (PRINIVIL, ZESTRIL) 20 mg tablet Take 20 mg by mouth daily.  esomeprazole (NEXIUM) 40 mg capsule TAKE 1 CAPSULE DAILY 90 Cap 1    folic acid-vit K9-PAX P61 (FOLGARD RX) 2.2-25-1 mg tab Take 1 Tab by mouth daily.  VITAMINS A AND D PO Take 1 Cap by mouth daily.  sildenafil citrate (VIAGRA) 50 mg tablet Take 50 mg by mouth as needed.  CHOLESTYRAMINE/ASPARTAME (PREVALITE PO) Take 5.5 g by mouth every evening.  CYANOCOBALAMIN (VITAMIN B-12 PO) Take 1,000 mg by mouth daily.  pyridoxine (VITAMIN B-6) 100 mg tablet Take 100 mg by mouth daily.  MULTIVITAMINS (MULTIVITAMIN PO) Take 1 Tablet by mouth daily. Health Maintenance   Topic Date Due    Foot Exam Q1  08/13/2020    Eye Exam Retinal or Dilated  03/21/2021    Flu Vaccine (1) 09/01/2021    A1C test (Diabetic or Prediabetic)  09/01/2022    Depression Screen  03/01/2023    MICROALBUMIN Q1  03/01/2023    Lipid Screen  03/01/2023    Medicare Yearly Exam  03/30/2023    DTaP/Tdap/Td series (2 - Td or Tdap) 02/05/2029    Shingrix Vaccine Age 50>  Completed    COVID-19 Vaccine  Completed    Pneumococcal 65+ years  Completed        Objective:  Visit Vitals  /70 (BP 1 Location: Left arm, BP Patient Position: Sitting)   Pulse 80   Temp 97.6 °F (36.4 °C) (Oral)   Resp 16   Ht 5' 9\" (1.753 m)   Wt 157 lb 6.4 oz (71.4 kg)   SpO2 98%   BMI 23.24 kg/m²     Body mass index is 23.24 kg/m².     This is a Subsequent Medicare Annual Wellness Exam (AWV) (Performed 12 months after IPPE or effective date of Medicare Part B enrollment)    I have reviewed the patient's medical history in detail and updated the computerized patient record. Problem list reviewed with patient and risk factors discussed. PSH, SH, FH, Medications and HM issues also reviewed and discussed. Depression screen, fall risk assessment, functional abilities and ACP also reviewed and discussed as above and below. Depression Risk Factor Screening:     3 most recent PHQ Screens 3/29/2022   Little interest or pleasure in doing things Not at all   Feeling down, depressed, irritable, or hopeless Not at all   Total Score PHQ 2 0     Alcohol Risk Factor Screening: You do not drink alcohol or very rarely. Functional Ability and Level of Safety:   Hearing Loss  Hearing is good. Activities of Daily Living  The home contains: handrails and grab bars  Patient does total self care    Fall Risk  Fall Risk Assessment, last 12 mths 3/29/2022   Able to walk? Yes   Fall in past 12 months? 0   Do you feel unsteady? 1   Are you worried about falling 0       Abuse Screen  Patient is not abused    Cognitive Screening   Evaluation of Cognitive Function:  Has your family/caregiver stated any concerns about your memory: no  Normal    Patient Care Team   Patient Care Team:  Jesenia Daniel MD as PCP - General (Internal Medicine)  Jesenia Daniel MD as PCP - [facility] Empaneled Provider  Robert Kirkpatrick MD (Gastroenterology)    Assessment/Plan   Education and counseling provided:  Are appropriate based on today's review and evaluation    Assessment/Plan:   Impressions:      ICD-10-CM ICD-9-CM    1. Medicare annual wellness visit, subsequent  Z00.00 V70.0         Plan:  1. Continue present meds  2. Lifestyle modifications including Na restriction, low carb/fat diet, weight reduction and exercise (at least a walking program). Follow-up and Dispositions    · Return in about 6 months (around 9/29/2022). No orders of the defined types were placed in this encounter.       Jose TALAMANTES Juan Ramon Desai MD   Assessment/Plan:  Diagnoses and all orders for this visit:    Medicare annual wellness visit, subsequent        Health Maintenance Due   Topic Date Due    Foot Exam Q1  08/13/2020    Eye Exam Retinal or Dilated  03/21/2021    Flu Vaccine (1) 09/01/2021       Other instructions: The patient's medications were reviewed and reconciled. A prudent diet and exercise is encouraged    Health maintenance issues were reviewed and influenza vaccination date was updated. Patient states that he has a yearly eye examination and states that his last diabetic exam for this was normal.    He is up-to-date on all vaccinations. Labs were done on 3/1 and A1c was 6.0. LDL was 88. Follow-up 6 months    Follow-up and Dispositions    · Return in about 6 months (around 9/29/2022). I have reviewed with the patient details of the assessment and plan and all questions were answered. Relevent patient education was performed. The most recent lab findings were reviewed with the patient. An After Visit Summary was printed and given to the patient. Magaly Raymond MD    Please note that this dictation was completed with [employer], the computer voice recognition software. Quite often unanticipated grammatical, syntax, homophones, and other interpretive errors are inadvertently transcribed by the computer software. Please disregard these errors. Please excuse any errors that have escaped final proofreading.

## 2022-03-29 NOTE — PROGRESS NOTES
Chief Complaint   Patient presents with   [facility] Annual Wellness Visit       1. \"Have you been to the ER, urgent care clinic since your last visit? Hospitalized since your last visit? \" No    2. \"Have you seen or consulted any other health care providers outside of the [address] [place] [address] since your last visit? \" No     3. For patients aged 39-70: Has the patient had a colonoscopy / FIT/ Cologuard? NA - based on age      If the patient is female:    4. For patients aged 41-77: Has the patient had a mammogram within the past 2 years? NA - based on age or sex      11. For patients aged 21-65: Has the patient had a pap smear?  NA - based on age or sex      Visit Vitals  /70 (BP 1 Location: Left arm, BP Patient Position: Sitting)   Pulse 80   Temp 97.6 °F (36.4 °C) (Oral)   Resp 16   Ht 5' 9\" (1.753 m)   Wt 157 lb 6.4 oz (71.4 kg)   SpO2 98%   BMI 23.24 kg/m²

## 2022-03-29 NOTE — PATIENT INSTRUCTIONS
DASH Diet: Care Instructions  Your Care Instructions     The DASH diet is an eating plan that can help lower your blood pressure. DASH stands for Dietary Approaches to Stop Hypertension. Hypertension is high blood pressure. The DASH diet focuses on eating foods that are high in calcium, potassium, and magnesium. These nutrients can lower blood pressure. The foods that are highest in these nutrients are fruits, vegetables, low-fat dairy products, nuts, seeds, and legumes. But taking calcium, potassium, and magnesium supplements instead of eating foods that are high in those nutrients does not have the same effect. The DASH diet also includes whole grains, fish, and poultry. The DASH diet is one of several lifestyle changes your doctor may recommend to lower your high blood pressure. Your doctor may also want you to decrease the amount of sodium in your diet. Lowering sodium while following the DASH diet can lower blood pressure even further than just the DASH diet alone. Follow-up care is a key part of your treatment and safety. Be sure to make and go to all appointments, and call your doctor if you are having problems. It's also a good idea to know your test results and keep a list of the medicines you take. How can you care for yourself at home? Following the DASH diet  · Eat 4 to 5 servings of fruit each day. A serving is 1 medium-sized piece of fruit, ½ cup chopped or canned fruit, 1/4 cup dried fruit, or 4 ounces (½ cup) of fruit juice. Choose fruit more often than fruit juice. · Eat 4 to 5 servings of vegetables each day. A serving is 1 cup of lettuce or raw leafy vegetables, ½ cup of chopped or cooked vegetables, or 4 ounces (½ cup) of vegetable juice. Choose vegetables more often than vegetable juice. · Get 2 to 3 servings of low-fat and fat-free dairy each day. A serving is 8 ounces of milk, 1 cup of yogurt, or 1 ½ ounces of cheese. · Eat 6 to 8 servings of grains each day.  A serving is 1 slice of bread, 1 ounce of dry cereal, or ½ cup of cooked rice, pasta, or cooked cereal. Try to choose whole-grain products as much as possible. · Limit lean meat, poultry, and fish to 2 servings each day. A serving is 3 ounces, about the size of a deck of cards. · Eat 4 to 5 servings of nuts, seeds, and legumes (cooked dried beans, lentils, and split peas) each week. A serving is 1/3 cup of nuts, 2 tablespoons of seeds, or ½ cup of cooked beans or peas. · Limit fats and oils to 2 to 3 servings each day. A serving is 1 teaspoon of vegetable oil or 2 tablespoons of salad dressing. · Limit sweets and added sugars to 5 servings or less a week. A serving is 1 tablespoon jelly or jam, ½ cup sorbet, or 1 cup of lemonade. · Eat less than 2,300 milligrams (mg) of sodium a day. If you limit your sodium to 1,500 mg a day, you can lower your blood pressure even more. · Be aware that all of these are the suggested number of servings for people who eat 1,800 to 2,000 calories a day. Your recommended number of servings may be different if you need more or fewer calories. Tips for success  · Start small. Do not try to make dramatic changes to your diet all at once. You might feel that you are missing out on your favorite foods and then be more likely to not follow the plan. Make small changes, and stick with them. Once those changes become habit, add a few more changes. · Try some of the following:  ? Make it a goal to eat a fruit or vegetable at every meal and at snacks. This will make it easy to get the recommended amount of fruits and vegetables each day. ? Try yogurt topped with fruit and nuts for a snack or healthy dessert. ? Add lettuce, tomato, cucumber, and onion to sandwiches. ? Combine a ready-made pizza crust with low-fat mozzarella cheese and lots of vegetable toppings. Try using tomatoes, squash, spinach, broccoli, carrots, cauliflower, and onions. ?  Have a variety of cut-up vegetables with a low-fat dip as an appetizer instead of chips and dip. ? Sprinkle sunflower seeds or chopped almonds over salads. Or try adding chopped walnuts or almonds to cooked vegetables. ? Try some vegetarian meals using beans and peas. Add garbanzo or kidney beans to salads. Make burritos and tacos with mashed cisse beans or black beans. Where can you learn more? Go to http://pavan-erna.info/  Enter H967 in the search box to learn more about \"DASH Diet: Care Instructions. \"  Current as of: January 10, 2022               Content Version: 13.2  © 2340-9406 [employer]. Care instructions adapted under license by [employer] (which disclaims liability or warranty for this information). If you have questions about a medical condition or this instruction, always ask your healthcare professional. [address] any warranty or liability for your use of this information.

## 2022-05-04 ENCOUNTER — HOSPITAL ENCOUNTER (OUTPATIENT)
Dept: GENERAL RADIOLOGY | Age: 82
Discharge: HOME | End: 2022-05-04
Payer: MEDICARE

## 2022-05-04 ENCOUNTER — TRANSCRIBE ORDER (OUTPATIENT)
Dept: REGISTRATION | Age: 82
End: 2022-05-04

## 2022-05-04 ENCOUNTER — OFFICE VISIT (OUTPATIENT)
Dept: INTERNAL MEDICINE CLINIC | Age: 82
End: 2022-05-04

## 2022-05-04 VITALS
TEMPERATURE: 98 F | SYSTOLIC BLOOD PRESSURE: 116 MMHG | HEIGHT: 69 IN | BODY MASS INDEX: 22.66 KG/M2 | DIASTOLIC BLOOD PRESSURE: 70 MMHG | HEART RATE: 97 BPM | RESPIRATION RATE: 16 BRPM | WEIGHT: 153 LBS | OXYGEN SATURATION: 97 %

## 2022-05-04 DIAGNOSIS — E11.22 TYPE 2 DIABETES MELLITUS WITH STAGE 3 CHRONIC KIDNEY DISEASE, WITHOUT LONG-TERM CURRENT USE OF INSULIN, UNSPECIFIED WHETHER STAGE 3A OR 3B CKD (HCC): ICD-10-CM

## 2022-05-04 DIAGNOSIS — M54.9 BACK PAIN: ICD-10-CM

## 2022-05-04 DIAGNOSIS — R06.89 DECREASED BREATH SOUNDS AT LEFT LUNG BASE: Primary | ICD-10-CM

## 2022-05-04 DIAGNOSIS — R06.09 DYSPNEA ON EXERTION: ICD-10-CM

## 2022-05-04 DIAGNOSIS — E11.9 TYPE 2 DIABETES MELLITUS WITHOUT COMPLICATION, WITHOUT LONG-TERM CURRENT USE OF INSULIN (HCC): ICD-10-CM

## 2022-05-04 DIAGNOSIS — J90 PLEURAL EFFUSION ON RIGHT: ICD-10-CM

## 2022-05-04 DIAGNOSIS — N18.30 STAGE 3 CHRONIC KIDNEY DISEASE, UNSPECIFIED WHETHER STAGE 3A OR 3B CKD (HCC): ICD-10-CM

## 2022-05-04 DIAGNOSIS — N18.30 TYPE 2 DIABETES MELLITUS WITH STAGE 3 CHRONIC KIDNEY DISEASE, WITHOUT LONG-TERM CURRENT USE OF INSULIN, UNSPECIFIED WHETHER STAGE 3A OR 3B CKD (HCC): ICD-10-CM

## 2022-05-04 DIAGNOSIS — R06.00 DYSPNEA: ICD-10-CM

## 2022-05-04 DIAGNOSIS — M54.6 THORACIC BACK PAIN, UNSPECIFIED BACK PAIN LATERALITY, UNSPECIFIED CHRONICITY: ICD-10-CM

## 2022-05-04 DIAGNOSIS — I10 PRIMARY HYPERTENSION: Primary | ICD-10-CM

## 2022-05-04 DIAGNOSIS — R06.89 DECREASED BREATH SOUNDS AT LEFT LUNG BASE: ICD-10-CM

## 2022-05-04 PROCEDURE — G8427 DOCREV CUR MEDS BY ELIG CLIN: HCPCS | Performed by: INTERNAL MEDICINE

## 2022-05-04 PROCEDURE — 1101F PT FALLS ASSESS-DOCD LE1/YR: CPT | Performed by: INTERNAL MEDICINE

## 2022-05-04 PROCEDURE — 3044F HG A1C LEVEL LT 7.0%: CPT | Performed by: INTERNAL MEDICINE

## 2022-05-04 PROCEDURE — 71046 X-RAY EXAM CHEST 2 VIEWS: CPT

## 2022-05-04 PROCEDURE — G8752 SYS BP LESS 140: HCPCS | Performed by: INTERNAL MEDICINE

## 2022-05-04 PROCEDURE — G8420 CALC BMI NORM PARAMETERS: HCPCS | Performed by: INTERNAL MEDICINE

## 2022-05-04 PROCEDURE — G8432 DEP SCR NOT DOC, RNG: HCPCS | Performed by: INTERNAL MEDICINE

## 2022-05-04 PROCEDURE — G8536 NO DOC ELDER MAL SCRN: HCPCS | Performed by: INTERNAL MEDICINE

## 2022-05-04 PROCEDURE — G8754 DIAS BP LESS 90: HCPCS | Performed by: INTERNAL MEDICINE

## 2022-05-04 PROCEDURE — 99213 OFFICE O/P EST LOW 20 MIN: CPT | Performed by: INTERNAL MEDICINE

## 2022-05-04 NOTE — PROGRESS NOTES
Cora Silverman is a 80 y.o. male presenting for Shortness of Breath  . 1. Have you been to the ER, urgent care clinic since your last visit? Hospitalized since your last visit? No    2. Have you seen or consulted any other health care providers outside of the [address] [place] [address] since your last visit? Include any pap smears or colon screening. No    Fall Risk Assessment, last 12 mths 3/29/2022   Able to walk? Yes   Fall in past 12 months? 0   Do you feel unsteady? 1   Are you worried about falling 0         Abuse Screening Questionnaire 3/29/2022   Do you ever feel afraid of your partner? N   Are you in a relationship with someone who physically or mentally threatens you? N   Is it safe for you to go home? Y       3 most recent PHQ Screens 3/29/2022   Little interest or pleasure in doing things Not at all   Feeling down, depressed, irritable, or hopeless Not at all   Total Score PHQ 2 0       There are no discontinued medications.

## 2022-05-04 NOTE — PROGRESS NOTES
Felix Hare is a 80 y.o. male and presents with Shortness of Breath  . Subjective:  Mr. Lattie Cranker presents today with complaint of shortness of breath which has been rather sudden and persistent over the past month. He states that all of a sudden he has felt very fatigued and short of breath with minimal exertion. He has some discomfort in his back. He denies chest pain or palpitations. He has no PND orthopnea or pedal edema. His history is significant for hypertension, diabetes which has been diet controlled, chronic renal insufficiency, remote history of esophageal cancer, history of chemotherapy and chemotherapy induced neuropathy.     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    Esophageal cancer ([facility] 2008    tx esophagectomy, chemo, radiation (complete 2014)    GERD (gastroesophageal reflux disease)     GERD with stricture 1/16/2018    Status post dilatation    Hx of colonic polyps     Hyperkalemia 4/29/2019    Hypertension     Kidney cyst     CHAVEZ (nonalcoholic steatohepatitis) 2000    \"fatty liver\" diet related    Personal history of colonic polyps 12/6/2012    Stage 3 chronic kidney disease ([facility] 4/29/2019    Thrombocytopenia (HCC)     r/t chemo    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    Vitamin D deficiency 1/16/2018     Past Surgical History:   Procedure Laterality Date    COLONOSCOPY N/A 6/23/2016    COLONOSCOPY performed by Guevara Layne MD at [facility] ENDOSCOPY    COLONOSCOPY N/A 7/2/2021    COLONOSCOPY performed by Travis Holguin MD at [address] [place] [address]  7/2/2021         HX CATARACT REMOVAL Bilateral     HX CHOLECYSTECTOMY  9/29/11    HX ESOPHAGECTOMY  2008    HX HEMORRHOIDECTOMY  HX HERNIA REPAIR      HX ROTATOR CUFF REPAIR Right 2014    HX VASCULAR ACCESS Right     port placed and removed in 2014    TN COLSC FLX W/RMVL OF TUMOR POLYP LESION SNARE TQ  2010         TN EGD BALLOON DILATION ESOPHAGUS <30 MM DIAM  3/3/2011         TN EGD BALLOON DILATION ESOPHAGUS <30 MM DIAM  2012         TN EGD TRANSORAL BIOPSY SINGLE/MULTIPLE  3/29/2012         UPPER GI ENDOSCOPY,BALL DIL,30MM  2018         UPPER GI ENDOSCOPY,BALL DIL,30MM  2020          No Known Allergies  Current Outpatient Medications   Medication Sig Dispense Refill    hydroCHLOROthiazide (HYDRODIURIL) 12.5 mg tablet TAKE 1 TABLET EVERY OTHER DAY 45 Tablet 0    cholecalciferol, vitamin D3, (VITAMIN D3) 50 mcg (2,000 unit) tab Take 1 Tab by mouth daily.  lisinopril (PRINIVIL, ZESTRIL) 20 mg tablet Take 20 mg by mouth daily.  esomeprazole (NEXIUM) 40 mg capsule TAKE 1 CAPSULE DAILY 90 Cap 1    folic acid-vit V3-UIP J04 (FOLGARD RX) 2.2-25-1 mg tab Take 1 Tab by mouth daily.  VITAMINS A AND D PO Take 1 Cap by mouth daily.  sildenafil citrate (VIAGRA) 50 mg tablet Take 50 mg by mouth as needed.  CHOLESTYRAMINE/ASPARTAME (PREVALITE PO) Take 5.5 g by mouth every evening.  CYANOCOBALAMIN (VITAMIN B-12 PO) Take 1,000 mg by mouth daily.  pyridoxine (VITAMIN B-6) 100 mg tablet Take 100 mg by mouth daily.  MULTIVITAMINS (MULTIVITAMIN PO) Take 1 Tablet by mouth daily.        Social History     Socioeconomic History    Marital status:    Tobacco Use    Smoking status: Former Smoker     Packs/day: 2.00     Years: 25.00     Pack years: 50.00     Quit date: 1987     Years since quittin.3    Smokeless tobacco: Never Used   Vaping Use    Vaping Use: Never used   Substance and Sexual Activity    Alcohol use: No    Drug use: No     Family History   Problem Relation Age of Onset    Diabetes Mother     OSTEOARTHRITIS Father     Cancer Sister         lung    Cancer Sister         breast       Review of Systems  Constitutional:  negative for fevers, chills, anorexia and weight loss  Eyes:    negative for visual disturbance and irritation  ENT:    negative for tinnitus,sore throat,nasal congestion,ear pains. hoarseness  Respiratory:     negative for cough, hemoptysis, wheezing  CV:    negative for chest pain, palpitations, lower extremity edema  GI:    negative for nausea, vomiting, diarrhea, abdominal pain,melena  Endo:               negative for polyuria,polydipsia,polyphagia,heat intolerance  Genitourinary : negative for frequency, dysuria and hematuria  Integumentary: negative for rash and pruritus  Hematologic:   negative for easy bruising and gum/nose bleeding  Musculoskel:  negative for myalgias, arthralgias, back pain, muscle weakness, joint pain  Neurological:   negative for headaches, dizziness, vertigo, memory problems and gait   Behavl/Psych:  negative for feelings of anxiety, depression, mood changes  ROS otherwise negative      Objective:  Visit Vitals  /70 (BP 1 Location: Right upper arm, BP Patient Position: Sitting, BP Cuff Size: Adult)   Pulse 97   Temp 98 °F (36.7 °C) (Oral)   Resp 16   Ht 5' 9\" (1.753 m)   Wt 153 lb (69.4 kg)   SpO2 97%   BMI 22.59 kg/m²     Physical Exam:   General appearance - alert, well appearing, and in no distress  Mental status - alert, oriented to person, place, and time  EYE-LUÍS, EOMI, fundi normal, corneas normal, no foreign bodies  ENT-ENT exam normal, no neck nodes or sinus tenderness  Nose - normal and patent, no erythema, discharge or polyps  Mouth - mucous membranes moist, pharynx normal without lesions  Neck - supple, no significant adenopathy   Chest -decreased breath sounds right base no rales or rhonchi  Heart - normal rate, regular rhythm, normal S1, S2, no murmurs, rubs, clicks or gallops   Abdomen - soft, nontender, nondistended, no masses or organomegaly  Lymph- no adenopathy palpable  Ext-peripheral pulses normal, no pedal edema, no clubbing or cyanosis  Skin-Warm and dry. no hyperpigmentation, vitiligo, or suspicious lesions  Neuro -alert, oriented, normal speech, no focal findings or movement disorder noted      Assessment/Plan:  Diagnoses and all orders for this visit:    1. Primary hypertension  -     CBC WITH AUTOMATED DIFF; Future  -     METABOLIC PANEL, COMPREHENSIVE; Future    2. Type 2 diabetes mellitus without complication, without long-term current use of insulin (HCC)  -     CBC WITH AUTOMATED DIFF; Future  -     METABOLIC PANEL, COMPREHENSIVE; Future    3. Stage 3 chronic kidney disease, unspecified whether stage 3a or 3b CKD ([facility]  -     CBC WITH AUTOMATED DIFF; Future  -     METABOLIC PANEL, COMPREHENSIVE; Future    4. Dyspnea on exertion  -     CBC WITH AUTOMATED DIFF; Future  -     METABOLIC PANEL, COMPREHENSIVE; Future  -     XR CHEST PA LAT; Future    5. Type 2 diabetes mellitus with stage 3 chronic kidney disease, without long-term current use of insulin, unspecified whether stage 3a or 3b CKD ([facility]    6. Thoracic back pain, unspecified back pain laterality, unspecified chronicity  -     XR CHEST PA LAT; Future          ICD-10-CM ICD-9-CM    1. Primary hypertension  I10 401.9 CBC WITH AUTOMATED DIFF      METABOLIC PANEL, COMPREHENSIVE   2. Type 2 diabetes mellitus without complication, without long-term current use of insulin (HCC)  E11.9 250.00 CBC WITH AUTOMATED DIFF      METABOLIC PANEL, COMPREHENSIVE   3. Stage 3 chronic kidney disease, unspecified whether stage 3a or 3b CKD (HCC)  N18.30 585.3 CBC WITH AUTOMATED DIFF      METABOLIC PANEL, COMPREHENSIVE   4. Dyspnea on exertion  R06.00 786.09 CBC WITH AUTOMATED DIFF      METABOLIC PANEL, COMPREHENSIVE      XR CHEST PA LAT   5. Type 2 diabetes mellitus with stage 3 chronic kidney disease, without long-term current use of insulin, unspecified whether stage 3a or 3b CKD (HCC)  E11.22 250.40     N18.30 585.3    6.  Thoracic back pain, unspecified back pain laterality, unspecified chronicity  M54.6 724.1 XR CHEST PA LAT     Plan:    I am concerned about the patient's fatigability and dyspnea on exertion with back pain. His exam is remarkable for right lower lobe decreased breath sounds concerning for pleural effusion. He will be referred for a chest x-ray and labs today. May consider interventional radiology scheduled thoracentesis and/or CT scan if indicated. I have reviewed with the patient details of the assessment and plan and all questions were answered. Relevent patient education was performed. Verbal and/or written instructions (see AVS) provided. The most recent lab findings were reviewed with the patient. Plan was discussed with patient who verbally expressed understanding. An After Visit Summary was printed and given to the patient.     Veronica Ross MD

## 2022-05-04 NOTE — PROGRESS NOTES
Please notify patient that there is fluid at the base of the right lung. A CT scan of the chest will be ordered for further evaluation.

## 2022-05-05 ENCOUNTER — TRANSCRIBE ORDER (OUTPATIENT)
Dept: SCHEDULING | Age: 82
End: 2022-05-05

## 2022-05-05 DIAGNOSIS — M54.6 THORACIC BACK PAIN: Primary | ICD-10-CM

## 2022-05-05 DIAGNOSIS — J90 PLEURAL EFFUSION: ICD-10-CM

## 2022-05-05 LAB
ALBUMIN SERPL-MCNC: 3.2 G/DL (ref 3.5–5)
ALBUMIN/GLOB SERPL: 0.9 {RATIO} (ref 1.1–2.2)
ALP SERPL-CCNC: 101 U/L (ref 45–117)
ALT SERPL-CCNC: 20 U/L (ref 12–78)
ANION GAP SERPL CALC-SCNC: 7 MMOL/L (ref 5–15)
AST SERPL-CCNC: 30 U/L (ref 15–37)
BASOPHILS # BLD: 0.1 K/UL (ref 0–0.1)
BASOPHILS NFR BLD: 1 % (ref 0–1)
BILIRUB SERPL-MCNC: 0.4 MG/DL (ref 0.2–1)
BUN SERPL-MCNC: 17 MG/DL (ref 6–20)
BUN/CREAT SERPL: 15 (ref 12–20)
CALCIUM SERPL-MCNC: 9.3 MG/DL (ref 8.5–10.1)
CHLORIDE SERPL-SCNC: 102 MMOL/L (ref 97–108)
CO2 SERPL-SCNC: 27 MMOL/L (ref 21–32)
CREAT SERPL-MCNC: 1.13 MG/DL (ref 0.7–1.3)
DIFFERENTIAL METHOD BLD: NORMAL
EOSINOPHIL # BLD: 0.1 K/UL (ref 0–0.4)
EOSINOPHIL NFR BLD: 1 % (ref 0–7)
ERYTHROCYTE [DISTWIDTH] IN BLOOD BY AUTOMATED COUNT: 13.7 % (ref 11.5–14.5)
GLOBULIN SER CALC-MCNC: 3.5 G/DL (ref 2–4)
GLUCOSE SERPL-MCNC: 85 MG/DL (ref 65–100)
HCT VFR BLD AUTO: 41.7 % (ref 36.6–50.3)
HGB BLD-MCNC: 13.4 G/DL (ref 12.1–17)
IMM GRANULOCYTES # BLD AUTO: 0 K/UL (ref 0–0.04)
IMM GRANULOCYTES NFR BLD AUTO: 0 % (ref 0–0.5)
LYMPHOCYTES # BLD: 1.4 K/UL (ref 0.8–3.5)
LYMPHOCYTES NFR BLD: 22 % (ref 12–49)
MCH RBC QN AUTO: 28.9 PG (ref 26–34)
MCHC RBC AUTO-ENTMCNC: 32.1 G/DL (ref 30–36.5)
MCV RBC AUTO: 90.1 FL (ref 80–99)
MONOCYTES # BLD: 0.6 K/UL (ref 0–1)
MONOCYTES NFR BLD: 10 % (ref 5–13)
NEUTS SEG # BLD: 4.2 K/UL (ref 1.8–8)
NEUTS SEG NFR BLD: 66 % (ref 32–75)
NRBC # BLD: 0 K/UL (ref 0–0.01)
NRBC BLD-RTO: 0 PER 100 WBC
PLATELET # BLD AUTO: 237 K/UL (ref 150–400)
PMV BLD AUTO: 11.1 FL (ref 8.9–12.9)
POTASSIUM SERPL-SCNC: 4.6 MMOL/L (ref 3.5–5.1)
PROT SERPL-MCNC: 6.7 G/DL (ref 6.4–8.2)
RBC # BLD AUTO: 4.63 M/UL (ref 4.1–5.7)
SODIUM SERPL-SCNC: 136 MMOL/L (ref 136–145)
WBC # BLD AUTO: 6.4 K/UL (ref 4.1–11.1)

## 2022-05-05 NOTE — PROGRESS NOTES
Labs are all stable. Please complete CT scan which has been ordered to follow-up chest x-ray which showed a pleural effusion or fluid in the chest.  If shortness of breath becomes severe can go to the emergency room for immediate attention. Otherwise we will arrange follow-up after CT scan is complete.

## 2022-05-10 ENCOUNTER — HOSPITAL ENCOUNTER (OUTPATIENT)
Dept: ULTRASOUND IMAGING | Age: 82
Discharge: HOME | End: 2022-05-10
Attending: INTERNAL MEDICINE
Payer: MEDICARE

## 2022-05-10 ENCOUNTER — HOSPITAL ENCOUNTER (OUTPATIENT)
Dept: GENERAL RADIOLOGY | Age: 82
Discharge: HOME | End: 2022-05-10
Attending: INTERNAL MEDICINE
Payer: MEDICARE

## 2022-05-10 ENCOUNTER — HOSPITAL ENCOUNTER (OUTPATIENT)
Dept: CT IMAGING | Age: 82
Discharge: HOME | End: 2022-05-10
Attending: INTERNAL MEDICINE
Payer: MEDICARE

## 2022-05-10 VITALS
HEART RATE: 94 BPM | DIASTOLIC BLOOD PRESSURE: 65 MMHG | SYSTOLIC BLOOD PRESSURE: 123 MMHG | OXYGEN SATURATION: 98 % | RESPIRATION RATE: 18 BRPM | TEMPERATURE: 98.2 F

## 2022-05-10 DIAGNOSIS — J90 PLEURAL EFFUSION: Primary | ICD-10-CM

## 2022-05-10 DIAGNOSIS — J90 PLEURAL EFFUSION: ICD-10-CM

## 2022-05-10 DIAGNOSIS — M54.6 THORACIC BACK PAIN: ICD-10-CM

## 2022-05-10 LAB
APPEARANCE FLD: ABNORMAL
COLOR FLD: ABNORMAL
EOSINOPHIL NFR FLD MANUAL: 4 %
GLUCOSE FLD-MCNC: <1 MG/DL
LDH FLD L TO P-CCNC: 3343 U/L
LYMPHOCYTES NFR FLD: 27 %
MESOTHL CELL NFR FLD: 3 %
MONOS+MACROS NFR FLD: 45 %
NEUTROPHILS NFR FLD: 21 %
NUC CELL # FLD: 7302 /CU MM
PROT FLD-MCNC: 5.7 G/DL
RBC # FLD: >100 /CU MM
SPECIMEN SOURCE FLD: ABNORMAL
SPECIMEN SOURCE FLD: NORMAL

## 2022-05-10 PROCEDURE — 71260 CT THORAX DX C+: CPT

## 2022-05-10 PROCEDURE — 88305 TISSUE EXAM BY PATHOLOGIST: CPT

## 2022-05-10 PROCEDURE — 87205 SMEAR GRAM STAIN: CPT

## 2022-05-10 PROCEDURE — 82945 GLUCOSE OTHER FLUID: CPT

## 2022-05-10 PROCEDURE — 88342 IMHCHEM/IMCYTCHM 1ST ANTB: CPT

## 2022-05-10 PROCEDURE — 88360 TUMOR IMMUNOHISTOCHEM/MANUAL: CPT

## 2022-05-10 PROCEDURE — 74011000636 HC RX REV CODE- 636: Performed by: INTERNAL MEDICINE

## 2022-05-10 PROCEDURE — 88341 IMHCHEM/IMCYTCHM EA ADD ANTB: CPT

## 2022-05-10 PROCEDURE — 36415 COLL VENOUS BLD VENIPUNCTURE: CPT

## 2022-05-10 PROCEDURE — 32555 ASPIRATE PLEURA W/ IMAGING: CPT

## 2022-05-10 PROCEDURE — 71045 X-RAY EXAM CHEST 1 VIEW: CPT

## 2022-05-10 PROCEDURE — 83615 LACTATE (LD) (LDH) ENZYME: CPT

## 2022-05-10 PROCEDURE — 88112 CYTOPATH CELL ENHANCE TECH: CPT

## 2022-05-10 PROCEDURE — 84157 ASSAY OF PROTEIN OTHER: CPT

## 2022-05-10 PROCEDURE — 74011000250 HC RX REV CODE- 250: Performed by: INTERNAL MEDICINE

## 2022-05-10 PROCEDURE — 89050 BODY FLUID CELL COUNT: CPT

## 2022-05-10 RX ORDER — LIDOCAINE HYDROCHLORIDE 10 MG/ML
12 INJECTION INFILTRATION; PERINEURAL
Status: COMPLETED | OUTPATIENT
Start: 2022-05-10 | End: 2022-05-10

## 2022-05-10 RX ADMIN — LIDOCAINE HYDROCHLORIDE 12 ML: 10 INJECTION, SOLUTION INFILTRATION; PERINEURAL at 11:38

## 2022-05-10 RX ADMIN — IOPAMIDOL 100 ML: 755 INJECTION, SOLUTION INTRAVENOUS at 08:34

## 2022-05-10 NOTE — PROGRESS NOTES
Per Dr. Chad Morelos, called Dr. Leighann Ivey office to inform of pt. 's large pleural effusion (right) and inquire if Dr. Milo Diop wanted thoracentesis (R) spoke with Oscar Bridges, Dr. Leighann Ivey nurse. Dr. Milo Diop ordered CT right thoracentesis - will forward to U/S. Pt in w/c to xray waiting room with blanket. Spoke with Hesham Peter, pt.'s wife per pt. Request, to inform of above and pt. And wife agree to procedure.

## 2022-05-10 NOTE — PROGRESS NOTES
Name of procedure: Right Thoracenteis    Sedation medications given: NO    Vital Signs: Stable    Fluids removed: 1500 ml opaque reddish brown fluid drained    Samples sent to lab: YES    Any complications related to procedure: None    Post Procedure Care Needed/order sets placed in [facility] care.

## 2022-05-10 NOTE — DISCHARGE INSTRUCTIONS
[facility]  Radiology Department  [phone]    Radiologist:    Date: 05/10/2022      Thoracentesis Discharge Instructions    You may have an aching pain in the puncture site tonight as the numbing medicine wears off. You may take Tylenol, as directed on the label, for pain or discomfort. Avoid ibuprofen (Advil, Motrin) and aspirin products for the next 48 hours as these drugs may increase your chance of bleeding. You have develop a mild cough as the lungs re expand with air, this should resolve with in the next 24 hours. Resume your previous diet and continue your prescribed medicaitons. Rest for the next 24 hours. If you experience shortness of breath (other than your normal breathing pattern) difficulty breathing, or have severe chest pain, call 911 and go to the nearest Emergency Room.     Be sure to follow up with your referring physician as soon as possible    Other:

## 2022-05-11 ENCOUNTER — OFFICE VISIT (OUTPATIENT)
Dept: INTERNAL MEDICINE CLINIC | Age: 82
End: 2022-05-11
Payer: MEDICARE

## 2022-05-11 VITALS
SYSTOLIC BLOOD PRESSURE: 114 MMHG | HEART RATE: 100 BPM | RESPIRATION RATE: 16 BRPM | DIASTOLIC BLOOD PRESSURE: 70 MMHG | WEIGHT: 148 LBS | TEMPERATURE: 97.9 F | BODY MASS INDEX: 21.92 KG/M2 | OXYGEN SATURATION: 97 % | HEIGHT: 69 IN

## 2022-05-11 DIAGNOSIS — J90 PLEURAL EFFUSION: ICD-10-CM

## 2022-05-11 DIAGNOSIS — R91.8 MASS OF UPPER LOBE OF RIGHT LUNG: Primary | ICD-10-CM

## 2022-05-11 PROCEDURE — 99213 OFFICE O/P EST LOW 20 MIN: CPT | Performed by: INTERNAL MEDICINE

## 2022-05-11 PROCEDURE — G8752 SYS BP LESS 140: HCPCS | Performed by: INTERNAL MEDICINE

## 2022-05-11 PROCEDURE — G8754 DIAS BP LESS 90: HCPCS | Performed by: INTERNAL MEDICINE

## 2022-05-11 PROCEDURE — G8427 DOCREV CUR MEDS BY ELIG CLIN: HCPCS | Performed by: INTERNAL MEDICINE

## 2022-05-11 PROCEDURE — 1101F PT FALLS ASSESS-DOCD LE1/YR: CPT | Performed by: INTERNAL MEDICINE

## 2022-05-11 PROCEDURE — G8536 NO DOC ELDER MAL SCRN: HCPCS | Performed by: INTERNAL MEDICINE

## 2022-05-11 PROCEDURE — G8420 CALC BMI NORM PARAMETERS: HCPCS | Performed by: INTERNAL MEDICINE

## 2022-05-11 PROCEDURE — G8432 DEP SCR NOT DOC, RNG: HCPCS | Performed by: INTERNAL MEDICINE

## 2022-05-11 NOTE — PROGRESS NOTES
Genoveva Bob is a 80 y.o. male presenting for Follow-up  . 1. Have you been to the ER, urgent care clinic since your last visit? Hospitalized since your last visit? No    2. Have you seen or consulted any other health care providers outside of the [address] [place] [address] since your last visit? Include any pap smears or colon screening. No    Fall Risk Assessment, last 12 mths 3/29/2022   Able to walk? Yes   Fall in past 12 months? 0   Do you feel unsteady? 1   Are you worried about falling 0         Abuse Screening Questionnaire 3/29/2022   Do you ever feel afraid of your partner? N   Are you in a relationship with someone who physically or mentally threatens you? N   Is it safe for you to go home? Y       3 most recent PHQ Screens 3/29/2022   Little interest or pleasure in doing things Not at all   Feeling down, depressed, irritable, or hopeless Not at all   Total Score PHQ 2 0       There are no discontinued medications.

## 2022-05-11 NOTE — PROGRESS NOTES
Joann Herrera is a 80 y.o. male and presents with Follow-up  . Subjective:  Mr. Pilar Wahl presents today for follow-up of a CT scan that was ordered after presenting with increasing shortness of breath and back pain. Chest x-ray confirmed a large right pleural effusion. CT scan confirmed a pleural effusion with a right upper lobe mass, lymphadenopathy, and pulmonary nodules. The patient underwent ultrasound-guided thoracentesis yesterday by radiology and tolerated this procedure well. 1.5 L of fluid was withdrawn. His breathing is significantly better. He continues to have some weight loss and his weight is down from 153 on May 4 to 148 today. He denies any night sweats. He denies any pleuritic chest pain currently. He denies cough or hemoptysis.     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 1/16/2018    Diet-controlled diabetes mellitus ([address]     Diverticulosis     Drug-induced polyneuropathy ([address] 1/16/2018    Due to chemotherapy    Esophageal cancer ([address] 2008    tx esophagectomy, chemo, radiation (complete 2014)    GERD (gastroesophageal reflux disease)     GERD with stricture 1/16/2018    Status post dilatation    Hx of colonic polyps     Hyperkalemia 4/29/2019    Hypertension     Kidney cyst     CHAVEZ (nonalcoholic steatohepatitis) 2000    \"fatty liver\" diet related    Personal history of colonic polyps 12/6/2012    Stage 3 chronic kidney disease ([address] 4/29/2019    Thrombocytopenia (HCC)     r/t chemo    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    Vitamin D deficiency 1/16/2018     Past Surgical History:   Procedure Laterality Date    COLONOSCOPY N/A 6/23/2016    COLONOSCOPY performed by Jessee Espinoza MD at [facility] ENDOSCOPY    COLONOSCOPY N/A 7/2/2021    COLONOSCOPY performed by Marcus Flanagan MD at [address] [place] [address]  7/2/2021         HX CATARACT REMOVAL Bilateral     HX CHOLECYSTECTOMY  11    HX ESOPHAGECTOMY  2008    HX HEMORRHOIDECTOMY      HX HERNIA REPAIR      HX ROTATOR CUFF REPAIR Right 2014    HX VASCULAR ACCESS Right 2008    port placed and removed in 2014    ND COLSC FLX W/RMVL OF TUMOR POLYP LESION SNARE TQ  2010         ND EGD BALLOON DILATION ESOPHAGUS <30 MM DIAM  3/3/2011         ND EGD BALLOON DILATION ESOPHAGUS <30 MM DIAM  2012         ND EGD TRANSORAL BIOPSY SINGLE/MULTIPLE  3/29/2012         UPPER GI ENDOSCOPY,BALL DIL,30MM  2018         UPPER GI ENDOSCOPY,BALL DIL,30MM  2020          No Known Allergies  Current Outpatient Medications   Medication Sig Dispense Refill    hydroCHLOROthiazide (HYDRODIURIL) 12.5 mg tablet TAKE 1 TABLET EVERY OTHER DAY 45 Tablet 0    cholecalciferol, vitamin D3, (VITAMIN D3) 50 mcg (2,000 unit) tab Take 1 Tab by mouth daily.  lisinopril (PRINIVIL, ZESTRIL) 20 mg tablet Take 20 mg by mouth daily.  esomeprazole (NEXIUM) 40 mg capsule TAKE 1 CAPSULE DAILY 90 Cap 1    folic acid-vit O8-RIW D27 (FOLGARD RX) 2.2-25-1 mg tab Take 1 Tab by mouth daily.  VITAMINS A AND D PO Take 1 Cap by mouth daily.  sildenafil citrate (VIAGRA) 50 mg tablet Take 50 mg by mouth as needed.  CHOLESTYRAMINE/ASPARTAME (PREVALITE PO) Take 5.5 g by mouth every evening.  CYANOCOBALAMIN (VITAMIN B-12 PO) Take 1,000 mg by mouth daily.  pyridoxine (VITAMIN B-6) 100 mg tablet Take 100 mg by mouth daily.  MULTIVITAMINS (MULTIVITAMIN PO) Take 1 Tablet by mouth daily.        Social History     Socioeconomic History    Marital status:    Tobacco Use    Smoking status: Former Smoker     Packs/day: 2.00     Years: 25.00     Pack years: 50.00     Quit date: 1987     Years since quittin.3    Smokeless tobacco: Never Used   Vaping Use    Vaping Use: Never used   Substance and Sexual Activity    Alcohol use: No    Drug use: No     Family History Problem Relation Age of Onset    Diabetes Mother     OSTEOARTHRITIS Father     Cancer Sister         lung    Cancer Sister         breast       Review of Systems  Constitutional:  negative for fevers, chills, anorexia   Eyes:    negative for visual disturbance and irritation  ENT:    negative for tinnitus,sore throat,nasal congestion,ear pains. hoarseness  Respiratory:     negative for cough, hemoptysis, ,wheezing  CV:    negative for chest pain, palpitations, lower extremity edema  GI:    negative for nausea, vomiting, diarrhea, abdominal pain,melena  Endo:               negative for polyuria,polydipsia,polyphagia,heat intolerance  Genitourinary : negative for frequency, dysuria and hematuria  Integumentary: negative for rash and pruritus  Hematologic:   negative for easy bruising and gum/nose bleeding  Musculoskel:  negative for myalgias, arthralgias, back pain, muscle weakness, joint pain  Neurological:   negative for headaches, dizziness, vertigo, memory problems and gait   Behavl/Psych:  negative for feelings of anxiety, depression, mood changes  ROS otherwise negative      Objective:  Visit Vitals  /70 (BP 1 Location: Right arm, BP Patient Position: Sitting, BP Cuff Size: Adult)   Pulse 100   Temp 97.9 °F (36.6 °C) (Oral)   Resp 16   Ht 5' 9\" (1.753 m)   Wt 148 lb (67.1 kg)   SpO2 97%   BMI 21.86 kg/m²     Physical Exam:   General appearance - alert, well appearing, and in no distress  Mental status - alert, oriented to person, place, and time  EYE-LUÍS, EOMI, fundi normal, corneas normal, no foreign bodies  ENT-ENT exam normal, no neck nodes or sinus tenderness  Nose - normal and patent, no erythema, discharge or polyps  Mouth - mucous membranes moist, pharynx normal without lesions  Neck - supple, no significant adenopathy   Chest -decreased breath sounds right base  Heart - normal rate, regular rhythm, normal S1, S2, no murmurs, rubs, clicks or gallops   Abdomen - soft, nontender, nondistended, no masses or organomegaly  Lymph- no adenopathy palpable  Ext-peripheral pulses normal, no pedal edema, no clubbing or cyanosis  Skin-Warm and dry. no hyperpigmentation, vitiligo, or suspicious lesions  Neuro -alert, oriented, normal speech, no focal findings or movement disorder noted      Assessment/Plan:  Diagnoses and all orders for this visit:    1. Mass of upper lobe of right lung  -     REFERRAL TO HEMATOLOGY ONCOLOGY    2. Pleural effusion  -     REFERRAL TO HEMATOLOGY ONCOLOGY          ICD-10-CM ICD-9-CM    1. Mass of upper lobe of right lung  R91.8 786.6 REFERRAL TO HEMATOLOGY ONCOLOGY   2. Pleural effusion  J90 511.9 REFERRAL TO HEMATOLOGY ONCOLOGY     Plan:    CT scan unfortunately shows a large right upper lobe mass with hilar lymphadenopathy. The patient's pleural effusion is improved status postthoracentesis. Cytology is pending. If positive this would confirm stage IIIb lung cancer. If cytology is not revealing may require biopsy as discussed. The patient has a follow-up with Dr. Valentin Page for previous history of esophageal cancer years ago (previously followed by Dr. Yamini Corona). We will confirm appointment and follow-up cytology report. The patient and his wife acknowledged understanding of a probable diagnosis of lung cancer. Their daughter is present with him today and understands the situation and plan of care going forward. I have reviewed with the patient details of the assessment and plan and all questions were answered. Relevent patient education was performed. Verbal and/or written instructions (see AVS) provided. The most recent lab findings were reviewed with the patient. Plan was discussed with patient who verbally expressed understanding. An After Visit Summary was printed and given to the patient.     Eduardo Solorio MD

## 2022-05-13 ENCOUNTER — APPOINTMENT (OUTPATIENT)
Dept: CT IMAGING | Age: 82
End: 2022-05-13
Attending: INTERNAL MEDICINE

## 2022-05-14 LAB
BACTERIA SPEC CULT: NORMAL
GRAM STN SPEC: NORMAL
GRAM STN SPEC: NORMAL
SERVICE CMNT-IMP: NORMAL

## 2022-05-17 ENCOUNTER — TRANSCRIBE ORDER (OUTPATIENT)
Dept: SCHEDULING | Age: 82
End: 2022-05-17

## 2022-05-17 DIAGNOSIS — C34.11 MALIGNANT NEOPLASM OF UPPER LOBE OF RIGHT LUNG (HCC): Primary | ICD-10-CM

## 2022-05-17 DIAGNOSIS — Z85.01 PERSONAL HISTORY OF MALIGNANT NEOPLASM OF ESOPHAGUS: ICD-10-CM

## 2022-05-18 ENCOUNTER — TRANSCRIBE ORDER (OUTPATIENT)
Dept: SCHEDULING | Age: 82
End: 2022-05-18

## 2022-05-18 DIAGNOSIS — C34.11 MALIGNANT NEOPLASM OF UPPER LOBE, RIGHT BRONCHUS OR LUNG (HCC): ICD-10-CM

## 2022-05-18 DIAGNOSIS — Z85.01 PERSONAL HISTORY OF MALIGNANT NEOPLASM OF ESOPHAGUS: Primary | ICD-10-CM

## 2022-05-19 ENCOUNTER — HOSPITAL ENCOUNTER (OUTPATIENT)
Dept: MRI IMAGING | Age: 82
Discharge: HOME | End: 2022-05-19
Attending: INTERNAL MEDICINE
Payer: MEDICARE

## 2022-05-19 ENCOUNTER — TRANSCRIBE ORDER (OUTPATIENT)
Dept: SCHEDULING | Age: 82
End: 2022-05-19

## 2022-05-19 ENCOUNTER — HOSPITAL ENCOUNTER (OUTPATIENT)
Dept: INTERVENTIONAL RADIOLOGY/VASCULAR | Age: 82
Discharge: HOME | End: 2022-05-19
Attending: INTERNAL MEDICINE
Payer: MEDICARE

## 2022-05-19 VITALS
RESPIRATION RATE: 16 BRPM | TEMPERATURE: 97.8 F | OXYGEN SATURATION: 98 % | HEIGHT: 69 IN | HEART RATE: 80 BPM | SYSTOLIC BLOOD PRESSURE: 97 MMHG | DIASTOLIC BLOOD PRESSURE: 58 MMHG | WEIGHT: 148 LBS | BODY MASS INDEX: 21.92 KG/M2

## 2022-05-19 DIAGNOSIS — C34.11 MALIGNANT NEOPLASM OF UPPER LOBE, RIGHT BRONCHUS OR LUNG (HCC): ICD-10-CM

## 2022-05-19 DIAGNOSIS — Z85.01 PERSONAL HISTORY OF MALIGNANT NEOPLASM OF ESOPHAGUS: ICD-10-CM

## 2022-05-19 DIAGNOSIS — C34.11 MALIGNANT NEOPLASM OF UPPER LOBE OF RIGHT LUNG (HCC): ICD-10-CM

## 2022-05-19 PROCEDURE — 2709999900 HC NON-CHARGEABLE SUPPLY

## 2022-05-19 PROCEDURE — 74011250636 HC RX REV CODE- 250/636: Performed by: INTERNAL MEDICINE

## 2022-05-19 PROCEDURE — 77030010507 HC ADH SKN DERMBND J&J -B

## 2022-05-19 PROCEDURE — 36561 INSERT TUNNELED CV CATH: CPT

## 2022-05-19 PROCEDURE — 74011250636 HC RX REV CODE- 250/636: Performed by: RADIOLOGY

## 2022-05-19 PROCEDURE — 74011000250 HC RX REV CODE- 250: Performed by: INTERNAL MEDICINE

## 2022-05-19 PROCEDURE — C1892 INTRO/SHEATH,FIXED,PEEL-AWAY: HCPCS

## 2022-05-19 PROCEDURE — 70553 MRI BRAIN STEM W/O & W/DYE: CPT

## 2022-05-19 PROCEDURE — 77030031139 HC SUT VCRL2 J&J -A

## 2022-05-19 PROCEDURE — A9576 INJ PROHANCE MULTIPACK: HCPCS | Performed by: INTERNAL MEDICINE

## 2022-05-19 PROCEDURE — 74011000250 HC RX REV CODE- 250: Performed by: RADIOLOGY

## 2022-05-19 RX ORDER — FENTANYL CITRATE 50 UG/ML
25-100 INJECTION, SOLUTION INTRAMUSCULAR; INTRAVENOUS
Status: DISCONTINUED | OUTPATIENT
Start: 2022-05-19 | End: 2022-05-23 | Stop reason: HOSPADM

## 2022-05-19 RX ORDER — CEFAZOLIN SODIUM 1 G/3ML
INJECTION, POWDER, FOR SOLUTION INTRAMUSCULAR; INTRAVENOUS
Status: DISCONTINUED
Start: 2022-05-19 | End: 2022-05-23 | Stop reason: HOSPADM

## 2022-05-19 RX ORDER — LIDOCAINE HYDROCHLORIDE AND EPINEPHRINE 10; 10 MG/ML; UG/ML
20 INJECTION, SOLUTION INFILTRATION; PERINEURAL ONCE
Status: COMPLETED | OUTPATIENT
Start: 2022-05-19 | End: 2022-05-19

## 2022-05-19 RX ORDER — LIDOCAINE HYDROCHLORIDE 20 MG/ML
15 INJECTION, SOLUTION INFILTRATION; PERINEURAL ONCE
Status: COMPLETED | OUTPATIENT
Start: 2022-05-19 | End: 2022-05-19

## 2022-05-19 RX ORDER — HEPARIN 100 UNIT/ML
300 SYRINGE INTRAVENOUS ONCE
Status: COMPLETED | OUTPATIENT
Start: 2022-05-19 | End: 2022-05-19

## 2022-05-19 RX ORDER — MIDAZOLAM HYDROCHLORIDE 1 MG/ML
0-10 INJECTION, SOLUTION INTRAMUSCULAR; INTRAVENOUS
Status: DISCONTINUED | OUTPATIENT
Start: 2022-05-19 | End: 2022-05-23 | Stop reason: HOSPADM

## 2022-05-19 RX ORDER — HEPARIN SODIUM 200 [USP'U]/100ML
400 INJECTION, SOLUTION INTRAVENOUS ONCE
Status: COMPLETED | OUTPATIENT
Start: 2022-05-19 | End: 2022-05-19

## 2022-05-19 RX ORDER — CEFAZOLIN SODIUM 1 G/3ML
2 INJECTION, POWDER, FOR SOLUTION INTRAMUSCULAR; INTRAVENOUS ONCE
Status: DISCONTINUED | OUTPATIENT
Start: 2022-05-19 | End: 2022-05-19 | Stop reason: CLARIF

## 2022-05-19 RX ORDER — WATER FOR INJECTION,STERILE
VIAL (ML) INJECTION
Status: DISCONTINUED
Start: 2022-05-19 | End: 2022-05-23 | Stop reason: HOSPADM

## 2022-05-19 RX ADMIN — GADOTERIDOL 15 ML: 279.3 INJECTION, SOLUTION INTRAVENOUS at 12:00

## 2022-05-19 RX ADMIN — CEFAZOLIN SODIUM 2 G: 1 INJECTION, POWDER, FOR SOLUTION INTRAMUSCULAR; INTRAVENOUS at 09:50

## 2022-05-19 RX ADMIN — LIDOCAINE HYDROCHLORIDE AND EPINEPHRINE 200 MG: 10; 10 INJECTION, SOLUTION INFILTRATION; PERINEURAL at 09:50

## 2022-05-19 RX ADMIN — HEPARIN SODIUM IN SODIUM CHLORIDE 200 UNITS: 200 INJECTION INTRAVENOUS at 09:59

## 2022-05-19 RX ADMIN — MIDAZOLAM HYDROCHLORIDE 2 MG: 1 INJECTION, SOLUTION INTRAMUSCULAR; INTRAVENOUS at 09:53

## 2022-05-19 RX ADMIN — FENTANYL CITRATE 50 MCG: 50 INJECTION, SOLUTION INTRAMUSCULAR; INTRAVENOUS at 09:53

## 2022-05-19 RX ADMIN — SODIUM CHLORIDE, PRESERVATIVE FREE 300 UNITS: 5 INJECTION INTRAVENOUS at 09:50

## 2022-05-19 RX ADMIN — LIDOCAINE HYDROCHLORIDE 300 MG: 20 INJECTION, SOLUTION INFILTRATION; PERINEURAL at 09:50

## 2022-05-19 NOTE — DISCHARGE INSTRUCTIONS
[address] [facility]  Special Procedures/Angiography Department      Radiologist: Dr Minnie KAPLAN     Date:   5/19/22      [facility] Discharge Instructions      Watch for signs of infection:    1. Redness,   2. Fever, chills,   3. Increased pain, and/or drainage from the site. If this occurs, call your physician at once. Keep your dressing clean and dry. The dressing may be changed in your physicians office. Continue your previous diet and follow the medication reconciliation list.    You may take Tylenol, as directed on the label, for pain. Avoid ibuprofen (Advil, Motrin) and aspirin as they may cause you to bleed. Because you received sedation, you are not to drive or sign any legal documents for the next 24 hours. Do not lift anything heavier than 5 pounds with the affected arm for the next week.     If you have any questions or concerns, please call [phone] and ask for the nurse on-call

## 2022-05-19 NOTE — H&P
Interventional and Vascular Radiology History and Physical    Patient: Mae Gaucher 80 y.o. male       Chief Complaint: No chief complaint on file.       History of Present Illness: chemotherapy     History:    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 2018    [facility]     Diverticulosis     Drug-induced polyneuropathy ([facility] 2018    Due to chemotherapy    Esophageal cancer ([facility]     tx esophagectomy, chemo, radiation (complete )    GERD (gastroesophageal reflux disease)     GERD with stricture 2018    Status post dilatation    Hx of colonic polyps     Hyperkalemia 2019    Hypertension     Kidney cyst     CHAVEZ (nonalcoholic steatohepatitis)     \"fatty liver\" diet related    Personal history of colonic polyps 2012    Stage 3 chronic kidney disease ([facility] 2019    Thrombocytopenia (HCC)     r/t chemo    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 2018    Vitamin D deficiency 2018     Family History   Problem Relation Age of Onset    Diabetes Mother     OSTEOARTHRITIS Father     Cancer Sister         lung    Cancer Sister         breast     Social History     Socioeconomic History    Marital status:      Spouse name: Not on file    Number of children: Not on file    Years of education: Not on file    Highest education level: Not on file   Occupational History    Not on file   Tobacco Use    Smoking status: Former Smoker     Packs/day: 2.00     Years: 25.00     Pack years: 50.00     Quit date: 1987     Years since quittin.4    Smokeless tobacco: Never Used   Vaping Use    Vaping Use: Never used   Substance and Sexual Activity    Alcohol use: No    Drug use: No    Sexual activity: Not on file   Other Topics Concern    Not on file   Social History Narrative    Not on file     Social Determinants of Health     Financial Resource Strain:     Difficulty of Paying Living Expenses: Not on file   Food Insecurity:     Worried About Running Out of Food in the Last Year: Not on file    Jose of Food in the Last Year: Not on file   Transportation Needs:     Lack of Transportation (Medical): Not on file    Lack of Transportation (Non-Medical): Not on file   Physical Activity:     Days of Exercise per Week: Not on file    Minutes of Exercise per Session: Not on file   Stress:     Feeling of Stress : Not on file   Social Connections:     Frequency of Communication with Friends and Family: Not on file    Frequency of Social Gatherings with Friends and Family: Not on file    Attends Roman Catholic Services: Not on file    Active Member of [address] [place] [address] [place] or Organizations: Not on file    Attends Club or Organization Meetings: Not on file    Marital Status: Not on file   Intimate Partner Violence:     Fear of Current or Ex-Partner: Not on file    Emotionally Abused: Not on file    Physically Abused: Not on file    Sexually Abused: Not on file   Housing Stability:     Unable to Pay for Housing in the Last Year: Not on file    Number of [place] in the Last Year: Not on file    Unstable Housing in the Last Year: Not on file       Allergies: No Known Allergies    Current Medications:  Current Outpatient Medications   Medication Sig    hydroCHLOROthiazide (HYDRODIURIL) 12.5 mg tablet TAKE 1 TABLET EVERY OTHER DAY    cholecalciferol, vitamin D3, (VITAMIN D3) 50 mcg (2,000 unit) tab Take 1 Tab by mouth daily.  lisinopril (PRINIVIL, ZESTRIL) 20 mg tablet Take 20 mg by mouth daily.  esomeprazole (NEXIUM) 40 mg capsule TAKE 1 CAPSULE DAILY    folic acid-vit I8-JQT K88 (FOLGARD RX) 2.2-25-1 mg tab Take 1 Tab by mouth daily.  VITAMINS A AND D PO Take 1 Cap by mouth daily.  sildenafil citrate (VIAGRA) 50 mg tablet Take 50 mg by mouth as needed.     CHOLESTYRAMINE/ASPARTAME (PREVALITE PO) Take 5.5 g by mouth every evening.  CYANOCOBALAMIN (VITAMIN B-12 PO) Take 1,000 mg by mouth daily.  pyridoxine (VITAMIN B-6) 100 mg tablet Take 100 mg by mouth daily.  MULTIVITAMINS (MULTIVITAMIN PO) Take 1 Tablet by mouth daily. Current Facility-Administered Medications   Medication Dose Route Frequency    midazolam (VERSED) injection 0-10 mg  0-10 mg IntraVENous Rad Multiple    fentaNYL citrate (PF) injection  mcg   mcg IntraVENous Rad Multiple    ceFAZolin (ANCEF) 1 gram injection        sterile water (preservative free) injection            Physical Exam:  Blood pressure (!) 97/58, pulse 80, temperature 97.8 °F (36.6 °C), resp. rate 16, height 5' 9\" (1.753 m), weight 67.1 kg (148 lb), SpO2 98 %. LUNG: clear to auscultation bilaterally, HEART: regular rate and rhythm, S1, S2 normal, no murmur, click, rub or gallop      Alerts:    Hospital Problems  Date Reviewed: 3/1/2022    None          Laboratory:    No results for input(s): HGB, HCT, WBC, PLT, INR, BUN, CREA, K, CRCLT, HGBEXT, HCTEXT, PLTEXT, INREXT in the last 72 hours. No lab exists for component: PTT, PT      Plan of Care/Planned Procedure:  Risks, benefits, and alternatives reviewed with patient and he agrees to proceed with the procedure. Conscious sedation will be performed with IV fentanyl and versed.  Plan is for chest port placement       Mazin Gonzalez MD

## 2022-05-19 NOTE — PROGRESS NOTES
Pt tolerated port placement well  Start time 0953  End time 1005  Fentanyl 50mcg  Versed 2 mg  Ancef 2gm    Pt over to MRI to get MRI of head. Test completed without incident. Pt tolerated procedure well, VSS, pt verbalized understanding of discharge info, copies received, Pt able to tolerated PO fluids and crackers. Pt discharged via wheelchair to family in private vehicle.

## 2022-05-20 ENCOUNTER — HOSPITAL ENCOUNTER (OUTPATIENT)
Dept: GENERAL RADIOLOGY | Age: 82
Discharge: HOME | End: 2022-05-20
Payer: MEDICARE

## 2022-05-20 ENCOUNTER — TRANSCRIBE ORDER (OUTPATIENT)
Dept: REGISTRATION | Age: 82
End: 2022-05-20

## 2022-05-20 DIAGNOSIS — C34.11 MALIGNANT NEOPLASM OF UPPER LOBE OF RIGHT LUNG (HCC): ICD-10-CM

## 2022-05-20 DIAGNOSIS — Z85.01 PERSONAL HISTORY OF MALIGNANT NEOPLASM OF ESOPHAGUS: Primary | ICD-10-CM

## 2022-05-20 DIAGNOSIS — Z85.01 PERSONAL HISTORY OF MALIGNANT NEOPLASM OF ESOPHAGUS: ICD-10-CM

## 2022-05-20 PROCEDURE — 71046 X-RAY EXAM CHEST 2 VIEWS: CPT

## 2022-05-23 ENCOUNTER — TRANSCRIBE ORDER (OUTPATIENT)
Dept: SCHEDULING | Age: 82
End: 2022-05-23

## 2022-05-23 DIAGNOSIS — J91.0 MALIGNANT PLEURAL EFFUSION: Primary | ICD-10-CM

## 2022-05-24 ENCOUNTER — HOSPITAL ENCOUNTER (OUTPATIENT)
Dept: INTERVENTIONAL RADIOLOGY/VASCULAR | Age: 82
Discharge: HOME | End: 2022-05-24
Attending: INTERNAL MEDICINE
Payer: MEDICARE

## 2022-05-24 VITALS
OXYGEN SATURATION: 98 % | RESPIRATION RATE: 20 BRPM | HEART RATE: 80 BPM | HEIGHT: 68 IN | TEMPERATURE: 98.6 F | DIASTOLIC BLOOD PRESSURE: 58 MMHG | WEIGHT: 140 LBS | BODY MASS INDEX: 21.22 KG/M2 | SYSTOLIC BLOOD PRESSURE: 111 MMHG

## 2022-05-24 DIAGNOSIS — J91.0 MALIGNANT PLEURAL EFFUSION: ICD-10-CM

## 2022-05-24 PROCEDURE — 74011000250 HC RX REV CODE- 250: Performed by: STUDENT IN AN ORGANIZED HEALTH CARE EDUCATION/TRAINING PROGRAM

## 2022-05-24 PROCEDURE — C1729 CATH, DRAINAGE: HCPCS

## 2022-05-24 PROCEDURE — 77030031139 HC SUT VCRL2 J&J -A

## 2022-05-24 PROCEDURE — 77030002996 HC SUT SLK J&J -A

## 2022-05-24 PROCEDURE — 74011250636 HC RX REV CODE- 250/636: Performed by: INTERNAL MEDICINE

## 2022-05-24 PROCEDURE — 2709999900 HC NON-CHARGEABLE SUPPLY

## 2022-05-24 PROCEDURE — 74011250636 HC RX REV CODE- 250/636: Performed by: STUDENT IN AN ORGANIZED HEALTH CARE EDUCATION/TRAINING PROGRAM

## 2022-05-24 PROCEDURE — 77030010507 HC ADH SKN DERMBND J&J -B

## 2022-05-24 PROCEDURE — 74011000250 HC RX REV CODE- 250: Performed by: INTERNAL MEDICINE

## 2022-05-24 PROCEDURE — C1892 INTRO/SHEATH,FIXED,PEEL-AWAY: HCPCS

## 2022-05-24 PROCEDURE — 32550 INSERT PLEURAL CATH: CPT

## 2022-05-24 RX ORDER — HEPARIN SODIUM 200 [USP'U]/100ML
400 INJECTION, SOLUTION INTRAVENOUS ONCE
Status: COMPLETED | OUTPATIENT
Start: 2022-05-24 | End: 2022-05-24

## 2022-05-24 RX ORDER — MIDAZOLAM HYDROCHLORIDE 1 MG/ML
1-5 INJECTION, SOLUTION INTRAMUSCULAR; INTRAVENOUS
Status: DISCONTINUED | OUTPATIENT
Start: 2022-05-24 | End: 2022-05-24

## 2022-05-24 RX ORDER — SODIUM CHLORIDE 9 MG/ML
25 INJECTION, SOLUTION INTRAVENOUS CONTINUOUS
Status: DISCONTINUED | OUTPATIENT
Start: 2022-05-24 | End: 2022-05-24

## 2022-05-24 RX ORDER — LIDOCAINE HYDROCHLORIDE 20 MG/ML
10 INJECTION, SOLUTION INFILTRATION; PERINEURAL ONCE
Status: COMPLETED | OUTPATIENT
Start: 2022-05-24 | End: 2022-05-24

## 2022-05-24 RX ORDER — FENTANYL CITRATE 50 UG/ML
100 INJECTION, SOLUTION INTRAMUSCULAR; INTRAVENOUS
Status: DISCONTINUED | OUTPATIENT
Start: 2022-05-24 | End: 2022-05-24

## 2022-05-24 RX ADMIN — LIDOCAINE HYDROCHLORIDE 200 MG: 20 INJECTION, SOLUTION INFILTRATION; PERINEURAL at 12:14

## 2022-05-24 RX ADMIN — FENTANYL CITRATE 50 MCG: 50 INJECTION, SOLUTION INTRAMUSCULAR; INTRAVENOUS at 11:58

## 2022-05-24 RX ADMIN — WATER 2 G: 1 INJECTION INTRAMUSCULAR; INTRAVENOUS; SUBCUTANEOUS at 11:59

## 2022-05-24 RX ADMIN — FENTANYL CITRATE 25 MCG: 50 INJECTION, SOLUTION INTRAMUSCULAR; INTRAVENOUS at 12:12

## 2022-05-24 RX ADMIN — HEPARIN SODIUM IN SODIUM CHLORIDE 200 UNITS: 200 INJECTION INTRAVENOUS at 12:14

## 2022-05-24 RX ADMIN — MIDAZOLAM HYDROCHLORIDE 2 MG: 1 INJECTION, SOLUTION INTRAMUSCULAR; INTRAVENOUS at 12:06

## 2022-05-24 RX ADMIN — SODIUM CHLORIDE 25 ML/HR: 9 INJECTION, SOLUTION INTRAVENOUS at 11:59

## 2022-05-24 NOTE — PROCEDURES
Interventional Radiology  Procedure Note        5/24/2022 1:46 PM    Patient: Casie Cuellar     Informed consent obtained    Diagnosis: Malignant pleural effusion    Procedure(s): Right chest pleural Aspira catheter placement    Specimens removed:  non    Complications: None    Primary Physician: James Mahoney MD    Recommendations: N/A    Discharge Disposition: Stable; discharge to home    Full dictated report to follow    James Mahoney MD  Interventional Radiology  [facility]  1:46 PM, 5/24/2022

## 2022-05-24 NOTE — H&P
Radiology History and Physical    Patient: Alfa Abel 80 y.o. male       Chief Complaint: No chief complaint on file. History of Present Illness: 80year old male with lung cancer and malignant pleural effusion, presents for Aspira catheter placement.     History:    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 2018    Diet-controlled diabetes mellitus ([address]     Diverticulosis     Drug-induced polyneuropathy ([address] 2018    Due to chemotherapy    Esophageal cancer ([address]     tx esophagectomy, chemo, radiation (complete )    GERD (gastroesophageal reflux disease)     GERD with stricture 2018    Status post dilatation    Hx of colonic polyps     Hyperkalemia 2019    Hypertension     Kidney cyst     CHAVEZ (nonalcoholic steatohepatitis)     \"fatty liver\" diet related    Personal history of colonic polyps 2012    Stage 3 chronic [facility] ([facility] 2019    Thrombocytopenia (HCC)     r/t chemo    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 2018    Vitamin D deficiency 2018     Family History   Problem Relation Age of Onset    Diabetes Mother     OSTEOARTHRITIS Father     Cancer Sister         lung    Cancer Sister         breast     Social History     Socioeconomic History    Marital status:      Spouse name: Not on file    Number of children: Not on file    Years of education: Not on file    Highest education level: Not on file   Occupational History    Not on file   Tobacco Use    Smoking status: Former Smoker     Packs/day: 2.00     Years: 25.00     Pack years: 50.00     Quit date: 1987     Years since quittin.4    Smokeless tobacco: Never Used   Vaping Use    Vaping Use: Never used   Substance and Sexual Activity    Alcohol use: No    Drug use: No    Sexual activity: Not on file   Other Topics Concern    Not on file Social History Narrative    Not on file     Social Determinants of Health     Financial Resource Strain:     Difficulty of Paying Living Expenses: Not on file   Food Insecurity:     Worried About Running Out of Food in the Last Year: Not on file    Jose of Food in the Last Year: Not on file   Transportation Needs:     Lack of Transportation (Medical): Not on file    Lack of Transportation (Non-Medical): Not on file   Physical Activity:     Days of Exercise per Week: Not on file    Minutes of Exercise per Session: Not on file   Stress:     Feeling of Stress : Not on file   Social Connections:     Frequency of Communication with Friends and Family: Not on file    Frequency of Social Gatherings with Friends and Family: Not on file    Attends Taoism Services: Not on file    Active Member of [address] [place] [address] [employer] or Organizations: Not on file    Attends Club or Organization Meetings: Not on file    Marital Status: Not on file   Intimate Partner Violence:     Fear of Current or Ex-Partner: Not on file    Emotionally Abused: Not on file    Physically Abused: Not on file    Sexually Abused: Not on file   Housing Stability:     Unable to Pay for Housing in the Last Year: Not on file    Number of [place] in the Last Year: Not on file    Unstable Housing in the Last Year: Not on file       Allergies: No Known Allergies    Current Medications:  Current Outpatient Medications   Medication Sig    hydroCHLOROthiazide (HYDRODIURIL) 12.5 mg tablet TAKE 1 TABLET EVERY OTHER DAY    cholecalciferol, vitamin D3, (VITAMIN D3) 50 mcg (2,000 unit) tab Take 1 Tab by mouth daily.  lisinopril (PRINIVIL, ZESTRIL) 20 mg tablet Take 20 mg by mouth daily.  esomeprazole (NEXIUM) 40 mg capsule TAKE 1 CAPSULE DAILY    folic acid-vit A3-PFI O94 (FOLGARD RX) 2.2-25-1 mg tab Take 1 Tab by mouth daily.  VITAMINS A AND D PO Take 1 Cap by mouth daily.     sildenafil citrate (VIAGRA) 50 mg tablet Take 50 mg by mouth as needed.  CHOLESTYRAMINE/ASPARTAME (PREVALITE PO) Take 5.5 g by mouth every evening.  CYANOCOBALAMIN (VITAMIN B-12 PO) Take 1,000 mg by mouth daily.  pyridoxine (VITAMIN B-6) 100 mg tablet Take 100 mg by mouth daily.  MULTIVITAMINS (MULTIVITAMIN PO) Take 1 Tablet by mouth daily. No current facility-administered medications for this encounter. Physical Exam:  Blood pressure (!) 98/56, pulse 77, temperature 98.6 °F (37 °C), resp. rate 14, height 5' 8\" (1.727 m), weight 63.5 kg (140 lb), SpO2 100 %. GENERAL: alert, cooperative, no distress, appears stated age,   LUNG: Nonlabored respiration on room air  HEART: regular rate and rhythm    Alerts:    Hospital Problems  Date Reviewed: 3/1/2022    None          Laboratory:    No results for input(s): HGB, HCT, WBC, PLT, INR, BUN, CREA, K, CRCLT, HGBEXT, HCTEXT, PLTEXT, INREXT in the last 72 hours. No lab exists for component: PTT, PT      Plan of Care/Planned Procedure:  Risks, benefits, and alternatives reviewed with patient and he agrees to proceed with the procedure. Right chest pleural Aspira catheter placement    Deemed appropriate for moderate sedation with versed and fentanyl.     Claudette Rang, MD  Interventional Radiology  [facility]  1:45 PM, 5/24/2022

## 2022-05-24 NOTE — PROGRESS NOTES
Daughter and wife instructed on aspira drainage catheter care. Return demonstration performed without any difficulties. ! 000 ccs of sanguinous fluid removed without difficulty.

## 2022-05-24 NOTE — PROGRESS NOTES
Sedation record  Start time 1206  End time 1224  2 mg of versed with 75 mcg of fentanyl IV  2 grams of Ancef prior to procedure.

## 2022-05-24 NOTE — DISCHARGE INSTRUCTIONS
[address]  Radiology Department  [phone]    Radiologist: Dr. Kyra Mace    Date:5/24/2022    [facility] Catheter Placement      Go home and rest and restrict your activity the next 24 hours. You have been given sedating medications, so do not drive or make important decisions today. Resume your previous diet and prescribed medications. Dressing around the catheter must remain dry and intact. You may take a bath as long as the catheter stays dry, no showers. You have been provided all the necessary supplies to drain at home and complete dressing kits when the site has become wet, soiled or more than a week old. Be sure to inspect your catheter site with each dressing change. Watch for signs of infection:  redness, swelling, pus, drainage, fever or chills. If you have any of these signs and symptoms call your physician immediately    Instructions are included in the supply box and may also be found on-line at www. [employer]    Additional supply kits can be ordered through you physicians office. If you have any questions regarding your procedure today please call and ask to speak to a radiology nurse. Erica Bledsoe

## 2022-05-24 NOTE — PROGRESS NOTES
Procedure reviewed with patient by Dr. January Hill. Opportunity to verbalize questions and concerns. Consent obtained.

## 2022-05-25 NOTE — PROGRESS NOTES
Patient presented to IR today for a port site check stating that the oncology RN was concerned for possible infection today. Dermabond is still intact to right upper chest insertion site. Redness is present above incision site. Area is not warm to touch and not discharge is present. Dr. Elizabeth Zamora coming to evaluate port insertion site.

## 2022-05-26 ENCOUNTER — HOSPITAL ENCOUNTER (OUTPATIENT)
Dept: PET IMAGING | Age: 82
Discharge: HOME | End: 2022-05-26
Attending: INTERNAL MEDICINE
Payer: MEDICARE

## 2022-05-26 DIAGNOSIS — C34.11 MALIGNANT NEOPLASM OF UPPER LOBE OF RIGHT LUNG (HCC): ICD-10-CM

## 2022-05-26 DIAGNOSIS — Z85.01 PERSONAL HISTORY OF MALIGNANT NEOPLASM OF ESOPHAGUS: ICD-10-CM

## 2022-05-26 LAB
GLUCOSE BLD STRIP.AUTO-MCNC: 111 MG/DL (ref 65–117)
SERVICE CMNT-IMP: NORMAL

## 2022-05-26 PROCEDURE — A9552 F18 FDG: HCPCS

## 2022-05-26 RX ORDER — FLUDEOXYGLUCOSE F-18 200 MCI/ML
10 INJECTION INTRAVENOUS ONCE
Status: COMPLETED | OUTPATIENT
Start: 2022-05-26 | End: 2022-05-26

## 2022-05-26 RX ADMIN — FLUDEOXYGLUCOSE F-18 10 MILLICURIE: 200 INJECTION INTRAVENOUS at 10:56

## 2022-06-02 ENCOUNTER — TRANSCRIBE ORDER (OUTPATIENT)
Dept: SCHEDULING | Age: 82
End: 2022-06-02

## 2022-06-02 DIAGNOSIS — C34.11 MALIGNANT NEOPLASM OF UPPER LOBE OF RIGHT LUNG (HCC): ICD-10-CM

## 2022-06-02 DIAGNOSIS — J91.0 MALIGNANT PLEURAL EFFUSION: ICD-10-CM

## 2022-06-02 DIAGNOSIS — E83.42 HYPOMAGNESEMIA: ICD-10-CM

## 2022-06-02 DIAGNOSIS — Z85.01 PERSONAL HISTORY OF MALIGNANT NEOPLASM OF ESOPHAGUS: Primary | ICD-10-CM

## 2022-06-02 DIAGNOSIS — G89.3 NEOPLASM RELATED PAIN: ICD-10-CM

## 2022-06-28 ENCOUNTER — HOSPITAL ENCOUNTER (OUTPATIENT)
Dept: GENERAL RADIOLOGY | Age: 82
Discharge: HOME | End: 2022-06-28
Attending: INTERNAL MEDICINE
Payer: MEDICARE

## 2022-06-28 ENCOUNTER — HOSPITAL ENCOUNTER (OUTPATIENT)
Dept: PET IMAGING | Age: 82
Discharge: HOME | End: 2022-06-28
Attending: INTERNAL MEDICINE
Payer: MEDICARE

## 2022-06-28 ENCOUNTER — TRANSCRIBE ORDER (OUTPATIENT)
Dept: GENERAL RADIOLOGY | Age: 82
End: 2022-06-28

## 2022-06-28 VITALS — WEIGHT: 148 LBS | BODY MASS INDEX: 22.43 KG/M2 | HEIGHT: 68 IN

## 2022-06-28 DIAGNOSIS — C34.11 MALIGNANT NEOPLASM OF UPPER LOBE OF RIGHT LUNG (HCC): ICD-10-CM

## 2022-06-28 DIAGNOSIS — E83.42 HYPOMAGNESEMIA: ICD-10-CM

## 2022-06-28 DIAGNOSIS — Z85.01 PERSONAL HISTORY OF MALIGNANT NEOPLASM OF ESOPHAGUS: ICD-10-CM

## 2022-06-28 DIAGNOSIS — Z85.01 PERSONAL HISTORY OF MALIGNANT NEOPLASM OF ESOPHAGUS: Primary | ICD-10-CM

## 2022-06-28 DIAGNOSIS — J91.0 MALIGNANT PLEURAL EFFUSION: ICD-10-CM

## 2022-06-28 DIAGNOSIS — G89.3 NEOPLASM RELATED PAIN: ICD-10-CM

## 2022-06-28 LAB
GLUCOSE BLD STRIP.AUTO-MCNC: 87 MG/DL (ref 65–117)
SERVICE CMNT-IMP: NORMAL

## 2022-06-28 PROCEDURE — 71046 X-RAY EXAM CHEST 2 VIEWS: CPT

## 2022-06-28 PROCEDURE — A9552 F18 FDG: HCPCS

## 2022-06-28 RX ORDER — FLUDEOXYGLUCOSE F-18 200 MCI/ML
10 INJECTION INTRAVENOUS ONCE
Status: COMPLETED | OUTPATIENT
Start: 2022-06-28 | End: 2022-06-28

## 2022-06-28 RX ADMIN — FLUDEOXYGLUCOSE F-18 9.92 MILLICURIE: 200 INJECTION INTRAVENOUS at 07:38

## 2022-06-29 RX ORDER — HYDROCHLOROTHIAZIDE 12.5 MG/1
TABLET ORAL
Qty: 45 TABLET | Refills: 0 | Status: SHIPPED | OUTPATIENT
Start: 2022-06-29 | End: 2022-09-02

## 2022-06-29 NOTE — TELEPHONE ENCOUNTER
PCP: Dillon Olguin MD    Last appt: 5/11/2022  Future Appointments   Date Time Provider Savannah Asencio   9/2/2022  8:30 AM Rose Hansen MD [facility] BS AMB       Requested Prescriptions     Pending Prescriptions Disp Refills    hydroCHLOROthiazide (HYDRODIURIL) 12.5 mg tablet 45 Tablet 0       Prior labs and Blood pressures:  BP Readings from Last 3 Encounters:   05/24/22 (!) 111/58   05/19/22 (!) 97/58   05/11/22 114/70     Lab Results   Component Value Date/Time    Sodium 136 05/04/2022 11:49 AM    Potassium 4.6 05/04/2022 11:49 AM    Chloride 102 05/04/2022 11:49 AM    CO2 27 05/04/2022 11:49 AM    Anion gap 7 05/04/2022 11:49 AM    Glucose 85 05/04/2022 11:49 AM    BUN 17 05/04/2022 11:49 AM    Creatinine 1.13 05/04/2022 11:49 AM    BUN/Creatinine ratio 15 05/04/2022 11:49 AM    GFR est AA >60 05/04/2022 11:49 AM    GFR est non-AA >60 05/04/2022 11:49 AM    Calcium 9.3 05/04/2022 11:49 AM

## 2022-07-07 ENCOUNTER — HOSPITAL ENCOUNTER (OUTPATIENT)
Dept: INTERVENTIONAL RADIOLOGY/VASCULAR | Age: 82
Discharge: HOME | End: 2022-07-07
Attending: INTERNAL MEDICINE
Payer: MEDICARE

## 2022-07-07 VITALS
HEART RATE: 80 BPM | WEIGHT: 148 LBS | TEMPERATURE: 98.3 F | OXYGEN SATURATION: 94 % | HEIGHT: 68 IN | DIASTOLIC BLOOD PRESSURE: 56 MMHG | BODY MASS INDEX: 22.43 KG/M2 | SYSTOLIC BLOOD PRESSURE: 90 MMHG | RESPIRATION RATE: 16 BRPM

## 2022-07-07 DIAGNOSIS — C15.9 MALIGNANT NEOPLASM OF ESOPHAGUS (HCC): ICD-10-CM

## 2022-07-07 PROCEDURE — C1892 INTRO/SHEATH,FIXED,PEEL-AWAY: HCPCS

## 2022-07-07 PROCEDURE — 74011250636 HC RX REV CODE- 250/636: Performed by: STUDENT IN AN ORGANIZED HEALTH CARE EDUCATION/TRAINING PROGRAM

## 2022-07-07 PROCEDURE — C1788 PORT, INDWELLING, IMP: HCPCS

## 2022-07-07 PROCEDURE — 77030010507 HC ADH SKN DERMBND J&J -B

## 2022-07-07 PROCEDURE — 2709999900 HC NON-CHARGEABLE SUPPLY

## 2022-07-07 PROCEDURE — 77030031139 HC SUT VCRL2 J&J -A

## 2022-07-07 PROCEDURE — 74011000250 HC RX REV CODE- 250: Performed by: STUDENT IN AN ORGANIZED HEALTH CARE EDUCATION/TRAINING PROGRAM

## 2022-07-07 PROCEDURE — 36590 REMOVAL TUNNELED CV CATH: CPT

## 2022-07-07 RX ORDER — LIDOCAINE HYDROCHLORIDE 20 MG/ML
10 INJECTION, SOLUTION INFILTRATION; PERINEURAL ONCE
Status: DISPENSED | OUTPATIENT
Start: 2022-07-07 | End: 2022-07-07

## 2022-07-07 RX ORDER — LIDOCAINE HYDROCHLORIDE 20 MG/ML
10 INJECTION, SOLUTION INFILTRATION; PERINEURAL ONCE
Status: COMPLETED | OUTPATIENT
Start: 2022-07-07 | End: 2022-07-07

## 2022-07-07 RX ORDER — FENTANYL CITRATE 50 UG/ML
25-100 INJECTION, SOLUTION INTRAMUSCULAR; INTRAVENOUS
Status: DISCONTINUED | OUTPATIENT
Start: 2022-07-07 | End: 2022-07-07

## 2022-07-07 RX ORDER — HEPARIN 100 UNIT/ML
300 SYRINGE INTRAVENOUS ONCE
Status: DISPENSED | OUTPATIENT
Start: 2022-07-07 | End: 2022-07-07

## 2022-07-07 RX ORDER — HEPARIN SODIUM 200 [USP'U]/100ML
400 INJECTION, SOLUTION INTRAVENOUS ONCE
Status: COMPLETED | OUTPATIENT
Start: 2022-07-07 | End: 2022-07-07

## 2022-07-07 RX ORDER — SODIUM CHLORIDE 9 MG/ML
25 INJECTION, SOLUTION INTRAVENOUS CONTINUOUS
Status: DISCONTINUED | OUTPATIENT
Start: 2022-07-07 | End: 2022-07-07

## 2022-07-07 RX ORDER — LIDOCAINE HYDROCHLORIDE AND EPINEPHRINE 10; 10 MG/ML; UG/ML
20 INJECTION, SOLUTION INFILTRATION; PERINEURAL ONCE
Status: COMPLETED | OUTPATIENT
Start: 2022-07-07 | End: 2022-07-07

## 2022-07-07 RX ORDER — MIDAZOLAM HYDROCHLORIDE 1 MG/ML
5 INJECTION, SOLUTION INTRAMUSCULAR; INTRAVENOUS
Status: DISCONTINUED | OUTPATIENT
Start: 2022-07-07 | End: 2022-07-07

## 2022-07-07 RX ADMIN — SODIUM CHLORIDE 25 ML/HR: 9 INJECTION, SOLUTION INTRAVENOUS at 08:26

## 2022-07-07 RX ADMIN — LIDOCAINE HYDROCHLORIDE 100 MG: 20 INJECTION, SOLUTION INFILTRATION; PERINEURAL at 09:38

## 2022-07-07 RX ADMIN — MIDAZOLAM 2 MG: 1 INJECTION INTRAMUSCULAR; INTRAVENOUS at 09:35

## 2022-07-07 RX ADMIN — LIDOCAINE HYDROCHLORIDE AND EPINEPHRINE 200 MG: 10; 10 INJECTION, SOLUTION INFILTRATION; PERINEURAL at 09:38

## 2022-07-07 RX ADMIN — FENTANYL CITRATE 50 MCG: 50 INJECTION, SOLUTION INTRAMUSCULAR; INTRAVENOUS at 09:36

## 2022-07-07 RX ADMIN — HEPARIN SODIUM IN SODIUM CHLORIDE 200 UNITS: 200 INJECTION INTRAVENOUS at 09:51

## 2022-07-07 NOTE — PROGRESS NOTES
Pt arrives ambulatory to IR department as an OP for a port removal. Port in right upper chest is infected and has been on 2 rounds of abx without much improvement. Katherine Books at bedside to discuss with pt concerns of placing a new port at this time. Plan is to remove port today and wait until his body is healed to place another port on the opposite side in the future. Pt reports having a left sided port in the past with no issues. Pt in agreement with MD at this time and consent completed.

## 2022-07-07 NOTE — PROGRESS NOTES
Patient discharged by Dr. Memo Walters. Patient provided with discharge instructions Rx and instructions on follow up care. Patient out of IR via wheelchair accompanied by RN.

## 2022-07-07 NOTE — PROGRESS NOTES
Name of procedure: Port removal     Sedation medications given: Yes    Versed: 2 mg    Fentanyl: 50 mcg    Sedation tolerated: Well    Total Sedation time: 10 minutes    Sedation start:  0935  Sedation end:  0945    Vital Signs: Stable     Fluids removed: No    Samples sent to lab: No    Any complications related to procedure:  No

## 2022-07-07 NOTE — H&P
Interventional and Vascular Radiology History and Physical    Patient: Remington Tiwari 80 y.o. 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    Esophageal cancer ([facility]     tx esophagectomy, chemo, radiation (complete )    GERD (gastroesophageal reflux disease)     GERD with stricture 2018    Status post dilatation    Hx of colonic polyps     Hyperkalemia 2019    Hypertension     Kidney cyst     CHAVEZ (nonalcoholic steatohepatitis)     \"fatty liver\" diet related    Personal history of colonic polyps 2012    Stage 3 chronic kidney disease ([facility] 2019    Thrombocytopenia (HCC)     r/t chemo    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 2018    Vitamin D deficiency 2018     Family History   Problem Relation Age of Onset    Diabetes Mother     OSTEOARTHRITIS Father     Cancer Sister         lung    Cancer Sister         breast     Social History     Socioeconomic History    Marital status:      Spouse name: Not on file    Number of children: Not on file    Years of education: Not on file    Highest education level: Not on file   Occupational History    Not on file   Tobacco Use    Smoking status: Former Smoker     Packs/day: 2.00     Years: 25.00     Pack years: 50.00     Quit date: 1987     Years since quittin.5    Smokeless tobacco: Never Used   Vaping Use    Vaping Use: Never used   Substance and Sexual Activity    Alcohol use: No    Drug use: No    Sexual activity: Not on file   Other Topics Concern    Not on file   Social History Narrative    Not on file     Social Determinants of Health     Financial Resource Strain:     Difficulty of Paying Living Expenses: Not on file   Food Insecurity:     Worried About Running Out of Food in the Last Year: Not on file    Jose of Food in the Last Year: Not on file   Transportation Needs:     Lack of Transportation (Medical): Not on file    Lack of Transportation (Non-Medical): Not on file   Physical Activity:     Days of Exercise per Week: Not on file    Minutes of Exercise per Session: Not on file   Stress:     Feeling of Stress : Not on file   Social Connections:     Frequency of Communication with Friends and Family: Not on file    Frequency of Social Gatherings with Friends and Family: Not on file    Attends Religion Services: Not on file    Active Member of [address] [place] [address] or Organizations: Not on file    Attends Club or Organization Meetings: Not on file    Marital Status: Not on file   Intimate Partner Violence:     Fear of Current or Ex-Partner: Not on file    Emotionally Abused: Not on file    Physically Abused: Not on file    Sexually Abused: Not on file   Housing Stability:     Unable to Pay for Housing in the Last Year: Not on file    Number of [place] in the Last Year: Not on file    Unstable Housing in the Last Year: Not on file       Allergies: No Known Allergies    Current Medications:  Current Outpatient Medications   Medication Sig    hydroCHLOROthiazide (HYDRODIURIL) 12.5 mg tablet TAKE 1 TABLET EVERY OTHER DAY    cholecalciferol, vitamin D3, (VITAMIN D3) 50 mcg (2,000 unit) tab Take 1 Tab by mouth daily.  lisinopril (PRINIVIL, ZESTRIL) 20 mg tablet Take 20 mg by mouth daily.  esomeprazole (NEXIUM) 40 mg capsule TAKE 1 CAPSULE DAILY    folic acid-vit R3-AGC S89 (FOLGARD RX) 2.2-25-1 mg tab Take 1 Tab by mouth daily.  VITAMINS A AND D PO Take 1 Cap by mouth daily.  sildenafil citrate (VIAGRA) 50 mg tablet Take 50 mg by mouth as needed.     CHOLESTYRAMINE/ASPARTAME (PREVALITE PO) Take 5.5 g by mouth every evening.  CYANOCOBALAMIN (VITAMIN B-12 PO) Take 1,000 mg by mouth daily.  pyridoxine (VITAMIN B-6) 100 mg tablet Take 100 mg by mouth daily.  MULTIVITAMINS (MULTIVITAMIN PO) Take 1 Tablet by mouth daily. Current Facility-Administered Medications   Medication Dose Route Frequency    fentaNYL citrate (PF) injection  mcg   mcg IntraVENous Rad Multiple    midazolam (VERSED) injection 5 mg  5 mg IntraVENous Multiple    0.9% sodium chloride infusion  25 mL/hr IntraVENous CONTINUOUS    heparin (porcine) pf 300 Units  300 Units InterCATHeter ONCE    heparinized saline 2 units/mL infusion 800 Units  400 mL Irrigation ONCE    lidocaine-EPINEPHrine (XYLOCAINE) 1 %-1:100,000 injection 200 mg  20 mL IntraDERMal ONCE    lidocaine (XYLOCAINE) 20 mg/mL (2 %) injection 200 mg  10 mL SubCUTAneous ONCE    lidocaine (XYLOCAINE) 20 mg/mL (2 %) injection 200 mg  10 mL SubCUTAneous ONCE        Physical Exam:  Blood pressure 115/76, pulse (!) 108, temperature 98.3 °F (36.8 °C), resp. rate 28, height 5' 8\" (1.727 m), weight 67.1 kg (148 lb), SpO2 99 %. No acute distress  Good peripheral perfusion  Nonlabored respirations  Abdomen nondistended    Alerts:    Hospital Problems  Date Reviewed: 3/1/2022    None          Laboratory:    No results for input(s): HGB, HCT, WBC, PLT, INR, BUN, CREA, K, CRCLT, HGBEXT, HCTEXT, PLTEXT, INREXT in the last 72 hours. No lab exists for component: PTT, PT      Plan of Care/Planned Procedure:  Risks, benefits, and alternatives reviewed with patient and he agrees to proceed with the procedure. Conscious sedation will be performed with IV fentanyl and versed.        Catie Masterson MD

## 2022-07-07 NOTE — DISCHARGE INSTRUCTIONS
[facility]  Special Procedures/Angiography Department    Radiologist: Dr. Amanda Kelly       Date: 7/7/2022        Portacath Removal Discharge Instructions      Watch for signs of infection:  redness, fever, chills, increased pain, and/or drainage from the site. If this occurs, call your physician at once. Keep your dressing clean and dry. Leave the dressing in place for the next  three days    Resume your previous diet and follow medication reconciliation form. Do not lift anything heavier than 5 pounds with the affected arm for the next week. You may take Tylenol, as directed on the label, for pain. Avoid ibuprofen (Advil, Motrin) and aspirin as they may cause you to bleed. Because you received sedation, you are not to drive or sign any legal documents for the next 24 hours.       If you have any questions or concerns, please call [phone] and ask for the nurse on-call

## 2022-08-10 ENCOUNTER — TRANSCRIBE ORDER (OUTPATIENT)
Dept: SCHEDULING | Age: 82
End: 2022-08-10

## 2022-08-10 DIAGNOSIS — D64.9 ANEMIA, UNSPECIFIED: ICD-10-CM

## 2022-08-10 DIAGNOSIS — R05.8 MILLERS' COUGH: ICD-10-CM

## 2022-08-10 DIAGNOSIS — G89.3 NEOPLASM RELATED PAIN: ICD-10-CM

## 2022-08-10 DIAGNOSIS — C34.11 MALIGNANT NEOPLASM OF UPPER LOBE OF RIGHT LUNG (HCC): Primary | ICD-10-CM

## 2022-08-10 DIAGNOSIS — Z85.01 PERSONAL HISTORY OF MALIGNANT NEOPLASM OF ESOPHAGUS: ICD-10-CM

## 2022-08-10 DIAGNOSIS — E83.42 HYPOMAGNESEMIA: ICD-10-CM

## 2022-08-10 DIAGNOSIS — J91.0 MALIGNANT PLEURAL EFFUSION: ICD-10-CM

## 2022-08-28 NOTE — PROGRESS NOTES
Subjective:     Chief Complaint   Patient presents with    [address] is a 80 y.o. M. He has a past medical history of prior esophageal cancer status postchemotherapy and radiation, completed in 2014, and a recent diagnosis of lung cancer. I reviewed and updated the medical record. This patient was seen in mid-May by one of my colleagues to follow-up on his CT scan that had been ordered after the patient had previously presented with shortness of breath and back pain. The CT scan had demonstrated a pleural effusion with a right upper lobe mass with associated lymphadenopathy and pulmonary nodules. Decreased breath sounds were noted at the right base. The patient was provided a referral to hematology/oncology. This was felt likely to be stage IIIb lung cancer. Over the past several weeks since that time, the patient has been seen for follow-up radiographic evaluation and for an outpatient visit in radiation oncology at [facility]. In early June, he was seen in radiation oncology, for his lung cancer. This was felt to be stage IIIb. The patient was noted to have just started systemic chemotherapy with carboplatinum, Abraxane, and Keytruda. His treatment was intended to be palliative at the time. Today, the patient comes in accompanied by his wife for establishment and follow-up on his chronic medical concerns. Since his initial stages of chemotherapy, he has been doing generally well. A Pleurx catheter has been introduced on the right side for periodic drainage of his malignant pleural effusion, and he continues to have this done about 3 times weekly. He denies having had any chest pain, recent fevers, redness around the catheter site, or shortness of breath or difficulty breathing. He says that his wife is typically responsible for maintaining dressing changes and keeping the site clean.   He continues on monotherapy with Keytruda at this point, with infusions scheduled every 3 weeks with his oncology team.  With this, he has been feeling generally better. He has not had any fevers or chills or other constitutional systemic complaints. Overall, his energy level is improving; they note that he had previously been scheduled for Procrit infusion, but that his right blood cell count was noted to have improved on its own without additional intervention. His functional status has been stable. He continues to walk regularly with his wife, and is able to perform most activities and instrumental activities of daily living, although he has needed her help more often now doing yard work. He is using nutritional supplements with boost and Ensure. Over the past several weeks, his blood pressure was noted to be low, even before the onset of chemotherapy. As result, his hydrochlorothiazide and lisinopril dosing had been discontinued. His blood pressure readings at home are now similar to where they are today in clinic. He denies having had any lightheadedness or dizziness, or any other focal neurological concerns. He continues on Nexium as needed for his heartburn, but says that he only uses it sparingly. His only other medications at this point are vitamins. His only other somatic complaint today is related by his wife has been some difficulty hearing out of his left ear. She has been using cotton swabs to try to help reduce the wax buildup, but still nevertheless he has had difficulty hearing out of his left side. No vertigo, tinnitus, or ear pain. His review of systems is otherwise negative. Physical examination demonstrates a thin elderly but pleasant and white male in no acute distress. There is a Pleurx catheter on the right anterior chest, with the insertion site without any evidence of erythema purulent drainage. There is no tenderness. Heart sounds are regular, without murmurs or gallops.   Lung sounds demonstrate dullness to percussion and decreased breath sounds on the right base, without egophony. This is consistent with his history of prior known malignant pleural effusion. Impacted cerumen is noted on the left ear.   The rest of the examination is nonfocal.    Past Medical History:  Past Medical History:   Diagnosis Date    Anemia     r/t chemo    Arthritis     BPH without obstruction/lower urinary tract symptoms 01/16/2018    Bronchogenic carcinoma of right lung ([address] 05/2022    bronchogenic carcinoma centered posteriorly in the right upper lobe obstructing the posterior segmental bronchus of the right upper lobe; Stage IIIB; systemic therapy consisting of carboplatinum, Abraxane and Keytruda    Diet-controlled diabetes mellitus ([address]     Diverticulosis     Drug-induced polyneuropathy ([address] 01/16/2018    Due to chemotherapy    GERD with stricture 01/16/2018    Status post dilatation    History of esophageal cancer 2008    tx esophagectomy, chemo, radiation (complete 2014)    Hx of colonic polyps     Hyperkalemia 04/29/2019    Kidney cyst     Malignant pleural effusion     With indwelling plerex catheter    CHAVEZ (nonalcoholic steatohepatitis) 2000    \"fatty liver\" diet related    Personal history of colonic polyps 12/06/2012    Primary hypertension     Stage 3 chronic kidney disease ([address] 04/29/2019    Thrombocytopenia ([address]     r/t chemo    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 01/16/2018    Vitamin D deficiency 01/16/2018       Past Surgical Histor:  Past Surgical History:   Procedure Laterality Date    COLONOSCOPY N/A 6/23/2016    COLONOSCOPY performed by Eugenia Villagomez MD at [facility] ENDOSCOPY    COLONOSCOPY N/A 7/2/2021    COLONOSCOPY performed by Denise Hartmann MD at [address] [place] [address]  7/2/2021         HX CATARACT REMOVAL Bilateral     HX CHOLECYSTECTOMY  9/29/11    HX ESOPHAGECTOMY  2008    HX HEMORRHOIDECTOMY      HX HERNIA REPAIR      HX ROTATOR CUFF REPAIR Right 2014    HX VASCULAR ACCESS Right 2008    port placed and removed in     IR INSERT CATH PLEURAL INDWELL  2022    IR INSERT TUNL CVC W PORT OVER 5 YEARS  2022    IR REMOVE TUNL CVAD W PORT/PUMP  2022    CO COLSC FLX W/RMVL OF TUMOR POLYP LESION SNARE TQ  2010         CO EGD BALLOON DILATION ESOPHAGUS <30 MM DIAM  3/3/2011         CO EGD BALLOON DILATION ESOPHAGUS <30 MM DIAM  2012         CO EGD TRANSORAL BIOPSY SINGLE/MULTIPLE  3/29/2012         UPPER GI ENDOSCOPY,BALL DIL,30MM  2018         UPPER GI ENDOSCOPY,BALL DIL,30MM  2020            Allergies:  No Known Allergies    Medications:  Current Outpatient Medications   Medication Sig Dispense Refill    carbamide peroxide (DEBROX) 6.5 % otic solution Administer 5 Drops into each ear two (2) times a day. 30 mL 1    cholecalciferol, vitamin D3, 50 mcg (2,000 unit) tab Take 1 Tab by mouth daily. esomeprazole (NEXIUM) 40 mg capsule TAKE 1 CAPSULE DAILY 90 Cap 1    VITAMINS A AND D PO Take 1 Cap by mouth daily. sildenafil citrate (VIAGRA) 50 mg tablet Take 50 mg by mouth as needed. CYANOCOBALAMIN (VITAMIN B-12 PO) Take 1,000 mg by mouth daily. pyridoxine, vitamin B6, (VITAMIN B-6) 100 mg tablet Take 100 mg by mouth daily. MULTIVITAMINS (MULTIVITAMIN PO) Take 1 Tablet by mouth daily.          Social History:  Social History     Socioeconomic History    Marital status:    Tobacco Use    Smoking status: Former     Packs/day: 2.00     Years: 25.00     Pack years: 50.00     Types: Cigarettes     Quit date: 1987     Years since quittin.6    Smokeless tobacco: Never   Vaping Use    Vaping Use: Never used   Substance and Sexual Activity    Alcohol use: No    Drug use: No       Family History:  Family History   Problem Relation Age of Onset    Diabetes Mother     OSTEOARTHRITIS Father     Cancer Sister         lung    Cancer Sister         breast       Immunizations:  Immunization History   Administered Date(s) Administered    COVID-19, PFIZER PURPLE top, DILUTE for use, (age 15 y+), IM, 30mcg/0.3mL 01/28/2021, 02/19/2021, 09/25/2021    Influenza High Dose Vaccine PF 09/01/2017    Influenza Vaccine 10/13/2015, 09/30/2016, 09/03/2020    Influenza Vaccine (Tri) Adjuvanted (>65 Yrs FLUAD TRI 33793) 09/26/2018, 10/11/2019    Influenza Vaccine Split 09/26/2011    Influenza, FLUARIX, FLULAVAL, [place]inka Mark (age 10 mo+) AND AFLURIA, (age 1 y+), PF, 0.5mL 10/06/2021    Pneumococcal Conjugate (PCV-13) 11/30/2015    Pneumococcal Polysaccharide (PPSV-23) 10/23/2008, 09/02/2009    Tdap 02/05/2019    Zoster Recombinant 03/18/2019, 05/25/2019        Healthcare Maintenance:  Health Maintenance   Topic Date Due    Foot Exam Q1  08/13/2020    Eye Exam Retinal or Dilated  03/21/2021    COVID-19 Vaccine (4 - Booster for Pfizer series) 01/25/2022    Flu Vaccine (1) 09/01/2022    A1C test (Diabetic or Prediabetic)  09/01/2022    MICROALBUMIN Q1  03/01/2023    Lipid Screen  03/01/2023    Depression Screen  03/29/2023    Medicare Yearly Exam  03/30/2023    DTaP/Tdap/Td series (2 - Td or Tdap) 02/05/2029    Shingrix Vaccine Age 50>  Completed    Pneumococcal 65+ years  Completed        Review of Systems:  ROS:  Review of Systems   Constitutional:  Positive for weight loss. HENT: Negative. Eyes: Negative. Respiratory: Negative. Cardiovascular: Negative. Gastrointestinal: Negative. Genitourinary: Negative. Musculoskeletal: Negative. Skin: Negative. Neurological: Negative. Endo/Heme/Allergies: Negative. Psychiatric/Behavioral: Negative. ROS otherwise negative      Objective:     Vital Signs:  Visit Vitals  /67 (BP 1 Location: Left upper arm, BP Patient Position: Sitting, BP Cuff Size: Adult)   Pulse 89   Temp 97.6 °F (36.4 °C) (Oral)   Resp 16   Ht 5' 9\" (1.753 m)   Wt 146 lb 3.2 oz (66.3 kg)   SpO2 96%   BMI 21.59 kg/m²       BMI:  Body mass index is 21.59 kg/m². Physical Examination:  Physical Exam  Vitals reviewed.    Constitutional: Appearance: Normal appearance. HENT:      Head: Normocephalic and atraumatic. Left Ear: There is impacted cerumen. Nose: Nose normal.      Mouth/Throat:      Mouth: Mucous membranes are moist.   Eyes:      Extraocular Movements: Extraocular movements intact. Conjunctiva/sclera: Conjunctivae normal.      Pupils: Pupils are equal, round, and reactive to light. Cardiovascular:      Rate and Rhythm: Normal rate and regular rhythm. Pulses: Normal pulses. Heart sounds: Normal heart sounds. No murmur heard. No friction rub. No gallop. Pulmonary:      Effort: Pulmonary effort is normal. No respiratory distress. Breath sounds: No wheezing, rhonchi or rales. Comments: Diminished R base as per HPI  Abdominal:      General: Bowel sounds are normal. There is no distension. Palpations: Abdomen is soft. There is no mass. Tenderness: There is no abdominal tenderness. There is no guarding or rebound. Musculoskeletal:         General: No tenderness, deformity or signs of injury. Normal range of motion. Cervical back: Normal range of motion and neck supple. Right lower leg: No edema. Left lower leg: No edema. Skin:     General: Skin is warm and dry. Findings: No bruising, lesion or rash. Neurological:      General: No focal deficit present. Mental Status: He is alert and oriented to person, place, and time. Mental status is at baseline. Cranial Nerves: No cranial nerve deficit. Sensory: No sensory deficit. Motor: No weakness.       Coordination: Coordination normal.      Gait: Gait normal.      Deep Tendon Reflexes: Reflexes normal.   Psychiatric:         Mood and Affect: Mood normal.         Behavior: Behavior normal.        Physical exam otherwise negative    Diagnostic Testing:    Laboratory Studies:  Hospital Outpatient Visit on 06/28/2022   Component Date Value Ref Range Status    Glucose (POC) 06/28/2022 87  65 - 117 mg/dL Final Comment: (NOTE)  The FDA has indicated that no capillary point of care blood glucose  monitoring systems are approved for use in \"critically ill\" patients,  however they have not defined this population. The College of  American Pathologists has recommended that these devices should not  be used in cases such as severe hypotension, dehydration, shock, and  hyperglycemic-hyperosmolar state, amongst others. Venous or arterial  collection is the recommended specimen for testing these patients. Performed by 06/28/2022 [address] Outpatient Visit on 05/26/2022   Component Date Value Ref Range Status    Glucose (POC) 05/26/2022 111  65 - 117 mg/dL Final    Comment: (NOTE)  The FDA has indicated that no capillary point of care blood glucose  monitoring systems are approved for use in \"critically ill\" patients,  however they have not defined this population. The College of  American Pathologists has recommended that these devices should not  be used in cases such as severe hypotension, dehydration, shock, and  hyperglycemic-hyperosmolar state, amongst others. Venous or arterial  collection is the recommended specimen for testing these patients. Performed by 05/26/2022 [id number] Outpatient Visit on 05/10/2022   Component Date Value Ref Range Status    Special Requests: 05/10/2022 NO SPECIAL REQUESTS    Final    GRAM STAIN 05/10/2022 RARE WBCS SEEN    Final    GRAM STAIN 05/10/2022 NO ORGANISMS SEEN    Final    Culture result: 05/10/2022 Culture performed on Fluid swab specimen NO GROWTH 4 DAYS    Final    BODY FLUID TYPE 05/10/2022 THORACENTESIS    Final    FLUID COLOR 05/10/2022 RED    Final    Comment: No reference range has been established. Recommend correlation with the clinical setting to determine the significance of these results. FLUID APPEARANCE 05/10/2022 TURBID    Final    Comment: No reference range has been established.   Recommend correlation with the clinical setting to determine the significance of these results. FLUID RBC CT. 05/10/2022 >100 (A) 0 /cu mm Final    Comment: No reference range has been established. Recommend correlation with the clinical setting to determine the significance of these results. FLUID NUCLEATED CELLS 05/10/2022 7,302  /cu mm Final    Comment: No reference range has been established. Recommend correlation with the clinical setting to determine the significance of these results. FLD NEUTROPHILS 05/10/2022 21 (A) NRRE % Final    FLD LYMPHS 05/10/2022 27 (A) NRRE % Final    FLD MONO/MACROPHAGES 05/10/2022 45 (A) NRRE % Final    FLD EOSINS 05/10/2022 4 (A) NRRE % Final    FLUID MESOTHELIAL 05/10/2022 3 (A) NRRE % Final    Fluid Type: 05/10/2022 THORACENTESIS    Final    Glucose, body fld. 05/10/2022 <1  MG/DL Final    Comment: This assay was performed on a sample type that is not validated by the . No reference range has been established. Recommend correlation with the clinical setting to determine the significance of these results. Fluid Type: 05/10/2022 THORACENTESIS    Final    Protein total, body fld. 05/10/2022 5.7  g/dL Final    Comment: This assay was performed on a sample type that is not validated by the . No reference range has been established. Recommend correlation with the clinical setting to determine the significance of these results. Fluid Type: 05/10/2022 THORACENTESIS    Final    LD, body fld. 05/10/2022 3,343  U/L Final    Comment: This assay was performed on a sample type that is not validated by the . No reference range has been established. Recommend correlation with the clinical setting to determine the significance of these results.      Office Visit on 05/04/2022   Component Date Value Ref Range Status    Sodium 05/04/2022 136  136 - 145 mmol/L Final    Potassium 05/04/2022 4.6  3.5 - 5.1 mmol/L Final    Chloride 05/04/2022 102  97 - 108 mmol/L Final    CO2 05/04/2022 27  21 - 32 mmol/L Final    Anion gap 05/04/2022 7  5 - 15 mmol/L Final    Glucose 05/04/2022 85  65 - 100 mg/dL Final    BUN 05/04/2022 17  6 - 20 MG/DL Final    Creatinine 05/04/2022 1.13  0.70 - 1.30 MG/DL Final    BUN/Creatinine ratio 05/04/2022 15  12 - 20   Final    GFR est AA 05/04/2022 >60  >60 ml/min/1.73m2 Final    GFR est non-AA 05/04/2022 >60  >60 ml/min/1.73m2 Final    Comment: Estimated GFR is calculated using the IDMS-traceable Modification of Diet in  Renal Disease (MDRD) Study equation, reported for both  Americans  (GFRAA) and non- Americans (GFRNA), and normalized to 1.73m2 body  surface area. The physician must decide which value applies to the patient. Calcium 05/04/2022 9.3  8.5 - 10.1 MG/DL Final    Bilirubin, total 05/04/2022 0.4  0.2 - 1.0 MG/DL Final    ALT (SGPT) 05/04/2022 20  12 - 78 U/L Final    AST (SGOT) 05/04/2022 30  15 - 37 U/L Final    Alk.  phosphatase 05/04/2022 101  45 - 117 U/L Final    Protein, total 05/04/2022 6.7  6.4 - 8.2 g/dL Final    Albumin 05/04/2022 3.2 (A) 3.5 - 5.0 g/dL Final    Globulin 05/04/2022 3.5  2.0 - 4.0 g/dL Final    A-G Ratio 05/04/2022 0.9 (A) 1.1 - 2.2   Final    WBC 05/04/2022 6.4  4.1 - 11.1 K/uL Final    RBC 05/04/2022 4.63  4.10 - 5.70 M/uL Final    HGB 05/04/2022 13.4  12.1 - 17.0 g/dL Final    HCT 05/04/2022 41.7  36.6 - 50.3 % Final    MCV 05/04/2022 90.1  80.0 - 99.0 FL Final    MCH 05/04/2022 28.9  26.0 - 34.0 PG Final    MCHC 05/04/2022 32.1  30.0 - 36.5 g/dL Final    RDW 05/04/2022 13.7  11.5 - 14.5 % Final    PLATELET 41/58/2152 070  150 - 400 K/uL Final    MPV 05/04/2022 11.1  8.9 - 12.9 FL Final    NRBC 05/04/2022 0.0  0  WBC Final    ABSOLUTE NRBC 05/04/2022 0.00  0.00 - 0.01 K/uL Final    NEUTROPHILS 05/04/2022 66  32 - 75 % Final    LYMPHOCYTES 05/04/2022 22  12 - 49 % Final    MONOCYTES 05/04/2022 10  5 - 13 % Final    EOSINOPHILS 05/04/2022 1  0 - 7 % Final    BASOPHILS 05/04/2022 1  0 - 1 % Final    IMMATURE GRANULOCYTES 05/04/2022 0  0.0 - 0.5 % Final    ABS. NEUTROPHILS 05/04/2022 4.2  1.8 - 8.0 K/UL Final    ABS. LYMPHOCYTES 05/04/2022 1.4  0.8 - 3.5 K/UL Final    ABS. MONOCYTES 05/04/2022 0.6  0.0 - 1.0 K/UL Final    ABS. EOSINOPHILS 05/04/2022 0.1  0.0 - 0.4 K/UL Final    ABS. BASOPHILS 05/04/2022 0.1  0.0 - 0.1 K/UL Final    ABS. IMM. GRANS. 05/04/2022 0.0  0.00 - 0.04 K/UL Final    DF 05/04/2022 AUTOMATED    Final   Office Visit on 03/01/2022   Component Date Value Ref Range Status    Vitamin D 25-Hydroxy 03/01/2022 36.5  30 - 100 ng/mL Final    Comment: (NOTE)  Deficiency               <20 ng/mL  Insufficiency          20-30 ng/mL  Sufficient             ng/mL  Possible toxicity       >100 ng/mL    The Method used is Siemens Advia Centaur currently standardized to a   Center of Disease Control and Prevention (CDC) certified reference   [address]. Samples containing fluorescein dye can produce falsely   elevated values when tested with the ADVIA Centaur Vitamin D Assay. It is recommended that results in the toxic range, >100 ng/mL, be   retested 72 hours post fluorescein exposure.       Color 03/01/2022 YELLOW/STRAW    Final    Color Reference Range: Straw, Yellow or Dark Yellow    Appearance 03/01/2022 CLEAR  CLEAR   Final    Specific gravity 03/01/2022 1.015  1.003 - 1.030   Final    pH (UA) 03/01/2022 5.0  5.0 - 8.0   Final    Protein 03/01/2022 Negative  Negative mg/dL Final    Glucose 03/01/2022 Negative  Negative mg/dL Final    Ketone 03/01/2022 Negative  Negative mg/dL Final    Bilirubin 03/01/2022 Negative  Negative   Final    Blood 03/01/2022 Negative  Negative   Final    Urobilinogen 03/01/2022 0.2  0.2 - 1.0 EU/dL Final    Nitrites 03/01/2022 Negative  Negative   Final    Leukocyte Esterase 03/01/2022 Negative  Negative   Final    UA:UC IF INDICATED 03/01/2022 CULTURE NOT INDICATED BY UA RESULT  CULTURE NOT INDICATED BY UA RESULT   Final    WBC 03/01/2022 0-4 0 - 4 /hpf Final    RBC 03/01/2022 0-5  0 - 5 /hpf Final    Epithelial cells 03/01/2022 FEW  FEW /lpf Final    Comment: Epithelial cell category consists of squamous cells and /or transitional  urothelial cells. Renal tubular cells, if present, are separately identified as  such. Bacteria 03/01/2022 Negative  Negative /hpf Final    Hyaline cast 03/01/2022 0-2  0 - 5 /lpf Final    TSH 03/01/2022 1.30  0.36 - 3.74 uIU/mL Final    Comment:   Due to TSH heterogeneity, both structurally and degree of glycosylation,  monoclonal antibodies used in the TSH assay may not accurately quantitate TSH. Therefore, this result should be correlated with clinical findings as well as  with other assessments of thyroid function, e.g., free T4, free T3. Sodium 03/01/2022 136  136 - 145 mmol/L Final    Potassium 03/01/2022 4.7  3.5 - 5.1 mmol/L Final    Chloride 03/01/2022 105  97 - 108 mmol/L Final    CO2 03/01/2022 28  21 - 32 mmol/L Final    Anion gap 03/01/2022 3 (A) 5 - 15 mmol/L Final    Glucose 03/01/2022 96  65 - 100 mg/dL Final    BUN 03/01/2022 24 (A) 6 - 20 MG/DL Final    Creatinine 03/01/2022 1.25  0.70 - 1.30 MG/DL Final    BUN/Creatinine ratio 03/01/2022 19  12 - 20   Final    GFR est AA 03/01/2022 >60  >60 ml/min/1.73m2 Final    GFR est non-AA 03/01/2022 55 (A) >60 ml/min/1.73m2 Final    Comment: Estimated GFR is calculated using the IDMS-traceable Modification of Diet in  Renal Disease (MDRD) Study equation, reported for both  Americans  (GFRAA) and non- Americans (GFRNA), and normalized to 1.73m2 body  surface area. The physician must decide which value applies to the patient. Calcium 03/01/2022 10.4 (A) 8.5 - 10.1 MG/DL Final    Bilirubin, total 03/01/2022 0.4  0.2 - 1.0 MG/DL Final    ALT (SGPT) 03/01/2022 20  12 - 78 U/L Final    AST (SGOT) 03/01/2022 25  15 - 37 U/L Final    Alk.  phosphatase 03/01/2022 98  45 - 117 U/L Final    Protein, total 03/01/2022 7.6  6.4 - 8.2 g/dL Final Albumin 03/01/2022 4.0  3.5 - 5.0 g/dL Final    Globulin 03/01/2022 3.6  2.0 - 4.0 g/dL Final    A-G Ratio 03/01/2022 1.1  1.1 - 2.2   Final    Cholesterol, total 03/01/2022 154  <200 MG/DL Final    Triglyceride 03/01/2022 86  <150 MG/DL Final    Comment: Based on NCEP-ATP III:  Triglycerides <150 mg/dL  is considered normal, 150-199  mg/dL  borderline high,  200-499 mg/dL high and  greater than or equal to 500  mg/dL very high. HDL Cholesterol 03/01/2022 49  MG/DL Final    Comment: Based on NCEP ATP III, HDL Cholesterol <40 mg/dL is considered low and >60  mg/dL is elevated.       LDL, calculated 03/01/2022 87.8  0 - 100 MG/DL Final    Comment: Based on the NCEP-ATP: LDL-C concentrations are considered  optimal <100 mg/dL,  near optimal/above Normal 100-129 mg/dL Borderline High: 130-159, High: 160-189  mg/dL Very High: Greater than or equal to 190 mg/dL      VLDL, calculated 03/01/2022 17.2  MG/DL Final    CHOL/HDL Ratio 03/01/2022 3.1  0.0 - 5.0   Final    Hemoglobin A1c 03/01/2022 6.0 (A) 4.0 - 5.6 % Final    Comment: NEW METHOD PLEASE NOTE NEW REFERENCE RANGE  (NOTE)  HbA1C Interpretive Ranges  <5.7              Normal  5.7 - 6.4         Consider Prediabetes  >6.5              Consider Diabetes      Est. average glucose 03/01/2022 126  mg/dL Final    WBC 03/01/2022 6.9  4.1 - 11.1 K/uL Final    RBC 03/01/2022 5.43  4.10 - 5.70 M/uL Final    HGB 03/01/2022 15.7  12.1 - 17.0 g/dL Final    HCT 03/01/2022 49.6  36.6 - 50.3 % Final    MCV 03/01/2022 91.3  80.0 - 99.0 FL Final    MCH 03/01/2022 28.9  26.0 - 34.0 PG Final    MCHC 03/01/2022 31.7  30.0 - 36.5 g/dL Final    RDW 03/01/2022 13.6  11.5 - 14.5 % Final    PLATELET 65/49/4359 985  150 - 400 K/uL Final    MPV 03/01/2022 11.2  8.9 - 12.9 FL Final    NRBC 03/01/2022 0.0  0  WBC Final    ABSOLUTE NRBC 03/01/2022 0.00  0.00 - 0.01 K/uL Final    NEUTROPHILS 03/01/2022 56  32 - 75 % Final    LYMPHOCYTES 03/01/2022 34  12 - 49 % Final    MONOCYTES 03/01/2022 8  5 - 13 % Final    EOSINOPHILS 03/01/2022 1  0 - 7 % Final    BASOPHILS 03/01/2022 1  0 - 1 % Final    IMMATURE GRANULOCYTES 03/01/2022 0  0.0 - 0.5 % Final    ABS. NEUTROPHILS 03/01/2022 3.8  1.8 - 8.0 K/UL Final    ABS. LYMPHOCYTES 03/01/2022 2.4  0.8 - 3.5 K/UL Final    ABS. MONOCYTES 03/01/2022 0.5  0.0 - 1.0 K/UL Final    ABS. EOSINOPHILS 03/01/2022 0.1  0.0 - 0.4 K/UL Final    ABS. BASOPHILS 03/01/2022 0.1  0.0 - 0.1 K/UL Final    ABS. IMM. GRANS. 03/01/2022 0.0  0.00 - 0.04 K/UL Final    DF 03/01/2022 AUTOMATED    Final    Microalbumin,urine random 03/01/2022 1.00  MG/DL Final    No reference range has been established. Creatinine, urine 03/01/2022 87.50  mg/dL Final    No reference range has been established. Microalbumin/Creat ratio (mg/g cre* 03/01/2022 11  0 - 30 mg/g Final         Radiographic Studies:  XR Results (most recent):  Results from Hospital Encounter encounter on 06/28/22    XR CHEST PA LAT    Narrative  Exam:  2 view chest    Indication: Personal history of esophageal carcinoma. COMPARISON: 5/20/2022    PA and lateral views demonstrate normal heart size. The Port-A-Cath overlies the  SVC. New right chest tube is in place. Right-sided pleural effusion is now small  and has decreased in the interval. There is improved aeration in the right lower  lobe. There is slight increased pleural thickening at the right lung apex  laterally. The pulmonary nodule in the left midlung zone now measures less than 1 cm has  decreased in the interval. Small pulmonary nodule in the right upper lung zone  appears stable. Linear opacities emanating from the right perihilar region likely scarring are  unchanged. There is an air-fluid level in the esophagus. The air-fluid level in the  midesophagus. Impression  1. Interval placement of right chest tube with decrease in right pleural  effusion and improved aeration in the right lung.   2. Small pulmonary nodule left midlung zone has decreased with persistent  pulmonary nodule right upper lung zone. 3. Air-fluid level in the midesophagus. SUNITHA Results (most recent):  No results found for this or any previous visit. CT Results (most recent):  Results from Hospital Encounter encounter on 05/10/22    CT CHEST W CONT    Narrative  INDICATION: Pain in thoracic spine, abnormal chest radiograph    COMPARISON: Chest radiograph 5/4/2022 and CT chest 12/20/2012    CONTRAST: None. TECHNIQUE:  5 mm axial images were obtained through the chest. Coronal and  sagittal reconstructions were generated. The absence of intravenous contrast  reduces the sensitivity for evaluation of the mediastinum and upper abdominal  organs. CT dose reduction was achieved through use of a standardized protocol  tailored for this examination and automatic exposure control for dose  modulation. FINDINGS:    THYROID: No nodule. MEDIASTINUM: There is a 8 mm lymph node in the pretracheal region which has  increased in the interval. There is a 7 mm subcarinal lymph node which has  increased in the interval.. AP window lymph node has also increased in size. ROMEL: There is a heterogeneous mass of adenopathy inferior to the right upper  lobe bronchus measuring approximately 2.5 cm. .  THORACIC AORTA: No aneurysm. MAIN PULMONARY ARTERY: Normal in caliber. TRACHEA/BRONCHI: The patient is status post gastric pull-through procedure. .  ESOPHAGUS: No wall thickening or dilatation. HEART: Normal in size. PLEURA: There is a large right pleural effusion occupying greater than 50% of  the right hemithorax. There is irregular enhancement of the pleura posteriorly  worrisome for pleural metastases. Mel Money LUNGS: There is a large heterogeneous central mass lesion centered in the  posterior aspect of the right upper lobe.  This obstructs the posterior segmental  bronchus and extends medially right upper lobe bronchus to the level of the  mainstem bronchus and posterior to the bronchus intermedius with mass effect on  this bronchus. This heterogeneous mass measures 4.6 x 4.4 cm and is consistent  with neoplasm. There is atelectasis posteriorly in the right upper lobe distal  to this mass. The right middle lobe is completely collapsed. The right lower  lobe is completely collapsed. These lobes are surrounded by the large pleural  fluid. Anteriorly in the right upper lobe is a spiculated subpleural nodule measuring  approximately 5 mm. Smaller subpleural nodule is seen anteriorly in the upper  lobe. Smooth thickening of interlobular septa in the right upper lobe suggest  lymphatic obstruction. There is a groundglass nodule anteriorly in the left upper lobe measuring 7 mm. There is a spiculated nodule measuring 7 mm laterally in the left upper lobe. .  INCIDENTALLY IMAGED UPPER ABDOMEN: Partially imaged is a large right renal cyst.  This measures greater than 13.5 cm and has increased in the interval. This is  only partially imaged. There is a left renal cyst medially. Further evaluation of this left renal cyst  is not necessary. The gallbladder has been removed. No adrenal lesions. Alan Salter BONES: No destructive bone lesion. There is osteopenia. There are severe  degenerative changes at the T9-T10 level. There is apparent fusion of the  vertebral bodies at T9-10 this has developed in the interval.    Impression  1. Findings are consistent with bronchogenic carcinoma centered posteriorly in  the right upper lobe obstructing the posterior segmental bronchus to the right  upper lobe is mass extends medially along the right upper lobe bronchus and  extends posterior to the right mainstem bronchus and bronchus intermedius. This  measures 4.6 x 4.4 cm and is consistent with bronchogenic carcinoma. There is  right hilar adenopathy. There are lymph nodes in the mediastinum and increased  in size in the interval when compared to previous examination.   2. Very large right pleural effusion probably on the basis of pleural  metastases. 3. Pulmonary nodules as described above with 2 nodules in the left upper lobe  and 2 nodules in the right upper lobe. 4. There is complete collapse of the right middle lobe and right lower lobe  probably due to the surrounding large pleural effusion. . 23X     DEXA Results (most recent):  No results found for this or any previous visit. MRI Results (most recent):  Results from [facility] encounter on 05/19/22    MRI BRAIN W WO CONT    Narrative  EXAM:  MRI BRAIN W WO CONT    INDICATION:    Lung Cancer Staging    COMPARISON:  None. CONTRAST: 15 cc IV ProHance    TECHNIQUE:  Multiplanar multisequence acquisition without and with contrast of the brain. FINDINGS:  Diffusion imaging does not show acute ischemic changes. There is no extra-axial fluid collection hemorrhage or shift. Voids in major vessels at the base of the brain are present. Minimal atrophy and white matter disease for patient's age. No enhancing lesion or masses her. Incidental prominent spondylosis at C3-C4 with some canal narrowing,  Incidental prominent sphenoid sinus disease. Impression  1. No acute findings no evidence to suggest metastases to the brain. Assessment/Plan:       ICD-10-CM ICD-9-CM    1. Malignant neoplasm of upper lobe of right lung (HCC)  C34.11 162.3       2. Essential hypertension  I10 401.9       3. Prediabetes  R73.03 790.29       4. Impacted cerumen of left ear  H61.22 380.4 carbamide peroxide (DEBROX) 6.5 % otic solution                 Lung Cancer:   - Ongoing treatment with carboplatin, Abraxane, Keytruda as per H/O. Now status post initial stages of chemotherapy and on Keytruda alone. 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 This does not appear to be complicated at this time by any evidence of infection or other abnormality.   I advised the patient that he should continue follow-up with [facility], and have close monitoring of his catheter site given the potential risk for infection. His overall functional status appears to be relatively stable. We had a discussion today about goals of therapy. They will continue with palliative immunotherapy for him with a focus on maintaining quality of life as much as possible. Essential Hypertension/Blood Pressure Management:   - Home BP Readings: usual 100/70   - Current Control: resolved   - Target BP: <140/90 mmHg   - Relevant BP Meds:  Key CAD CHF Meds       Patient is on no cardiovascular meds. - Plan: on no meds for BP and needs none at this time, dietary sodium restriction, regular aerobic exercise   - Notes: Blood pressure has been low ever since his diagnosis of cancer, and has remained low after initiation of chemotherapy. At this point, the patient does not require any medications for hypertension and this problem is largely resolved. Diabetes Mellitus:   - Type: Prediabetes   - Current A1C:   Lab Results   Component Value Date/Time    Hemoglobin A1c 6.0 (H) 03/01/2022 08:42 AM    Hemoglobin A1c (POC) 6.0 (A) 01/25/2018 09:37 AM       - Target T8B: <6%   - Complications: none   - Relevant Diabetes Medications:  Key Antihyperglycemic Medications       Patient is on no antihyperglycemic meds. The patient previously had prediabetes. If anything, I would expect that his A1c at this point would be improved. He continues to have laboratory monitoring at [facility], and his most recent set of laboratory studies in May had demonstrated normal glucose levels. We will defer additional laboratory testing at this time. He is not on any medications for this currently. Follow-up and Dispositions    Return in about 3 months (around 12/2/2022). I have reviewed the patient's medical history in detail and updated the computerized patient record.       We had a prolonged discussion about these complex clinical issues and went over the various important aspects to consider. All questions were answered. Advised the patient to call back or return to office if symptoms do not improve, change in nature, or persist.     The patient was given an after visit summary or informed of [employer] which includes patient instructions, diagnoses, current medications, & vitals. he expressed understanding with the diagnosis and plan. Deepika Bustamante MD    Please note that this dictation was completed with [employer], the computer voice recognition software. Quite often unanticipated grammatical, syntax, homophones, and other interpretive errors are inadvertently transcribed by the computer software. Please disregard these errors. Please excuse any errors that have escaped final proofreading.        This was a complex patient and total appointment time was at least 40 minutes, to include:  - review of medical record  - history gathering, physical examination, and review of systems  - medication reconciliation  - medical decision-making  - counseling on the plan of care

## 2022-09-02 ENCOUNTER — OFFICE VISIT (OUTPATIENT)
Dept: INTERNAL MEDICINE CLINIC | Age: 82
End: 2022-09-02
Payer: MEDICARE

## 2022-09-02 VITALS
SYSTOLIC BLOOD PRESSURE: 101 MMHG | OXYGEN SATURATION: 96 % | BODY MASS INDEX: 21.66 KG/M2 | TEMPERATURE: 97.6 F | HEIGHT: 69 IN | HEART RATE: 89 BPM | DIASTOLIC BLOOD PRESSURE: 67 MMHG | RESPIRATION RATE: 16 BRPM | WEIGHT: 146.2 LBS

## 2022-09-02 DIAGNOSIS — R73.03 PREDIABETES: ICD-10-CM

## 2022-09-02 DIAGNOSIS — I10 ESSENTIAL HYPERTENSION: ICD-10-CM

## 2022-09-02 DIAGNOSIS — H61.22 IMPACTED CERUMEN OF LEFT EAR: ICD-10-CM

## 2022-09-02 DIAGNOSIS — C34.11 MALIGNANT NEOPLASM OF UPPER LOBE OF RIGHT LUNG (HCC): Primary | ICD-10-CM

## 2022-09-02 PROCEDURE — 1123F ACP DISCUSS/DSCN MKR DOCD: CPT | Performed by: INTERNAL MEDICINE

## 2022-09-02 PROCEDURE — 99215 OFFICE O/P EST HI 40 MIN: CPT | Performed by: INTERNAL MEDICINE

## 2022-09-02 NOTE — PROGRESS NOTES
Chief Complaint   Patient presents with    Establish Care       1. \"Have you been to the ER, urgent care clinic since your last visit? Hospitalized since your last visit? \" No    2. \"Have you seen or consulted any other health care providers outside of the [address] [place] [address] since your last visit? \" No     3. For patients aged 39-70: Has the patient had a colonoscopy / FIT/ Cologuard? No      If the patient is female:    4. For patients aged 41-77: Has the patient had a mammogram within the past 2 years? No      5. For patients aged 21-65: Has the patient had a pap smear?  NA - based on age or sex

## 2022-09-19 ENCOUNTER — APPOINTMENT (OUTPATIENT)
Dept: CT IMAGING | Age: 82
End: 2022-09-19
Attending: STUDENT IN AN ORGANIZED HEALTH CARE EDUCATION/TRAINING PROGRAM
Payer: MEDICARE

## 2022-09-19 ENCOUNTER — HOSPITAL ENCOUNTER (EMERGENCY)
Age: 82
Discharge: HOME | End: 2022-09-19
Attending: STUDENT IN AN ORGANIZED HEALTH CARE EDUCATION/TRAINING PROGRAM
Payer: MEDICARE

## 2022-09-19 ENCOUNTER — APPOINTMENT (OUTPATIENT)
Dept: GENERAL RADIOLOGY | Age: 82
End: 2022-09-19
Attending: EMERGENCY MEDICINE
Payer: MEDICARE

## 2022-09-19 VITALS
OXYGEN SATURATION: 98 % | RESPIRATION RATE: 18 BRPM | BODY MASS INDEX: 21.84 KG/M2 | HEIGHT: 69 IN | TEMPERATURE: 97.4 F | HEART RATE: 110 BPM | SYSTOLIC BLOOD PRESSURE: 117 MMHG | DIASTOLIC BLOOD PRESSURE: 78 MMHG | WEIGHT: 147.49 LBS

## 2022-09-19 DIAGNOSIS — R42 LIGHTHEADEDNESS: Primary | ICD-10-CM

## 2022-09-19 LAB
ALBUMIN SERPL-MCNC: 2.9 G/DL (ref 3.5–5)
ALBUMIN/GLOB SERPL: 0.8 {RATIO} (ref 1.1–2.2)
ALP SERPL-CCNC: 95 U/L (ref 45–117)
ALT SERPL-CCNC: 18 U/L (ref 12–78)
ANION GAP SERPL CALC-SCNC: 3 MMOL/L (ref 5–15)
AST SERPL-CCNC: 24 U/L (ref 15–37)
ATRIAL RATE: 92 BPM
BASOPHILS # BLD: 0.1 K/UL (ref 0–0.1)
BASOPHILS NFR BLD: 1 % (ref 0–1)
BILIRUB SERPL-MCNC: 1 MG/DL (ref 0.2–1)
BNP SERPL-MCNC: 360 PG/ML
BUN SERPL-MCNC: 24 MG/DL (ref 6–20)
BUN/CREAT SERPL: 26 (ref 12–20)
CALCIUM SERPL-MCNC: 9.1 MG/DL (ref 8.5–10.1)
CALCULATED P AXIS, ECG09: 49 DEGREES
CALCULATED R AXIS, ECG10: 28 DEGREES
CALCULATED T AXIS, ECG11: 47 DEGREES
CHLORIDE SERPL-SCNC: 108 MMOL/L (ref 97–108)
CO2 SERPL-SCNC: 30 MMOL/L (ref 21–32)
COMMENT, HOLDF: NORMAL
CREAT SERPL-MCNC: 0.94 MG/DL (ref 0.7–1.3)
DIAGNOSIS, 93000: NORMAL
DIFFERENTIAL METHOD BLD: ABNORMAL
EOSINOPHIL # BLD: 0.3 K/UL (ref 0–0.4)
EOSINOPHIL NFR BLD: 5 % (ref 0–7)
ERYTHROCYTE [DISTWIDTH] IN BLOOD BY AUTOMATED COUNT: 13.6 % (ref 11.5–14.5)
GLOBULIN SER CALC-MCNC: 3.5 G/DL (ref 2–4)
GLUCOSE SERPL-MCNC: 112 MG/DL (ref 65–100)
HCT VFR BLD AUTO: 39.3 % (ref 36.6–50.3)
HGB BLD-MCNC: 12.5 G/DL (ref 12.1–17)
IMM GRANULOCYTES # BLD AUTO: 0 K/UL (ref 0–0.04)
IMM GRANULOCYTES NFR BLD AUTO: 0 % (ref 0–0.5)
LYMPHOCYTES # BLD: 1.7 K/UL (ref 0.8–3.5)
LYMPHOCYTES NFR BLD: 25 % (ref 12–49)
MAGNESIUM SERPL-MCNC: 1.7 MG/DL (ref 1.6–2.4)
MCH RBC QN AUTO: 31 PG (ref 26–34)
MCHC RBC AUTO-ENTMCNC: 31.8 G/DL (ref 30–36.5)
MCV RBC AUTO: 97.5 FL (ref 80–99)
MONOCYTES # BLD: 0.6 K/UL (ref 0–1)
MONOCYTES NFR BLD: 9 % (ref 5–13)
NEUTS SEG # BLD: 4 K/UL (ref 1.8–8)
NEUTS SEG NFR BLD: 60 % (ref 32–75)
NRBC # BLD: 0 K/UL (ref 0–0.01)
NRBC BLD-RTO: 0 PER 100 WBC
P-R INTERVAL, ECG05: 150 MS
PLATELET # BLD AUTO: 179 K/UL (ref 150–400)
PMV BLD AUTO: 10.8 FL (ref 8.9–12.9)
POTASSIUM SERPL-SCNC: 4.7 MMOL/L (ref 3.5–5.1)
PROT SERPL-MCNC: 6.4 G/DL (ref 6.4–8.2)
Q-T INTERVAL, ECG07: 352 MS
QRS DURATION, ECG06: 70 MS
QTC CALCULATION (BEZET), ECG08: 435 MS
RBC # BLD AUTO: 4.03 M/UL (ref 4.1–5.7)
SAMPLES BEING HELD,HOLD: NORMAL
SODIUM SERPL-SCNC: 141 MMOL/L (ref 136–145)
TROPONIN-HIGH SENSITIVITY: 10 NG/L (ref 0–76)
VENTRICULAR RATE, ECG03: 92 BPM
WBC # BLD AUTO: 6.6 K/UL (ref 4.1–11.1)

## 2022-09-19 PROCEDURE — 74011000636 HC RX REV CODE- 636: Performed by: STUDENT IN AN ORGANIZED HEALTH CARE EDUCATION/TRAINING PROGRAM

## 2022-09-19 PROCEDURE — 83880 ASSAY OF NATRIURETIC PEPTIDE: CPT

## 2022-09-19 PROCEDURE — 74011250636 HC RX REV CODE- 250/636: Performed by: STUDENT IN AN ORGANIZED HEALTH CARE EDUCATION/TRAINING PROGRAM

## 2022-09-19 PROCEDURE — 93005 ELECTROCARDIOGRAM TRACING: CPT

## 2022-09-19 PROCEDURE — 83735 ASSAY OF MAGNESIUM: CPT

## 2022-09-19 PROCEDURE — 84484 ASSAY OF TROPONIN QUANT: CPT

## 2022-09-19 PROCEDURE — 80053 COMPREHEN METABOLIC PANEL: CPT

## 2022-09-19 PROCEDURE — 71275 CT ANGIOGRAPHY CHEST: CPT

## 2022-09-19 PROCEDURE — 85025 COMPLETE CBC W/AUTO DIFF WBC: CPT

## 2022-09-19 PROCEDURE — 71045 X-RAY EXAM CHEST 1 VIEW: CPT

## 2022-09-19 PROCEDURE — 36415 COLL VENOUS BLD VENIPUNCTURE: CPT

## 2022-09-19 PROCEDURE — 99285 EMERGENCY DEPT VISIT HI MDM: CPT

## 2022-09-19 RX ADMIN — IOPAMIDOL 100 ML: 755 INJECTION, SOLUTION INTRAVENOUS at 16:06

## 2022-09-19 RX ADMIN — SODIUM CHLORIDE 500 ML: 900 INJECTION, SOLUTION INTRAVENOUS at 16:27

## 2022-09-19 NOTE — ED PROVIDER NOTES
EMERGENCY DEPARTMENT HISTORY AND PHYSICAL EXAM      Date: 9/19/2022  Patient Name: Andrea Gomez    History of Presenting Illness     Chief Complaint   Patient presents with    Fatigue     Pt ambulatory to triage d/t unsteadiness and generalized weakness intermittently x 3-4 days w/ intermittent dizziness. 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 Pt drainage became blood tinged about 1 week ago, his oncologist Dr Delia Rodriguez was concerned for potential clots in drain. History Provided By: Patient    Andrea Gomez, 80 y.o. male     70-year-old with history of lung cancer and chronic pleural drain on right side, patient states that about 1 week ago he is a change in color from straw-colored to bloody serosanguineous, states that for the same at a time he has felt worsening unsteadiness and generalized weakness, states that he was from his oncologist for further evaluation. Patient states that he is followed by Dr. Delia Rodriguez, currently is on injection medication for his lung cancer, states that he has been in his usual state of health, denies any fevers, chills, coughing, URI symptoms, denies any nausea or vomiting, changes in bowel or bladder habits, patient states that he has had no worsening shortness of breath with this change in his pleural drain and output, states it is about 300 to 700 cc/day, does endorse right-sided leg swelling for a few weeks, initially thought it was due to chemotherapy states it did not improve. Denies any numbness, tingling, weakness, describes his unsteadiness as       There are no other complaints, changes, or physical findings at this time. PCP: Neda Jones MD    No current facility-administered medications on file prior to encounter. Current Outpatient Medications on File Prior to Encounter   Medication Sig Dispense Refill    carbamide peroxide (DEBROX) 6.5 % otic solution Administer 5 Drops into each ear two (2) times a day.  30 mL 1 cholecalciferol, vitamin D3, 50 mcg (2,000 unit) tab Take 1 Tab by mouth daily. esomeprazole (NEXIUM) 40 mg capsule TAKE 1 CAPSULE DAILY 90 Cap 1    VITAMINS A AND D PO Take 1 Cap by mouth daily. sildenafil citrate (VIAGRA) 50 mg tablet Take 50 mg by mouth as needed. CYANOCOBALAMIN (VITAMIN B-12 PO) Take 1,000 mg by mouth daily. pyridoxine, vitamin B6, (VITAMIN B-6) 100 mg tablet Take 100 mg by mouth daily. MULTIVITAMINS (MULTIVITAMIN PO) Take 1 Tablet by mouth daily.          Past History     Past Medical History:  Past Medical History:   Diagnosis Date    Anemia     r/t chemo    Arthritis     BPH without obstruction/lower urinary tract symptoms 01/16/2018    Bronchogenic carcinoma of right lung ([address] 05/2022    bronchogenic carcinoma centered posteriorly in the right upper lobe obstructing the posterior segmental bronchus of the right upper lobe; Stage IIIB; systemic therapy consisting of carboplatinum, Abraxane and Keytruda    Diet-controlled diabetes mellitus ([address]     Diverticulosis     Drug-induced polyneuropathy ([address] 01/16/2018    Due to chemotherapy    GERD with stricture 01/16/2018    Status post dilatation    History of esophageal cancer 2008    tx esophagectomy, chemo, radiation (complete 2014)    Hx of colonic polyps     Hyperkalemia 04/29/2019    Kidney cyst     Malignant pleural effusion     With indwelling plerex catheter    CHAVEZ (nonalcoholic steatohepatitis) 2000    \"fatty liver\" diet related    Personal history of colonic polyps 12/06/2012    Primary hypertension     Stage 3 chronic kidney disease ([address] 04/29/2019    Thrombocytopenia ([address]     r/t chemo    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 01/16/2018    Vitamin D deficiency 01/16/2018       Past Surgical History:  Past Surgical History:   Procedure Laterality Date    COLONOSCOPY N/A 6/23/2016    COLONOSCOPY performed by Stacie Michael MD at [facility] ENDOSCOPY    COLONOSCOPY N/A 7/2/2021    COLONOSCOPY performed by Sara Ash MD at [address]  2021         HX CATARACT REMOVAL Bilateral     HX CHOLECYSTECTOMY  11    HX ESOPHAGECTOMY  2008    HX HEMORRHOIDECTOMY      HX HERNIA REPAIR      HX ROTATOR CUFF REPAIR Right 2014    HX VASCULAR ACCESS Right 2008    port placed and removed in 2014    IR INSERT CATH PLEURAL INDWELL  2022    IR INSERT TUNL CVC W PORT OVER 5 YEARS  2022    IR REMOVE TUNL CVAD W PORT/PUMP  2022    MI COLSC FLX W/RMVL OF TUMOR POLYP LESION SNARE TQ  2010         MI EGD BALLOON DILATION ESOPHAGUS <30 MM DIAM  3/3/2011         MI EGD BALLOON DILATION ESOPHAGUS <30 MM DIAM  2012         MI EGD TRANSORAL BIOPSY SINGLE/MULTIPLE  3/29/2012         UPPER GI ENDOSCOPY,BALL DIL,30MM  2018         UPPER GI ENDOSCOPY,BALL DIL,30MM  2020            Family History:  Family History   Problem Relation Age of Onset    Diabetes Mother     OSTEOARTHRITIS Father     Cancer Sister         lung    Cancer Sister         breast       Social History:  Social History     Tobacco Use    Smoking status: Former     Packs/day: 2.00     Years: 25.00     Pack years: 50.00     Types: Cigarettes     Quit date: 1987     Years since quittin.7    Smokeless tobacco: Never   Vaping Use    Vaping Use: Never used   Substance Use Topics    Alcohol use: No    Drug use: No       Allergies:  No Known Allergies      Review of Systems   Review of Systems   Constitutional:  Positive for activity change. Negative for chills, fatigue and fever. HENT:  Negative for congestion and sneezing. Respiratory:  Negative for cough and shortness of breath. Change in pleural fluid color   Cardiovascular:  Negative for chest pain and leg swelling. Gastrointestinal:  Negative for abdominal pain, constipation, diarrhea, nausea and vomiting. Genitourinary:  Negative for decreased urine volume, dysuria and frequency.    Musculoskeletal:  Negative for arthralgias and myalgias. Skin:  Negative for rash. Allergic/Immunologic: Negative for immunocompromised state. Neurological:  Positive for dizziness and light-headedness. Negative for headaches. Hematological:  Does not bruise/bleed easily. Psychiatric/Behavioral:  Negative for confusion. Physical Exam   Physical Exam  Vitals reviewed. Constitutional:       General: He is not in acute distress. Appearance: He is not ill-appearing, toxic-appearing or diaphoretic. HENT:      Head: Normocephalic and atraumatic. Mouth/Throat:      Mouth: Mucous membranes are moist.   Eyes:      Conjunctiva/sclera: Conjunctivae normal.   Cardiovascular:      Rate and Rhythm: Normal rate. Pulmonary:      Effort: Pulmonary effort is normal. No tachypnea, accessory muscle usage or respiratory distress. Breath sounds: No wheezing, rhonchi or rales. Comments: Patient with pleural tube in place, serosanguineous color of fluid, no overlying signs of infection or tenderness  Chest:      Chest wall: No tenderness. Abdominal:      Palpations: Abdomen is soft. Tenderness: There is no abdominal tenderness. There is no guarding or rebound. Musculoskeletal:      Cervical back: Neck supple. Right lower leg: No edema. Left lower leg: No edema. Skin:     General: Skin is warm and dry. Neurological:      General: No focal deficit present. Mental Status: He is alert.    Psychiatric:         Mood and Affect: Mood normal.       Diagnostic Study Results     Labs -     Recent Results (from the past 24 hour(s))   EKG, 12 LEAD, INITIAL    Collection Time: 09/19/22  1:41 PM   Result Value Ref Range    Ventricular Rate 92 BPM    Atrial Rate 92 BPM    P-R Interval 150 ms    QRS Duration 70 ms    Q-T Interval 352 ms    QTC Calculation (Bezet) 435 ms    Calculated P Axis 49 degrees    Calculated R Axis 28 degrees    Calculated T Axis 47 degrees    Diagnosis       Normal sinus rhythm  Low voltage QRS  When compared with ECG of 30-APR-2014 15:18,  Vent. rate has increased BY  32 BPM  Confirmed by Zander Johnson P.VIvonne ([id number]) on 9/19/2022 2:59:13 PM     CBC WITH AUTOMATED DIFF    Collection Time: 09/19/22  1:44 PM   Result Value Ref Range    WBC 6.6 4.1 - 11.1 K/uL    RBC 4.03 (L) 4.10 - 5.70 M/uL    HGB 12.5 12.1 - 17.0 g/dL    HCT 39.3 36.6 - 50.3 %    MCV 97.5 80.0 - 99.0 FL    MCH 31.0 26.0 - 34.0 PG    MCHC 31.8 30.0 - 36.5 g/dL    RDW 13.6 11.5 - 14.5 %    PLATELET 263 108 - 350 K/uL    MPV 10.8 8.9 - 12.9 FL    NRBC 0.0 0  WBC    ABSOLUTE NRBC 0.00 0.00 - 0.01 K/uL    NEUTROPHILS 60 32 - 75 %    LYMPHOCYTES 25 12 - 49 %    MONOCYTES 9 5 - 13 %    EOSINOPHILS 5 0 - 7 %    BASOPHILS 1 0 - 1 %    IMMATURE GRANULOCYTES 0 0.0 - 0.5 %    ABS. NEUTROPHILS 4.0 1.8 - 8.0 K/UL    ABS. LYMPHOCYTES 1.7 0.8 - 3.5 K/UL    ABS. MONOCYTES 0.6 0.0 - 1.0 K/UL    ABS. EOSINOPHILS 0.3 0.0 - 0.4 K/UL    ABS. BASOPHILS 0.1 0.0 - 0.1 K/UL    ABS. IMM. GRANS. 0.0 0.00 - 0.04 K/UL    DF AUTOMATED     METABOLIC PANEL, COMPREHENSIVE    Collection Time: 09/19/22  1:44 PM   Result Value Ref Range    Sodium 141 136 - 145 mmol/L    Potassium 4.7 3.5 - 5.1 mmol/L    Chloride 108 97 - 108 mmol/L    CO2 30 21 - 32 mmol/L    Anion gap 3 (L) 5 - 15 mmol/L    Glucose 112 (H) 65 - 100 mg/dL    BUN 24 (H) 6 - 20 MG/DL    Creatinine 0.94 0.70 - 1.30 MG/DL    BUN/Creatinine ratio 26 (H) 12 - 20      GFR est AA >60 >60 ml/min/1.73m2    GFR est non-AA >60 >60 ml/min/1.73m2    Calcium 9.1 8.5 - 10.1 MG/DL    Bilirubin, total 1.0 0.2 - 1.0 MG/DL    ALT (SGPT) 18 12 - 78 U/L    AST (SGOT) 24 15 - 37 U/L    Alk.  phosphatase 95 45 - 117 U/L    Protein, total 6.4 6.4 - 8.2 g/dL    Albumin 2.9 (L) 3.5 - 5.0 g/dL    Globulin 3.5 2.0 - 4.0 g/dL    A-G Ratio 0.8 (L) 1.1 - 2.2     SAMPLES BEING HELD    Collection Time: 09/19/22  1:44 PM   Result Value Ref Range    SAMPLES BEING HELD DRK GRN     COMMENT        Add-on orders for these samples will be processed based on acceptable specimen integrity and analyte stability, which may vary by analyte. Radiologic Studies -   XR CHEST PORT   Final Result   No pneumothorax with right chest tube in place. Residual small right   pleural effusion. No pulmonary nodules. Minimal right midlung subsegmental   atelectasis. CTA CHEST W OR W WO CONT    (Results Pending)     CT Results  (Last 48 hours)      None          CXR Results  (Last 48 hours)                 09/19/22 1423  XR CHEST PORT Final result    Impression:  No pneumothorax with right chest tube in place. Residual small right   pleural effusion. No pulmonary nodules. Minimal right midlung subsegmental   atelectasis. Narrative:  EXAM: XR CHEST PORT       INDICATION: Shortness of breath and pleural tube       COMPARISON: 6/28/2022       FINDINGS: A portable AP radiograph of the chest was obtained at 1418 hours. There is a right chest tube in place without evidence for pneumothorax. The   Port-A-Cath is no longer present. A small right pleural effusion is present. Lungs are hyperinflated. Previously described pulmonary nodules are   redemonstrated. A band of subsegmental atelectasis in the right midlung is   noted. . The cardiac and mediastinal contours and pulmonary vascularity are   stable. The bones and soft tissues are grossly within normal limits. Medical Decision Making   I am the first provider for this patient. I reviewed the vital signs, available nursing notes, past medical history, past surgical history, family history and social history. Vital Signs-Reviewed the patient's vital signs. Patient Vitals for the past 12 hrs:   Temp Pulse Resp BP SpO2   09/19/22 1515 -- -- -- 107/79 --   09/19/22 1457 -- -- -- 114/78 --   09/19/22 1334 97.4 °F (36.3 °C) (!) 110 18 123/86 98 %       Records Reviewed: Nursing records and medical records reviewed    Ddx: Fatigue, dehydration, PE    Initial assessment performed.  The patients presenting problems have been discussed, and they are in agreement with the care plan formulated and outlined with them. I have encouraged them to ask questions as they arise throughout their visit. MDM  Number of Diagnoses or Management Options  Diagnosis management comments: Is a 80-year-old with history of lung cancer presenting with concern of serosanguineous blood coming from pleural tube, patient states that it is too frequently straw-colored and over the past few weeks he has noticed this, also complaining of generalized weakness and intermittent lightheadedness over the past few weeks. States that today he felt worse and talked to his oncologist who recommended evaluation in the ER, patient presents stable, no apparent distress, vital signs within normal limits although mildly tachycardic, patient has no increased work of breathing, no hypoxia, no significant lung findings on physical exam, also complaining of mild right lower extremity swelling and with history of cancer and cardio with new complaints of fatigue and dyspnea on exertion we will plan on CT PE for further evaluation, will obtain basic lab work including CBC and BMP to evaluate for other causes of lightheadedness and fatigue as well as cardiac work-up, patient has pleural VAC which is draining appropriately, not becoming clogged, states that they have had the same out of output, will check hemoglobin for possible anemia with blood loss but otherwise fluid does not appear to be infected or significantly bleeding and do not feel that fluid needs to be sent at this time. Procedures        Disposition: Discharge Note:    The patient has been re-evaluated and is ready for discharge. Reviewed available results with patient. Counseled patient on diagnosis and care plan. Patient has expressed understanding, and all questions have been answered.  Patient agrees with plan and agrees to follow up as recommended, or to return to the ED if their symptoms worsen. Discharge instructions have been provided and explained to the patient, along with reasons to return to the ED. DISCHARGE PLAN:  1. Current Discharge Medication List        2. Follow-up Information       Follow up With Specialties Details Why Contact Info    Remington Wylie MD Internal Medicine Physician Schedule an appointment as soon as possible for a visit in 1 days  Brendon Romero [address] [place] [address]  [phone]      [facility] EMERGENCY DEPT Emergency Medicine  If symptoms worsen [address]  [address]  [phone]          3. Return to ED if worse     Diagnosis     Clinical Impression:   1. Lightheadedness        Attestations:    Bee Mir MD    Please note that this dictation was completed with [employer], the computer voice recognition software. Quite often unanticipated grammatical, syntax, homophones, and other interpretive errors are inadvertently transcribed by the computer software. Please disregard these errors. Please excuse any errors that have escaped final proofreading. Thank you.

## 2022-09-19 NOTE — DISCHARGE INSTRUCTIONS
Continue to drink lots of fluids at home, return to ER if you develop any chest pain, worsening shortness of breath , fever or new, concerning symptoms.   Please follow-up with your oncologist ongoing concerns of changes in pleural fluid

## 2022-09-19 NOTE — ED NOTES
DC papers reviewed and in hand, pt verbalized understanding. Ambulatory to lobby with family, no acute distress noted.

## 2022-09-30 ENCOUNTER — HOSPITAL ENCOUNTER (OUTPATIENT)
Dept: PET IMAGING | Age: 82
Discharge: HOME | End: 2022-09-30
Attending: INTERNAL MEDICINE
Payer: MEDICARE

## 2022-09-30 VITALS — BODY MASS INDEX: 22.28 KG/M2 | HEIGHT: 68 IN | WEIGHT: 147 LBS

## 2022-09-30 DIAGNOSIS — R05.8 MILLERS' COUGH: ICD-10-CM

## 2022-09-30 DIAGNOSIS — D64.9 ANEMIA, UNSPECIFIED: ICD-10-CM

## 2022-09-30 DIAGNOSIS — J91.0 MALIGNANT PLEURAL EFFUSION: ICD-10-CM

## 2022-09-30 DIAGNOSIS — Z85.01 PERSONAL HISTORY OF MALIGNANT NEOPLASM OF ESOPHAGUS: ICD-10-CM

## 2022-09-30 DIAGNOSIS — C34.11 MALIGNANT NEOPLASM OF UPPER LOBE OF RIGHT LUNG (HCC): ICD-10-CM

## 2022-09-30 DIAGNOSIS — E83.42 HYPOMAGNESEMIA: ICD-10-CM

## 2022-09-30 DIAGNOSIS — G89.3 NEOPLASM RELATED PAIN: ICD-10-CM

## 2022-09-30 LAB
GLUCOSE BLD STRIP.AUTO-MCNC: 78 MG/DL (ref 65–117)
SERVICE CMNT-IMP: NORMAL

## 2022-09-30 PROCEDURE — A9552 F18 FDG: HCPCS

## 2022-09-30 RX ORDER — FLUDEOXYGLUCOSE F-18 200 MCI/ML
10 INJECTION INTRAVENOUS ONCE
Status: COMPLETED | OUTPATIENT
Start: 2022-09-30 | End: 2022-09-30

## 2022-09-30 RX ADMIN — FLUDEOXYGLUCOSE F-18 9.85 MILLICURIE: 200 INJECTION INTRAVENOUS at 08:01

## 2022-10-06 ENCOUNTER — OFFICE VISIT (OUTPATIENT)
Dept: INTERNAL MEDICINE CLINIC | Age: 82
End: 2022-10-06
Payer: MEDICARE

## 2022-10-06 VITALS
HEART RATE: 95 BPM | HEIGHT: 69 IN | WEIGHT: 147 LBS | TEMPERATURE: 97.8 F | BODY MASS INDEX: 21.77 KG/M2 | OXYGEN SATURATION: 97 % | DIASTOLIC BLOOD PRESSURE: 70 MMHG | SYSTOLIC BLOOD PRESSURE: 102 MMHG | RESPIRATION RATE: 16 BRPM

## 2022-10-06 DIAGNOSIS — M10.071 ACUTE IDIOPATHIC GOUT INVOLVING TOE OF RIGHT FOOT: Primary | ICD-10-CM

## 2022-10-06 PROCEDURE — G8754 DIAS BP LESS 90: HCPCS | Performed by: INTERNAL MEDICINE

## 2022-10-06 PROCEDURE — 1101F PT FALLS ASSESS-DOCD LE1/YR: CPT | Performed by: INTERNAL MEDICINE

## 2022-10-06 PROCEDURE — 99213 OFFICE O/P EST LOW 20 MIN: CPT | Performed by: INTERNAL MEDICINE

## 2022-10-06 PROCEDURE — G8536 NO DOC ELDER MAL SCRN: HCPCS | Performed by: INTERNAL MEDICINE

## 2022-10-06 PROCEDURE — 1123F ACP DISCUSS/DSCN MKR DOCD: CPT | Performed by: INTERNAL MEDICINE

## 2022-10-06 PROCEDURE — G8427 DOCREV CUR MEDS BY ELIG CLIN: HCPCS | Performed by: INTERNAL MEDICINE

## 2022-10-06 PROCEDURE — G8752 SYS BP LESS 140: HCPCS | Performed by: INTERNAL MEDICINE

## 2022-10-06 PROCEDURE — G8510 SCR DEP NEG, NO PLAN REQD: HCPCS | Performed by: INTERNAL MEDICINE

## 2022-10-06 PROCEDURE — G8420 CALC BMI NORM PARAMETERS: HCPCS | Performed by: INTERNAL MEDICINE

## 2022-10-06 RX ORDER — INDOMETHACIN 25 MG/1
25 CAPSULE ORAL 3 TIMES DAILY
Qty: 30 CAPSULE | Refills: 0 | Status: SHIPPED | OUTPATIENT
Start: 2022-10-06 | End: 2022-10-16

## 2022-10-06 NOTE — PROGRESS NOTES
Subjective:   Virgen Adams is a 80 y.o. male      Chief Complaint   Patient presents with    Foot Swelling     Right foot swelling x 3 months        History of present illness: He is a patient of Dr. Abby Barfield who presents today for evaluation of swelling and tenderness of his right first toe that has been present for few days. He notes the foot he has had some proximal swelling from the chemotherapy but the hematologist/oncologist that this is not related to chemotherapy. He notes no history of trauma. There are no other complaints.     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 J38.01    Vitamin D deficiency E55.9    Hyperkalemia E87.5    Stage 3 chronic kidney disease (HCC) N18.30    Type 2 diabetes mellitus with chronic kidney disease ([address] E11.22      Past Medical History:   Diagnosis Date    Anemia     r/t chemo    Arthritis     BPH without obstruction/lower urinary tract symptoms 01/16/2018    Bronchogenic carcinoma of right lung ([address] 05/2022    bronchogenic carcinoma centered posteriorly in the right upper lobe obstructing the posterior segmental bronchus of the right upper lobe; Stage IIIB; systemic therapy consisting of carboplatinum, Abraxane and Keytruda    Diet-controlled diabetes mellitus ([address]     Diverticulosis     Drug-induced polyneuropathy ([address] 01/16/2018    Due to chemotherapy    GERD with stricture 01/16/2018    Status post dilatation    History of esophageal cancer 2008    tx esophagectomy, chemo, radiation (complete 2014)    Hx of colonic polyps     Hyperkalemia 2019    Kidney cyst     Malignant pleural effusion     With indwelling plerex catheter    CHAVEZ (nonalcoholic steatohepatitis)     \"fatty liver\" diet related    Personal history of colonic polyps 2012    Primary hypertension     Stage 3 chronic kidney disease [facility] 2019    Thrombocytopenia (HCC)     r/t chemo    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 2018    Vitamin D deficiency 2018      No Known Allergies   Family History   Problem Relation Age of Onset    Diabetes Mother     OSTEOARTHRITIS Father     Cancer Sister         lung    Cancer Sister         breast      Social History     Socioeconomic History    Marital status:      Spouse name: Not on file    Number of children: Not on file    Years of education: Not on file    Highest education level: Not on file   Occupational History    Not on file   Tobacco Use    Smoking status: Former     Packs/day: 2.00     Years: 25.00     Pack years: 50.00     Types: Cigarettes     Quit date: 1987     Years since quittin.7    Smokeless tobacco: Never   Vaping Use    Vaping Use: Never used   Substance and Sexual Activity    Alcohol use: No    Drug use: No    Sexual activity: Not on file   Other Topics Concern    Not on file   Social History Narrative    Not on file     Social Determinants of Health     Financial Resource Strain: Not on file   Food Insecurity: Not on file   Transportation Needs: Not on file   Physical Activity: Not on file   Stress: Not on file   Social Connections: Not on file   Intimate Partner Violence: Not on file   Housing Stability: Not on file     Prior to Admission medications    Medication Sig Start Date End Date Taking? Authorizing Provider   pembrolizumab [facility]) Every 3 weeks 22  Yes Provider, Historical   indomethacin (INDOCIN) 25 mg capsule Take 1 Capsule by mouth three (3) times daily for 10 days.  10/6/22 10/16/22 Yes Yoni Mccollum MD   carbamide peroxide [facility]) 6.5 % otic solution Administer 5 Drops into each ear two (2) times a day. 9/2/22  Yes Ryan Minor MD   cholecalciferol, vitamin D3, 50 mcg (2,000 unit) tab Take 1 Tab by mouth daily. Yes Provider, Historical   esomeprazole (NEXIUM) 40 mg capsule TAKE 1 CAPSULE DAILY 1/19/19  Yes Cristopher Hensley MD   sildenafil citrate (VIAGRA) 50 mg tablet Take 50 mg by mouth as needed. Yes Provider, Historical   CYANOCOBALAMIN (VITAMIN B-12 PO) Take 1,000 mg by mouth daily. 7/1/10  Yes Provider, Historical   pyridoxine, vitamin B6, (VITAMIN B-6) 100 mg tablet Take 100 mg by mouth daily. Yes Provider, Historical   MULTIVITAMINS (MULTIVITAMIN PO) Take 1 Tablet by mouth daily. 7/1/10  Yes Provider, Historical        Review of Systems              Constitutional:  He denies fever, weight loss, sweats or fatigue. EYES: No blurred or double vision,               ENT: no nasal congestion, no headache or dizziness. No difficulty with               swallowing, taste, speech or smell. Respiratory:  No cough, wheezing or shortness of breath. No sputum production. Cardiac:  Denies chest pain, palpitations, unexplained indigestion, syncope, edema, PND or orthopnea. GI:  No changes in bowel movements, no abdominal pain, no bloating, anorexia, nausea, vomiting or heartburn. :  No frequency or dysuria. Denies incontinence or sexual dysfunction. Extremities:  No joint pain, stiffness or swelling  Back:.no pain or soreness  Skin:  No recent rashes or mole changes. Neurological:  No numbness, tingling, burning paresthesias or loss of motor strength. No syncope, dizziness, frequent headaches or memory loss.   Hematologic:  No easy bruising  Lymphatic: No lymph node enlargement    Objective:     Vitals:    10/06/22 1029   BP: 102/70   Pulse: 95   Resp: 16   Temp: 97.8 °F (36.6 °C)   TempSrc: Oral   SpO2: 97%   Weight: 147 lb (66.7 kg)   Height: 5' 9\" (1.753 m)   PainSc:   3   PainLoc: Foot       Body mass index is 21.71 kg/m². Physical Examination:              General Appearance:  Well-developed, well-nourished, no acute distress. HEENT:      Ears:  The TMs and ear canals were clear. Eyes:  The pupillary responses were normal.  Extraocular muscle function intact. Lids and conjunctiva not injected. Funduscopic exam revealed sharp disc margins. Nares: Clear w/o edema or erythema  Pharynx:  Clear with teeth in good repair. No masses were noted. Neck:  Supple without thyromegaly or adenopathy. No JVD noted. No carotid                bruits. Lungs:  Clear to auscultation and percussion. Cardiac:  Regular rate and rhythm without murmur. PMI not displaced. No gallop, rub or click. Abdominal: Soft, non-tender, no hepata-spleenomegally or masses  Extremities:  No clubbing, cyanosis or edema. Skin:  No rash or unusual mole changes noted. Lymph Nodes:  None felt in the cervical, supraclavicular, axillary or inguinal region. Neurological: . DTRs 2+ and symmetric. Station and gait normal.   Hematologic:   No purpura or petechiae        Assessment/Plan:         1. Acute idiopathic gout involving toe of right foot        Impressions/Plan:  Impression  1. Gout involving right first toe we will treat with Indocin 25 3 times daily for 10 days he is to take with food. Recheck if not resolved. Otherwise follow-up per previous schedule. Orders Placed This Encounter    pembrolizumab (KEYTRUDA IV)    indomethacin (INDOCIN) 25 mg capsule       Follow-up and Dispositions    Return At previously scheduled appointment. No results found for any visits on 10/06/22. Yesica Arenas MD    The patient was given after the visit summary the patient verbalized an understanding of the plans and problems as explained.

## 2022-10-06 NOTE — PROGRESS NOTES
Chief Complaint   Patient presents with    Foot Swelling     Right foot swelling x 3 months     Visit Vitals  /70 (BP 1 Location: Left upper arm, BP Patient Position: Sitting, BP Cuff Size: Adult)   Pulse 95   Temp 97.8 °F (36.6 °C) (Oral)   Resp 16   Ht 5' 9\" (1.753 m)   Wt 147 lb (66.7 kg)   SpO2 97%   BMI 21.71 kg/m²     1. Have you been to the ER, urgent care clinic since your last visit? Hospitalized since your last visit? ED [facility] 9/19/22 for fluid/blood clot    2. Have you seen or consulted any other health care providers outside of the [address] [place] [address] since your last visit? Include any pap smears or colon screening.  Dr. Desmond Jama

## 2022-11-29 NOTE — PROGRESS NOTES
ICD-10-CM ICD-9-CM    1. Malignant neoplasm of upper lobe of right lung (HCC)  C34.11 162.3       2. Essential hypertension  I10 401.9       3. Dyspnea on exertion  R06.09 786.09                Subjective:     Chief Complaint   Patient presents with    Follow-up       Cyril Tavares is a 80 y.o. M. 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 I reviewed and updated the medical record. I saw this patient most recently in September for establishment. He was noted to be undergoing chemotherapy for his history of esophageal carcinoma, with a recent Pleurx catheter having been placed on the right side for management of a malignant pleural effusion. He was continuing with Keytruda. Physical examination at the time had been notable mainly for diminished breath sounds at the right base, consistent with his known history of malignant pleural effusion. No changes were made to his medication regimen; he was advised to continue follow-up with [facility]. More recently, the patient had been seen in early October by one of my colleagues for a complaint of swelling and tenderness of the right first toe consistent with clinical suspicion of gout. He was felt likely to have gout, and was provided NSAIDs. Today, the patient comes in for routine follow-up.   He reports feeling about the same as usual.  However, he reports that his oncology team had been concerned about report of headaches, and he is now pending MRI of his brain to rule out the possibility of metastatic disease; recent PET/CT had demonstrated persistently hypermetabolic findings, consistent with his known history of lung cancer, and he is also noted to have a longstanding history of malignant pleural effusion, which continues to be drained regularly by his oncology team, although this is notably improving and overall total size. He otherwise reports feeling about the same as usual.  He denies having had any recent fevers or chills, although he notes that he has had some increasing dyspnea on exertion over the past several months. As result, his oncology team has referred him to his cardiologist, and studies now are pending, to include potentially echocardiography as well as a PET/CT. He otherwise has not had any other significant systemic symptoms. His appetite has been somewhat decreased of late. He has been using protein supplements as much as he can but continues to lose a small amount of weight. His review of systems is otherwise negative. Routine Healthcare Maintenance issues are reviewed and discussed with the patient as noted below. Orders to update gaps in healthcare maintenance were placed as noted below in the Assessment and Plan, where applicable.      Past Medical History:  Past Medical History:   Diagnosis Date    Anemia     r/t chemo    Arthritis     BPH without obstruction/lower urinary tract symptoms 01/16/2018    Bronchogenic carcinoma of right lung ([address] 05/2022    bronchogenic carcinoma centered posteriorly in the right upper lobe obstructing the posterior segmental bronchus of the right upper lobe; Stage IIIB; systemic therapy consisting of carboplatinum, Abraxane and Keytruda    Diet-controlled diabetes mellitus ([address]     Diverticulosis     Drug-induced polyneuropathy ([address] 01/16/2018    Due to chemotherapy    GERD with stricture 01/16/2018    Status post dilatation    History of esophageal cancer 2008    tx esophagectomy, chemo, radiation (complete 2014)    History of gout     Hx of colonic polyps     Hyperkalemia 04/29/2019    Kidney cyst     Malignant pleural effusion     With indwelling plerex catheter    CHAVEZ (nonalcoholic steatohepatitis) 2000    \"fatty liver\" diet related    Personal history of colonic polyps 12/06/2012    Primary hypertension     Stage 3 chronic kidney disease ([facility] 04/29/2019    Thrombocytopenia ([facility]     r/t chemo    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 01/16/2018    Vitamin D deficiency 01/16/2018       Past Surgical Histor:  Past Surgical History:   Procedure Laterality Date    COLONOSCOPY N/A 6/23/2016    COLONOSCOPY performed by Ronn Rutledge MD at [facility] ENDOSCOPY    COLONOSCOPY N/A 7/2/2021    COLONOSCOPY performed by Federica Jack MD at [address]  7/2/2021         HX CATARACT REMOVAL Bilateral     HX CHOLECYSTECTOMY  9/29/11    HX ESOPHAGECTOMY  2008    HX HEMORRHOIDECTOMY      HX HERNIA REPAIR      HX ROTATOR CUFF REPAIR Right 2014    HX VASCULAR ACCESS Right 2008    port placed and removed in 2014    IR INSERT CATH PLEURAL INDWELL  5/24/2022    IR INSERT TUNL CVC W PORT OVER 5 YEARS  5/19/2022    IR REMOVE TUNL CVAD W PORT/PUMP  7/7/2022    LA COLSC FLX W/RMVL OF TUMOR POLYP LESION SNARE TQ  7/1/2010         LA EGD BALLOON DILATION ESOPHAGUS <30 MM DIAM  3/3/2011         LA EGD BALLOON DILATION ESOPHAGUS <30 MM DIAM  5/22/2012         LA EGD TRANSORAL BIOPSY SINGLE/MULTIPLE  3/29/2012         UPPER GI ENDOSCOPY,BALL DIL,30MM  1/11/2018         UPPER GI ENDOSCOPY,BALL DIL,30MM  1/31/2020            Allergies:  No Known Allergies    Medications:  Current Outpatient Medications   Medication Sig Dispense Refill    HYDROcodone-acetaminophen (NORCO) 5-325 mg per tablet       pembrolizumab (KEYTRUDA IV) Every 3 weeks      carbamide peroxide (DEBROX) 6.5 % otic solution Administer 5 Drops into each ear two (2) times a day. 30 mL 1    cholecalciferol, vitamin D3, 50 mcg (2,000 unit) tab Take 1 Tab by mouth daily. esomeprazole (NEXIUM) 40 mg capsule TAKE 1 CAPSULE DAILY 90 Cap 1    sildenafil citrate (VIAGRA) 50 mg tablet Take 50 mg by mouth as needed. CYANOCOBALAMIN (VITAMIN B-12 PO) Take 1,000 mg by mouth daily.       pyridoxine, vitamin B6, (VITAMIN B-6) 100 mg tablet Take 100 mg by mouth daily. MULTIVITAMINS (MULTIVITAMIN PO) Take 1 Tablet by mouth daily.          Social History:  Social History     Socioeconomic History    Marital status:    Tobacco Use    Smoking status: Former     Packs/day: 2.00     Years: 25.00     Pack years: 50.00     Types: Cigarettes     Quit date: 1987     Years since quittin.9    Smokeless tobacco: Never   Vaping Use    Vaping Use: Never used   Substance and Sexual Activity    Alcohol use: No    Drug use: No       Family History:  Family History   Problem Relation Age of Onset    Diabetes Mother     OSTEOARTHRITIS Father     Cancer Sister         lung    Cancer Sister         breast       Immunizations:  Immunization History   Administered Date(s) Administered    COVID-19, PFIZER PURPLE top, DILUTE for use, (age 15 y+), IM, 30mcg/0.3mL 2021, 2021, 2021    Influenza High Dose Vaccine PF 2017, 2022    Influenza Vaccine 10/13/2015, 2016, 2020    Influenza Vaccine (Tri) Adjuvanted (>65 Yrs FLUAD TRI 43898) 2018, 10/11/2019    Influenza Vaccine Split 2011    Influenza, FLUARIX, FLULAVAL, [place] (age 10 mo+) AND AFLURIA, (age 1 y+), PF, 0.5mL 10/06/2021    Pneumococcal Conjugate (PCV-13) 2015    Pneumococcal Polysaccharide (PPSV-23) 10/23/2008, 2009    Tdap 2019    Zoster Recombinant 2019, 2019        Healthcare Maintenance:  Health Maintenance   Topic Date Due    Foot Exam Q1  2020    Eye Exam Retinal or Dilated  2021    COVID-19 Vaccine (4 - Booster for Pfizer series) 2021    A1C test (Diabetic or Prediabetic)  2022    MICROALBUMIN Q1  2023    Lipid Screen  2023    Medicare Yearly Exam  2023    Depression Screen  10/06/2023    DTaP/Tdap/Td series (2 - Td or Tdap) 2029    Shingrix Vaccine Age 50>  Completed    Flu Vaccine  Completed    Pneumococcal 65+ years  Completed        Review of Systems:  ROS:  Review of Systems Constitutional: Negative. HENT: Negative. Eyes: Negative. Respiratory: Negative. Cardiovascular: Negative. Gastrointestinal: Negative. Genitourinary: Negative. Musculoskeletal: Negative. Skin: Negative. Neurological: Negative. Endo/Heme/Allergies: Negative. Psychiatric/Behavioral: Negative. ROS otherwise negative      Objective:     Vital Signs:  Visit Vitals  /78 (BP 1 Location: Right arm, BP Patient Position: Sitting, BP Cuff Size: Adult)   Pulse 98   Temp 98.3 °F (36.8 °C) (Oral)   Resp 16   Ht 5' 9\" (1.753 m)   Wt 145 lb 6.4 oz (66 kg)   SpO2 96%   BMI 21.47 kg/m²       BMI:  Body mass index is 21.47 kg/m². Physical Examination:  Physical Exam  Vitals reviewed. Constitutional:       Appearance: Normal appearance. HENT:      Head: Normocephalic and atraumatic. Nose: Nose normal.      Mouth/Throat:      Mouth: Mucous membranes are moist.   Eyes:      Extraocular Movements: Extraocular movements intact. Conjunctiva/sclera: Conjunctivae normal.      Pupils: Pupils are equal, round, and reactive to light. Cardiovascular:      Rate and Rhythm: Normal rate and regular rhythm. Pulses: Normal pulses. Heart sounds: Normal heart sounds. No murmur heard. No friction rub. No gallop. Pulmonary:      Effort: Pulmonary effort is normal. No respiratory distress. Breath sounds: Rhonchi present. No wheezing or rales. Comments: Diminished @ R base c/w known pleural effusion  Abdominal:      General: Bowel sounds are normal. There is no distension. Palpations: Abdomen is soft. There is no mass. Tenderness: There is no abdominal tenderness. There is no guarding or rebound. Musculoskeletal:         General: No tenderness, deformity or signs of injury. Normal range of motion. Cervical back: Normal range of motion and neck supple. Right lower leg: No edema. Left lower leg: No edema.    Skin:     General: Skin is warm and dry.      Findings: No bruising, lesion or rash. Neurological:      General: No focal deficit present. Mental Status: He is alert and oriented to person, place, and time. Mental status is at baseline. Cranial Nerves: No cranial nerve deficit. Sensory: No sensory deficit. Motor: No weakness. Coordination: Coordination normal.      Gait: Gait normal.      Deep Tendon Reflexes: Reflexes normal.   Psychiatric:         Mood and Affect: Mood normal.         Behavior: Behavior normal.        Physical exam otherwise negative    Diagnostic Testing:    Laboratory Studies:  Hospital Outpatient Visit on 09/30/2022   Component Date Value Ref Range Status    Glucose (POC) 09/30/2022 78  65 - 117 mg/dL Final    Comment: (NOTE)  The FDA has indicated that no capillary point of care blood glucose  monitoring systems are approved for use in \"critically ill\" patients,  however they have not defined this population. The College of  American Pathologists has recommended that these devices should not  be used in cases such as severe hypotension, dehydration, shock, and  hyperglycemic-hyperosmolar state, amongst others. Venous or arterial  collection is the recommended specimen for testing these patients. Performed by 09/30/2022 Be Villanueva   Final   Admission on 09/19/2022, Discharged on 09/19/2022   Component Date Value Ref Range Status    Ventricular Rate 09/19/2022 92  BPM Final    Atrial Rate 09/19/2022 92  BPM Final    P-R Interval 09/19/2022 150  ms Final    QRS Duration 09/19/2022 70  ms Final    Q-T Interval 09/19/2022 352  ms Final    QTC Calculation (Bezet) 09/19/2022 435  ms Final    Calculated P Axis 09/19/2022 49  degrees Final    Calculated R Axis 09/19/2022 28  degrees Final    Calculated T Axis 09/19/2022 47  degrees Final    Diagnosis 09/19/2022    Final                    Value:Normal sinus rhythm  Low voltage QRS  When compared with ECG of 30-APR-2014 15:18,  Vent.  rate has increased BY  32 BPM  Confirmed by Kimmie Yeboah PIvonneVIvonne ([id number]) on 9/19/2022 2:59:13 PM      WBC 09/19/2022 6.6  4.1 - 11.1 K/uL Final    RBC 09/19/2022 4.03 (A)  4.10 - 5.70 M/uL Final    HGB 09/19/2022 12.5  12.1 - 17.0 g/dL Final    HCT 09/19/2022 39.3  36.6 - 50.3 % Final    MCV 09/19/2022 97.5  80.0 - 99.0 FL Final    MCH 09/19/2022 31.0  26.0 - 34.0 PG Final    MCHC 09/19/2022 31.8  30.0 - 36.5 g/dL Final    RDW 09/19/2022 13.6  11.5 - 14.5 % Final    PLATELET 60/02/5955 395  150 - 400 K/uL Final    MPV 09/19/2022 10.8  8.9 - 12.9 FL Final    NRBC 09/19/2022 0.0  0  WBC Final    ABSOLUTE NRBC 09/19/2022 0.00  0.00 - 0.01 K/uL Final    NEUTROPHILS 09/19/2022 60  32 - 75 % Final    LYMPHOCYTES 09/19/2022 25  12 - 49 % Final    MONOCYTES 09/19/2022 9  5 - 13 % Final    EOSINOPHILS 09/19/2022 5  0 - 7 % Final    BASOPHILS 09/19/2022 1  0 - 1 % Final    IMMATURE GRANULOCYTES 09/19/2022 0  0.0 - 0.5 % Final    ABS. NEUTROPHILS 09/19/2022 4.0  1.8 - 8.0 K/UL Final    ABS. LYMPHOCYTES 09/19/2022 1.7  0.8 - 3.5 K/UL Final    ABS. MONOCYTES 09/19/2022 0.6  0.0 - 1.0 K/UL Final    ABS. EOSINOPHILS 09/19/2022 0.3  0.0 - 0.4 K/UL Final    ABS. BASOPHILS 09/19/2022 0.1  0.0 - 0.1 K/UL Final    ABS. IMM.  GRANS. 09/19/2022 0.0  0.00 - 0.04 K/UL Final    DF 09/19/2022 AUTOMATED    Final    Sodium 09/19/2022 141  136 - 145 mmol/L Final    Potassium 09/19/2022 4.7  3.5 - 5.1 mmol/L Final    Chloride 09/19/2022 108  97 - 108 mmol/L Final    CO2 09/19/2022 30  21 - 32 mmol/L Final    Anion gap 09/19/2022 3 (A)  5 - 15 mmol/L Final    Glucose 09/19/2022 112 (A)  65 - 100 mg/dL Final    BUN 09/19/2022 24 (A)  6 - 20 MG/DL Final    Creatinine 09/19/2022 0.94  0.70 - 1.30 MG/DL Final    BUN/Creatinine ratio 09/19/2022 26 (A)  12 - 20   Final    GFR est AA 09/19/2022 >60  >60 ml/min/1.73m2 Final    GFR est non-AA 09/19/2022 >60  >60 ml/min/1.73m2 Final    Estimated GFR is calculated using the IDMS-traceable Modification of Diet in Renal Disease (MDRD) Study equation, reported for both  Americans (GFRAA) and non- Americans (GFRNA), and normalized to 1.73m2 body surface area. The physician must decide which value applies to the patient. Calcium 09/19/2022 9.1  8.5 - 10.1 MG/DL Final    Bilirubin, total 09/19/2022 1.0  0.2 - 1.0 MG/DL Final    ALT (SGPT) 09/19/2022 18  12 - 78 U/L Final    AST (SGOT) 09/19/2022 24  15 - 37 U/L Final    Alk. phosphatase 09/19/2022 95  45 - 117 U/L Final    Protein, total 09/19/2022 6.4  6.4 - 8.2 g/dL Final    Albumin 09/19/2022 2.9 (A)  3.5 - 5.0 g/dL Final    Globulin 09/19/2022 3.5  2.0 - 4.0 g/dL Final    A-G Ratio 09/19/2022 0.8 (A)  1.1 - 2.2   Final    SAMPLES BEING HELD 09/19/2022 BRADEN AGUILERA   Final    COMMENT 09/19/2022 Add-on orders for these samples will be processed based on acceptable specimen integrity and analyte stability, which may vary by analyte. Final    NT pro-BNP 09/19/2022 360  <450 PG/ML Final    Comment:      NT-proBNP is highly sensitive for the detection of acute congestive heart failure in dyspneic patients. A NT-proBNP result <300 pg/mL has a negative predictive value of 98% for acute heart failure. Marked elevations in NT-proBNP levels may be observed in patients with left ventricular congestive failure, atrial fibrillation without clear heart failure, acute coronary syndromes, right heart strain/failure (including pulmonary embolism and cor pulmonale), critical illness, renal failure or advanced age. Falsely low NT-proBNP may be observed in obese CHF patients.        Recent research  suggests the following cutoff values are appropriate based on patient age and clinical setting, reduce false positive (FP) results, and improve positive predictive values for acute heart failure:  Acutely dyspneic patient: age <50, use cutoff of 450 pg/mL  Acutely dyspneic patient: age 54-65, use cutoff of 900 pg/mL  Acutely dyspneic patient: age                            >76, use cutoff of 1800 pg/mL       FDA approved cutpoints for ruling-out heart failure in the office:  Ambulatory patient: age <76, use cutoff of 125 pg/mL  Ambulatory patient: age >76, use cutoff of 450 pg/mL      Troponin-High Sensitivity 09/19/2022 10  0 - 76 ng/L Final    Comment: A HS troponin value change of (+ or -) 50% or more below the 99th percentile, in a 1/2/3 hr interval represents a significant change. Clinical correcation is recommended. A HS troponin value change of (+ or -) 20% or above the 99th percentile, in a 1/2/3 hr interval represents a significant change. Clinical correlation is recommended. 99th Percentile:   Women: 0-51 ng/L                                                                Men:   0-76 ng/L      Magnesium 09/19/2022 1.7  1.6 - 2.4 mg/dL Final   Hospital Outpatient Visit on 06/28/2022   Component Date Value Ref Range Status    Glucose (POC) 06/28/2022 87  65 - 117 mg/dL Final    Comment: (NOTE)  The FDA has indicated that no capillary point of care blood glucose  monitoring systems are approved for use in \"critically ill\" patients,  however they have not defined this population. The College of  American Pathologists has recommended that these devices should not  be used in cases such as severe hypotension, dehydration, shock, and  hyperglycemic-hyperosmolar state, amongst others. Venous or arterial  collection is the recommended specimen for testing these patients. Performed by 06/28/2022 Gloria Roach   Final         Radiographic Studies:  XR Results (most recent):  Results from Hospital Encounter encounter on 09/19/22    XR CHEST PORT    Narrative  EXAM: XR CHEST PORT    INDICATION: Shortness of breath and pleural tube    COMPARISON: 6/28/2022    FINDINGS: A portable AP radiograph of the chest was obtained at 1418 hours. There is a right chest tube in place without evidence for pneumothorax. The  Port-A-Cath is no longer present.  A small right pleural effusion is present. Lungs are hyperinflated. Previously described pulmonary nodules are  redemonstrated. A band of subsegmental atelectasis in the right midlung is  noted. . The cardiac and mediastinal contours and pulmonary vascularity are  stable. The bones and soft tissues are grossly within normal limits. Impression  No pneumothorax with right chest tube in place. Residual small right  pleural effusion. No pulmonary nodules. Minimal right midlung subsegmental  atelectasis. SUNITHA Results (most recent):  No results found for this or any previous visit. CT Results (most recent):  Results from Hospital Encounter encounter on 09/19/22    CTA CHEST W OR W WO CONT    Narrative  INDICATION: Fatigue, evaluate fro PE.    CT pulmonary angiogram is performed with 2.5 mm collimation. 100 mL of nonionic  IV Isovue-370 was administered for exam. 3D coronal and sagittal postprocessed  images were performed for this examination. CT dose reduction was achieved through use of a standardized protocol tailored  for this examination and automatic exposure control for dose modulation. Direct comparison is made to prior CT PET scan dated 5/26/2022. Chest:    CTPA: The pulmonary arteries are well opacified and there is no evidence of  pulmonary embolism. Lymph nodes: There is no axillary, mediastinal or hilar lymphadenopathy. Heart: The heart is normal in the size and there is no pericardial effusion. Lungs/pleura/lymph nodes: Medial posterior right upper lobe lesion measures  approximately 2.6 cm x 2.6 cm and measured approximately 4.4 cm x 4.6, prior CT  dated May 2022. There is a right sided chest tube. There is a small right  pleural effusion. There is right basilar consolidation, likely represent  compressive atelectasis. There is no pneumothorax There is trace left pleural  fluid and mild left basilar atelectasis. Right paratracheal lymph node is  unchanged measuring 2.5 cm.  There is a 5 mm nodule within the left upper lobe,  measuring 7 mm in size on prior CT dated May 2022. There are several right upper  lobe subcentimeter subpleural nodular densities. Bones: No lytic or sclerotic osseous lesion is visualized. Healed right-sided  rib fracture deformities are noted. Esophagus: The mid and distal esophagus is dilated, appears thickened and  contains fluid    Upper abdomen: There is incompletely visualized 14 cm right renal cyst. There is  a 2.3 cm left renal cyst. The gallbladder is surgically absent. The visualized  upper abdominal structures are otherwise normal.    Impression  1. No evidence of pulmonary embolism. 2. Right-sided chest tube. Small right pleural effusion. No pneumothorax. 3. Interval decrease in size of posterior right upper lobe lesion, now measuring  2.6 cm x 2.6 cm.  4. Interval decrease in size of left upper lobe pulmonary nodule, now measuring  5 mm in size. 5. Mid and distal esophageal dilatation, thickening and intraluminal fluid. Endoscopy can be performed for further evaluation, as indicated. DEXA Results (most recent):  No results found for this or any previous visit. MRI Results (most recent):  Results from [place] encounter on 05/19/22    MRI BRAIN W WO CONT    Narrative  EXAM:  MRI BRAIN W WO CONT    INDICATION:    Lung Cancer Staging    COMPARISON:  None. CONTRAST: 15 cc IV ProHance    TECHNIQUE:  Multiplanar multisequence acquisition without and with contrast of the brain. FINDINGS:  Diffusion imaging does not show acute ischemic changes. There is no extra-axial fluid collection hemorrhage or shift. Voids in major vessels at the base of the brain are present. Minimal atrophy and white matter disease for patient's age. No enhancing lesion or masses her. Incidental prominent spondylosis at C3-C4 with some canal narrowing,  Incidental prominent sphenoid sinus disease. Impression  1. No acute findings no evidence to suggest metastases to the brain. Assessment/Plan:       ICD-10-CM ICD-9-CM    1. Malignant neoplasm of upper lobe of right lung (HCC)  C34.11 162.3       2. Essential hypertension  I10 401.9       3. Dyspnea on exertion  R06.09 786.09              Lung cancer:  - Being treated by oncology with [facility] , deferring additional changes to their judgment, note pending follow-up with cardiology for complaint of dyspnea; note recent PET/CT with findings of persistent hypermetabolic areas, consistent with persistent disease            I have reviewed the patient's medical history in detail and updated the computerized patient record. We had a prolonged discussion about these complex clinical issues and went over the various important aspects to consider. All questions were answered. Advised the patient to call back or return to office if symptoms do not improve, change in nature, or persist.     The patient was given an after visit summary or informed of [employer] Access which includes patient instructions, diagnoses, current medications, & vitals. he expressed understanding with the diagnosis and plan. Trice Angelo MD    Please note that this dictation was completed with [employer] the computer voice recognition software. Quite often unanticipated grammatical, syntax, homophones, and other interpretive errors are inadvertently transcribed by the computer software. Please disregard these errors. Please excuse any errors that have escaped final proofreading.

## 2022-12-06 ENCOUNTER — TRANSCRIBE ORDER (OUTPATIENT)
Dept: SCHEDULING | Age: 82
End: 2022-12-06

## 2022-12-06 ENCOUNTER — OFFICE VISIT (OUTPATIENT)
Dept: INTERNAL MEDICINE CLINIC | Age: 82
End: 2022-12-06
Payer: MEDICARE

## 2022-12-06 VITALS
TEMPERATURE: 98.3 F | WEIGHT: 145.4 LBS | DIASTOLIC BLOOD PRESSURE: 78 MMHG | OXYGEN SATURATION: 96 % | RESPIRATION RATE: 16 BRPM | HEIGHT: 69 IN | SYSTOLIC BLOOD PRESSURE: 116 MMHG | HEART RATE: 98 BPM | BODY MASS INDEX: 21.53 KG/M2

## 2022-12-06 DIAGNOSIS — G89.3 NEOPLASM RELATED PAIN (ACUTE) (CHRONIC): ICD-10-CM

## 2022-12-06 DIAGNOSIS — D64.9 ANEMIA, UNSPECIFIED: ICD-10-CM

## 2022-12-06 DIAGNOSIS — T45.1X5A ANEMIA DUE TO ANTINEOPLASTIC CHEMOTHERAPY: ICD-10-CM

## 2022-12-06 DIAGNOSIS — J91.0 MALIGNANT PLEURAL EFFUSION: ICD-10-CM

## 2022-12-06 DIAGNOSIS — R53.0 NEOPLASTIC (MALIGNANT) RELATED FATIGUE: ICD-10-CM

## 2022-12-06 DIAGNOSIS — R05.8 OTHER SPECIFIED COUGH: ICD-10-CM

## 2022-12-06 DIAGNOSIS — C34.11 MALIGNANT NEOPLASM OF UPPER LOBE OF RIGHT LUNG (HCC): ICD-10-CM

## 2022-12-06 DIAGNOSIS — R06.09 DYSPNEA ON EXERTION: ICD-10-CM

## 2022-12-06 DIAGNOSIS — Z85.01 PERSONAL HISTORY OF MALIGNANT NEOPLASM OF ESOPHAGUS: Primary | ICD-10-CM

## 2022-12-06 DIAGNOSIS — D64.81 ANEMIA DUE TO ANTINEOPLASTIC CHEMOTHERAPY: ICD-10-CM

## 2022-12-06 DIAGNOSIS — I10 ESSENTIAL HYPERTENSION: ICD-10-CM

## 2022-12-06 DIAGNOSIS — C34.11 MALIGNANT NEOPLASM OF UPPER LOBE OF RIGHT LUNG (HCC): Primary | ICD-10-CM

## 2022-12-06 DIAGNOSIS — E83.42 HYPOMAGNESEMIA: ICD-10-CM

## 2022-12-06 PROCEDURE — 99213 OFFICE O/P EST LOW 20 MIN: CPT | Performed by: INTERNAL MEDICINE

## 2022-12-06 PROCEDURE — 3074F SYST BP LT 130 MM HG: CPT | Performed by: INTERNAL MEDICINE

## 2022-12-06 PROCEDURE — 3078F DIAST BP <80 MM HG: CPT | Performed by: INTERNAL MEDICINE

## 2022-12-06 PROCEDURE — 1123F ACP DISCUSS/DSCN MKR DOCD: CPT | Performed by: INTERNAL MEDICINE

## 2022-12-06 RX ORDER — HYDROCODONE BITARTRATE AND ACETAMINOPHEN 5; 325 MG/1; MG/1
TABLET ORAL
Status: ON HOLD | COMMUNITY
Start: 2022-05-20

## 2022-12-06 NOTE — PROGRESS NOTES
Chief Complaint   Patient presents with    Follow-up       1. \"Have you been to the ER, urgent care clinic since your last visit? Hospitalized since your last visit? \" No    2. \"Have you seen or consulted any other health care providers outside of the [address] [place] [address] since your last visit? \" No     3. For patients aged 39-70: Has the patient had a colonoscopy / FIT/ Cologuard? No      If the patient is female:    4. For patients aged 41-77: Has the patient had a mammogram within the past 2 years? No      5. For patients aged 21-65: Has the patient had a pap smear?  No

## 2022-12-09 ENCOUNTER — HOSPITAL ENCOUNTER (OUTPATIENT)
Dept: MRI IMAGING | Age: 82
DRG: 175 | End: 2022-12-09
Attending: INTERNAL MEDICINE
Payer: MEDICARE

## 2022-12-09 DIAGNOSIS — G89.3 NEOPLASM RELATED PAIN (ACUTE) (CHRONIC): ICD-10-CM

## 2022-12-09 DIAGNOSIS — D64.81 ANEMIA DUE TO ANTINEOPLASTIC CHEMOTHERAPY: ICD-10-CM

## 2022-12-09 DIAGNOSIS — Z85.01 PERSONAL HISTORY OF MALIGNANT NEOPLASM OF ESOPHAGUS: ICD-10-CM

## 2022-12-09 DIAGNOSIS — R05.8 OTHER SPECIFIED COUGH: ICD-10-CM

## 2022-12-09 DIAGNOSIS — R53.0 NEOPLASTIC (MALIGNANT) RELATED FATIGUE: ICD-10-CM

## 2022-12-09 DIAGNOSIS — T45.1X5A ANEMIA DUE TO ANTINEOPLASTIC CHEMOTHERAPY: ICD-10-CM

## 2022-12-09 DIAGNOSIS — C34.11 MALIGNANT NEOPLASM OF UPPER LOBE OF RIGHT LUNG (HCC): ICD-10-CM

## 2022-12-09 DIAGNOSIS — E83.42 HYPOMAGNESEMIA: ICD-10-CM

## 2022-12-09 DIAGNOSIS — J91.0 MALIGNANT PLEURAL EFFUSION: ICD-10-CM

## 2022-12-09 DIAGNOSIS — D64.9 ANEMIA, UNSPECIFIED: ICD-10-CM

## 2022-12-09 PROCEDURE — A9576 INJ PROHANCE MULTIPACK: HCPCS | Performed by: INTERNAL MEDICINE

## 2022-12-09 PROCEDURE — 74011250636 HC RX REV CODE- 250/636: Performed by: INTERNAL MEDICINE

## 2022-12-09 PROCEDURE — 70553 MRI BRAIN STEM W/O & W/DYE: CPT

## 2022-12-09 RX ADMIN — GADOTERIDOL 15 ML: 279.3 INJECTION, SOLUTION INTRAVENOUS at 11:00

## 2022-12-10 ENCOUNTER — HOSPITAL ENCOUNTER (INPATIENT)
Age: 82
LOS: 4 days | Discharge: HOME HEALTH | DRG: 175 | End: 2022-12-15
Attending: EMERGENCY MEDICINE | Admitting: HOSPITALIST
Payer: MEDICARE

## 2022-12-10 ENCOUNTER — APPOINTMENT (OUTPATIENT)
Dept: GENERAL RADIOLOGY | Age: 82
DRG: 175 | End: 2022-12-10
Attending: EMERGENCY MEDICINE
Payer: MEDICARE

## 2022-12-10 DIAGNOSIS — I26.94 MULTIPLE SUBSEGMENTAL PULMONARY EMBOLI WITHOUT ACUTE COR PULMONALE (HCC): ICD-10-CM

## 2022-12-10 DIAGNOSIS — J96.01 ACUTE RESPIRATORY FAILURE WITH HYPOXIA (HCC): Primary | ICD-10-CM

## 2022-12-10 DIAGNOSIS — J18.9 PNEUMONIA OF RIGHT LOWER LOBE DUE TO INFECTIOUS ORGANISM: ICD-10-CM

## 2022-12-10 DIAGNOSIS — R91.8 RIGHT LOWER LOBE LUNG MASS: ICD-10-CM

## 2022-12-10 LAB
ALBUMIN SERPL-MCNC: 2.4 G/DL (ref 3.5–5)
ALBUMIN/GLOB SERPL: 0.5 {RATIO} (ref 1.1–2.2)
ALP SERPL-CCNC: 115 U/L (ref 45–117)
ALT SERPL-CCNC: 20 U/L (ref 12–78)
ANION GAP SERPL CALC-SCNC: 6 MMOL/L (ref 5–15)
AST SERPL-CCNC: 31 U/L (ref 15–37)
BASOPHILS # BLD: 0 K/UL (ref 0–0.1)
BASOPHILS NFR BLD: 0 % (ref 0–1)
BILIRUB SERPL-MCNC: 0.3 MG/DL (ref 0.2–1)
BNP SERPL-MCNC: 584 PG/ML
BUN SERPL-MCNC: 22 MG/DL (ref 6–20)
BUN/CREAT SERPL: 23 (ref 12–20)
CALCIUM SERPL-MCNC: 9.3 MG/DL (ref 8.5–10.1)
CHLORIDE SERPL-SCNC: 102 MMOL/L (ref 97–108)
CO2 SERPL-SCNC: 28 MMOL/L (ref 21–32)
COMMENT, HOLDF: NORMAL
CREAT SERPL-MCNC: 0.96 MG/DL (ref 0.7–1.3)
DIFFERENTIAL METHOD BLD: ABNORMAL
EOSINOPHIL # BLD: 0.1 K/UL (ref 0–0.4)
EOSINOPHIL NFR BLD: 1 % (ref 0–7)
ERYTHROCYTE [DISTWIDTH] IN BLOOD BY AUTOMATED COUNT: 14.7 % (ref 11.5–14.5)
GLOBULIN SER CALC-MCNC: 4.7 G/DL (ref 2–4)
GLUCOSE SERPL-MCNC: 122 MG/DL (ref 65–100)
HCT VFR BLD AUTO: 38.5 % (ref 36.6–50.3)
HGB BLD-MCNC: 12.6 G/DL (ref 12.1–17)
IMM GRANULOCYTES # BLD AUTO: 0 K/UL (ref 0–0.04)
IMM GRANULOCYTES NFR BLD AUTO: 0 % (ref 0–0.5)
LACTATE BLD-SCNC: 1.79 MMOL/L (ref 0.4–2)
LYMPHOCYTES # BLD: 1.6 K/UL (ref 0.8–3.5)
LYMPHOCYTES NFR BLD: 13 % (ref 12–49)
MCH RBC QN AUTO: 27.6 PG (ref 26–34)
MCHC RBC AUTO-ENTMCNC: 32.7 G/DL (ref 30–36.5)
MCV RBC AUTO: 84.4 FL (ref 80–99)
MONOCYTES # BLD: 1 K/UL (ref 0–1)
MONOCYTES NFR BLD: 9 % (ref 5–13)
NEUTS SEG # BLD: 9.1 K/UL (ref 1.8–8)
NEUTS SEG NFR BLD: 77 % (ref 32–75)
NRBC # BLD: 0 K/UL (ref 0–0.01)
NRBC BLD-RTO: 0 PER 100 WBC
PLATELET # BLD AUTO: 280 K/UL (ref 150–400)
PMV BLD AUTO: 10.5 FL (ref 8.9–12.9)
POTASSIUM SERPL-SCNC: 4.5 MMOL/L (ref 3.5–5.1)
PROCALCITONIN SERPL-MCNC: 0.08 NG/ML
PROT SERPL-MCNC: 7.1 G/DL (ref 6.4–8.2)
RBC # BLD AUTO: 4.56 M/UL (ref 4.1–5.7)
SAMPLES BEING HELD,HOLD: NORMAL
SODIUM SERPL-SCNC: 136 MMOL/L (ref 136–145)
TROPONIN-HIGH SENSITIVITY: 179 NG/L (ref 0–76)
WBC # BLD AUTO: 12 K/UL (ref 4.1–11.1)

## 2022-12-10 PROCEDURE — 94640 AIRWAY INHALATION TREATMENT: CPT

## 2022-12-10 PROCEDURE — 96372 THER/PROPH/DIAG INJ SC/IM: CPT

## 2022-12-10 PROCEDURE — 94664 DEMO&/EVAL PT USE INHALER: CPT

## 2022-12-10 PROCEDURE — 96375 TX/PRO/DX INJ NEW DRUG ADDON: CPT

## 2022-12-10 PROCEDURE — 77010033678 HC OXYGEN DAILY

## 2022-12-10 PROCEDURE — 96374 THER/PROPH/DIAG INJ IV PUSH: CPT

## 2022-12-10 PROCEDURE — 71045 X-RAY EXAM CHEST 1 VIEW: CPT

## 2022-12-10 PROCEDURE — 93005 ELECTROCARDIOGRAM TRACING: CPT

## 2022-12-10 PROCEDURE — 84145 PROCALCITONIN (PCT): CPT

## 2022-12-10 PROCEDURE — 80053 COMPREHEN METABOLIC PANEL: CPT

## 2022-12-10 PROCEDURE — 74011000250 HC RX REV CODE- 250: Performed by: EMERGENCY MEDICINE

## 2022-12-10 PROCEDURE — 85025 COMPLETE CBC W/AUTO DIFF WBC: CPT

## 2022-12-10 PROCEDURE — 83880 ASSAY OF NATRIURETIC PEPTIDE: CPT

## 2022-12-10 PROCEDURE — 99285 EMERGENCY DEPT VISIT HI MDM: CPT

## 2022-12-10 PROCEDURE — 87040 BLOOD CULTURE FOR BACTERIA: CPT

## 2022-12-10 PROCEDURE — 83605 ASSAY OF LACTIC ACID: CPT

## 2022-12-10 PROCEDURE — 84484 ASSAY OF TROPONIN QUANT: CPT

## 2022-12-10 PROCEDURE — 74011250636 HC RX REV CODE- 250/636: Performed by: EMERGENCY MEDICINE

## 2022-12-10 PROCEDURE — 36415 COLL VENOUS BLD VENIPUNCTURE: CPT

## 2022-12-10 RX ORDER — ALBUTEROL SULFATE 0.83 MG/ML
15 SOLUTION RESPIRATORY (INHALATION)
Status: COMPLETED | OUTPATIENT
Start: 2022-12-10 | End: 2022-12-10

## 2022-12-10 RX ORDER — SODIUM CHLORIDE 0.9 % (FLUSH) 0.9 %
5-10 SYRINGE (ML) INJECTION AS NEEDED
Status: DISCONTINUED | OUTPATIENT
Start: 2022-12-10 | End: 2022-12-11

## 2022-12-10 RX ADMIN — ALBUTEROL SULFATE 15 MG: 2.5 SOLUTION RESPIRATORY (INHALATION) at 22:13

## 2022-12-10 RX ADMIN — SODIUM CHLORIDE 500 ML: 9 INJECTION, SOLUTION INTRAVENOUS at 22:13

## 2022-12-11 ENCOUNTER — APPOINTMENT (OUTPATIENT)
Dept: CT IMAGING | Age: 82
DRG: 175 | End: 2022-12-11
Attending: EMERGENCY MEDICINE
Payer: MEDICARE

## 2022-12-11 ENCOUNTER — APPOINTMENT (OUTPATIENT)
Dept: ULTRASOUND IMAGING | Age: 82
DRG: 175 | End: 2022-12-11
Attending: NURSE PRACTITIONER
Payer: MEDICARE

## 2022-12-11 PROBLEM — J96.01 ACUTE RESPIRATORY FAILURE WITH HYPOXIA (HCC): Status: ACTIVE | Noted: 2022-12-11

## 2022-12-11 LAB
ALBUMIN SERPL-MCNC: 2.3 G/DL (ref 3.5–5)
ALBUMIN/GLOB SERPL: 0.5 {RATIO} (ref 1.1–2.2)
ALP SERPL-CCNC: 109 U/L (ref 45–117)
ALT SERPL-CCNC: 20 U/L (ref 12–78)
ANION GAP SERPL CALC-SCNC: 8 MMOL/L (ref 5–15)
APPEARANCE UR: CLEAR
ARTERIAL PATENCY WRIST A: POSITIVE
AST SERPL-CCNC: 27 U/L (ref 15–37)
BACTERIA URNS QL MICRO: NEGATIVE /HPF
BASE DEFICIT BLD-SCNC: 2.6 MMOL/L
BASOPHILS # BLD: 0 K/UL (ref 0–0.1)
BASOPHILS NFR BLD: 0 % (ref 0–1)
BDY SITE: ABNORMAL
BILIRUB DIRECT SERPL-MCNC: 0.1 MG/DL (ref 0–0.2)
BILIRUB SERPL-MCNC: 0.5 MG/DL (ref 0.2–1)
BILIRUB UR QL: NEGATIVE
BUN SERPL-MCNC: 20 MG/DL (ref 6–20)
BUN/CREAT SERPL: 18 (ref 12–20)
CALCIUM SERPL-MCNC: 9.4 MG/DL (ref 8.5–10.1)
CHLORIDE SERPL-SCNC: 102 MMOL/L (ref 97–108)
CO2 SERPL-SCNC: 25 MMOL/L (ref 21–32)
COLOR UR: ABNORMAL
COVID-19 RAPID TEST, COVR: NOT DETECTED
CREAT SERPL-MCNC: 1.13 MG/DL (ref 0.7–1.3)
DIFFERENTIAL METHOD BLD: ABNORMAL
EOSINOPHIL # BLD: 0 K/UL (ref 0–0.4)
EOSINOPHIL NFR BLD: 0 % (ref 0–7)
EPITH CASTS URNS QL MICRO: ABNORMAL /LPF
ERYTHROCYTE [DISTWIDTH] IN BLOOD BY AUTOMATED COUNT: 14.7 % (ref 11.5–14.5)
FLUAV AG NPH QL IA: NEGATIVE
FLUBV AG NOSE QL IA: NEGATIVE
GAS FLOW.O2 O2 DELIVERY SYS: ABNORMAL L/MIN
GAS FLOW.O2 SETTING OXYMISER: 8 BPM
GLOBULIN SER CALC-MCNC: 4.7 G/DL (ref 2–4)
GLUCOSE BLD STRIP.AUTO-MCNC: 112 MG/DL (ref 65–117)
GLUCOSE BLD STRIP.AUTO-MCNC: 158 MG/DL (ref 65–117)
GLUCOSE SERPL-MCNC: 204 MG/DL (ref 65–100)
GLUCOSE UR STRIP.AUTO-MCNC: NEGATIVE MG/DL
HCO3 BLD-SCNC: 20.9 MMOL/L (ref 22–26)
HCT VFR BLD AUTO: 36.9 % (ref 36.6–50.3)
HGB BLD-MCNC: 12.2 G/DL (ref 12.1–17)
HGB UR QL STRIP: NEGATIVE
HYALINE CASTS URNS QL MICRO: ABNORMAL /LPF (ref 0–2)
IMM GRANULOCYTES # BLD AUTO: 0.1 K/UL (ref 0–0.04)
IMM GRANULOCYTES NFR BLD AUTO: 0 % (ref 0–0.5)
INR PPP: 1.1 (ref 0.9–1.1)
KETONES UR QL STRIP.AUTO: ABNORMAL MG/DL
LEUKOCYTE ESTERASE UR QL STRIP.AUTO: NEGATIVE
LYMPHOCYTES # BLD: 0.4 K/UL (ref 0.8–3.5)
LYMPHOCYTES NFR BLD: 2 % (ref 12–49)
MAGNESIUM SERPL-MCNC: 1.8 MG/DL (ref 1.6–2.4)
MCH RBC QN AUTO: 27.5 PG (ref 26–34)
MCHC RBC AUTO-ENTMCNC: 33.1 G/DL (ref 30–36.5)
MCV RBC AUTO: 83.1 FL (ref 80–99)
MONOCYTES # BLD: 0.1 K/UL (ref 0–1)
MONOCYTES NFR BLD: 1 % (ref 5–13)
NEUTS SEG # BLD: 15.4 K/UL (ref 1.8–8)
NEUTS SEG NFR BLD: 96 % (ref 32–75)
NITRITE UR QL STRIP.AUTO: NEGATIVE
NRBC # BLD: 0 K/UL (ref 0–0.01)
NRBC BLD-RTO: 0 PER 100 WBC
O2/TOTAL GAS SETTING VFR VENT: 50 %
PCO2 BLD: 31.6 MMHG (ref 35–45)
PEEP RESPIRATORY: 5 CMH2O
PH BLD: 7.43 [PH] (ref 7.35–7.45)
PH UR STRIP: 5 [PH] (ref 5–8)
PHOSPHATE SERPL-MCNC: 2.7 MG/DL (ref 2.6–4.7)
PIP ISTAT,IPIP: 12
PLATELET # BLD AUTO: 313 K/UL (ref 150–400)
PMV BLD AUTO: 10.3 FL (ref 8.9–12.9)
PO2 BLD: 78 MMHG (ref 80–100)
POTASSIUM SERPL-SCNC: 4.1 MMOL/L (ref 3.5–5.1)
PROT SERPL-MCNC: 7 G/DL (ref 6.4–8.2)
PROT UR STRIP-MCNC: NEGATIVE MG/DL
PROTHROMBIN TIME: 11.9 SEC (ref 9–11.1)
RBC # BLD AUTO: 4.44 M/UL (ref 4.1–5.7)
RBC #/AREA URNS HPF: ABNORMAL /HPF (ref 0–5)
SAO2 % BLD: 95.9 % (ref 92–97)
SERVICE CMNT-IMP: ABNORMAL
SERVICE CMNT-IMP: NORMAL
SODIUM SERPL-SCNC: 135 MMOL/L (ref 136–145)
SOURCE, COVRS: NORMAL
SP GR UR REFRACTOMETRY: 1.01
SPECIMEN TYPE: ABNORMAL
UA: UC IF INDICATED,UAUC: ABNORMAL
UROBILINOGEN UR QL STRIP.AUTO: 0.2 EU/DL (ref 0.2–1)
WBC # BLD AUTO: 16 K/UL (ref 4.1–11.1)
WBC URNS QL MICRO: ABNORMAL /HPF (ref 0–4)

## 2022-12-11 PROCEDURE — 82803 BLOOD GASES ANY COMBINATION: CPT

## 2022-12-11 PROCEDURE — 80076 HEPATIC FUNCTION PANEL: CPT

## 2022-12-11 PROCEDURE — 94660 CPAP INITIATION&MGMT: CPT

## 2022-12-11 PROCEDURE — 74011000258 HC RX REV CODE- 258: Performed by: NURSE PRACTITIONER

## 2022-12-11 PROCEDURE — 84100 ASSAY OF PHOSPHORUS: CPT

## 2022-12-11 PROCEDURE — 74011250637 HC RX REV CODE- 250/637: Performed by: GENERAL ACUTE CARE HOSPITAL

## 2022-12-11 PROCEDURE — 74011000258 HC RX REV CODE- 258: Performed by: INTERNAL MEDICINE

## 2022-12-11 PROCEDURE — 74011000636 HC RX REV CODE- 636: Performed by: EMERGENCY MEDICINE

## 2022-12-11 PROCEDURE — C9113 INJ PANTOPRAZOLE SODIUM, VIA: HCPCS | Performed by: NURSE PRACTITIONER

## 2022-12-11 PROCEDURE — 74011250636 HC RX REV CODE- 250/636: Performed by: EMERGENCY MEDICINE

## 2022-12-11 PROCEDURE — 36600 WITHDRAWAL OF ARTERIAL BLOOD: CPT

## 2022-12-11 PROCEDURE — 71275 CT ANGIOGRAPHY CHEST: CPT

## 2022-12-11 PROCEDURE — 87804 INFLUENZA ASSAY W/OPTIC: CPT

## 2022-12-11 PROCEDURE — 83735 ASSAY OF MAGNESIUM: CPT

## 2022-12-11 PROCEDURE — 74011000250 HC RX REV CODE- 250: Performed by: NURSE PRACTITIONER

## 2022-12-11 PROCEDURE — 93970 EXTREMITY STUDY: CPT

## 2022-12-11 PROCEDURE — 87635 SARS-COV-2 COVID-19 AMP PRB: CPT

## 2022-12-11 PROCEDURE — 87205 SMEAR GRAM STAIN: CPT

## 2022-12-11 PROCEDURE — 85025 COMPLETE CBC W/AUTO DIFF WBC: CPT

## 2022-12-11 PROCEDURE — 5A09357 ASSISTANCE WITH RESPIRATORY VENTILATION, LESS THAN 24 CONSECUTIVE HOURS, CONTINUOUS POSITIVE AIRWAY PRESSURE: ICD-10-PCS | Performed by: STUDENT IN AN ORGANIZED HEALTH CARE EDUCATION/TRAINING PROGRAM

## 2022-12-11 PROCEDURE — 74011250637 HC RX REV CODE- 250/637: Performed by: INTERNAL MEDICINE

## 2022-12-11 PROCEDURE — 74011250636 HC RX REV CODE- 250/636: Performed by: INTERNAL MEDICINE

## 2022-12-11 PROCEDURE — 74011000258 HC RX REV CODE- 258: Performed by: EMERGENCY MEDICINE

## 2022-12-11 PROCEDURE — 81001 URINALYSIS AUTO W/SCOPE: CPT

## 2022-12-11 PROCEDURE — 36415 COLL VENOUS BLD VENIPUNCTURE: CPT

## 2022-12-11 PROCEDURE — 82962 GLUCOSE BLOOD TEST: CPT

## 2022-12-11 PROCEDURE — 65270000046 HC RM TELEMETRY

## 2022-12-11 PROCEDURE — 80048 BASIC METABOLIC PNL TOTAL CA: CPT

## 2022-12-11 PROCEDURE — 74011250636 HC RX REV CODE- 250/636: Performed by: NURSE PRACTITIONER

## 2022-12-11 PROCEDURE — 85610 PROTHROMBIN TIME: CPT

## 2022-12-11 PROCEDURE — 77010033678 HC OXYGEN DAILY

## 2022-12-11 RX ORDER — VANCOMYCIN HYDROCHLORIDE
1250 ONCE
Status: COMPLETED | OUTPATIENT
Start: 2022-12-11 | End: 2022-12-11

## 2022-12-11 RX ORDER — ENOXAPARIN SODIUM 100 MG/ML
1 INJECTION SUBCUTANEOUS EVERY 12 HOURS
Status: DISCONTINUED | OUTPATIENT
Start: 2022-12-11 | End: 2022-12-11

## 2022-12-11 RX ORDER — SODIUM CHLORIDE 0.9 % (FLUSH) 0.9 %
5-40 SYRINGE (ML) INJECTION EVERY 8 HOURS
Status: DISCONTINUED | OUTPATIENT
Start: 2022-12-11 | End: 2022-12-15 | Stop reason: HOSPADM

## 2022-12-11 RX ORDER — MAGNESIUM SULFATE 100 %
4 CRYSTALS MISCELLANEOUS AS NEEDED
Status: DISCONTINUED | OUTPATIENT
Start: 2022-12-11 | End: 2022-12-15 | Stop reason: HOSPADM

## 2022-12-11 RX ORDER — ENOXAPARIN SODIUM 100 MG/ML
1 INJECTION SUBCUTANEOUS
Status: COMPLETED | OUTPATIENT
Start: 2022-12-11 | End: 2022-12-11

## 2022-12-11 RX ORDER — LANOLIN ALCOHOL/MO/W.PET/CERES
50 CREAM (GRAM) TOPICAL DAILY
Status: DISCONTINUED | OUTPATIENT
Start: 2022-12-12 | End: 2022-12-15 | Stop reason: HOSPADM

## 2022-12-11 RX ORDER — DEXTROSE MONOHYDRATE 100 MG/ML
0-250 INJECTION, SOLUTION INTRAVENOUS AS NEEDED
Status: DISCONTINUED | OUTPATIENT
Start: 2022-12-11 | End: 2022-12-15 | Stop reason: HOSPADM

## 2022-12-11 RX ORDER — ACETAMINOPHEN 650 MG/1
650 SUPPOSITORY RECTAL
Status: DISCONTINUED | OUTPATIENT
Start: 2022-12-11 | End: 2022-12-15 | Stop reason: HOSPADM

## 2022-12-11 RX ORDER — LANOLIN ALCOHOL/MO/W.PET/CERES
1000 CREAM (GRAM) TOPICAL DAILY
Status: DISCONTINUED | OUTPATIENT
Start: 2022-12-12 | End: 2022-12-15 | Stop reason: HOSPADM

## 2022-12-11 RX ORDER — IPRATROPIUM BROMIDE AND ALBUTEROL SULFATE 2.5; .5 MG/3ML; MG/3ML
3 SOLUTION RESPIRATORY (INHALATION)
Status: DISCONTINUED | OUTPATIENT
Start: 2022-12-11 | End: 2022-12-15 | Stop reason: HOSPADM

## 2022-12-11 RX ORDER — INSULIN LISPRO 100 [IU]/ML
INJECTION, SOLUTION INTRAVENOUS; SUBCUTANEOUS
Status: DISCONTINUED | OUTPATIENT
Start: 2022-12-11 | End: 2022-12-15 | Stop reason: HOSPADM

## 2022-12-11 RX ORDER — ACETAMINOPHEN 325 MG/1
650 TABLET ORAL
Status: DISCONTINUED | OUTPATIENT
Start: 2022-12-11 | End: 2022-12-15 | Stop reason: HOSPADM

## 2022-12-11 RX ORDER — SODIUM CHLORIDE 0.9 % (FLUSH) 0.9 %
5-40 SYRINGE (ML) INJECTION AS NEEDED
Status: DISCONTINUED | OUTPATIENT
Start: 2022-12-11 | End: 2022-12-15 | Stop reason: HOSPADM

## 2022-12-11 RX ORDER — POLYETHYLENE GLYCOL 3350 17 G/17G
17 POWDER, FOR SOLUTION ORAL DAILY PRN
Status: DISCONTINUED | OUTPATIENT
Start: 2022-12-11 | End: 2022-12-15 | Stop reason: HOSPADM

## 2022-12-11 RX ORDER — BALSAM PERU/CASTOR OIL
OINTMENT (GRAM) TOPICAL 2 TIMES DAILY
Status: DISCONTINUED | OUTPATIENT
Start: 2022-12-11 | End: 2022-12-15 | Stop reason: HOSPADM

## 2022-12-11 RX ADMIN — SODIUM CHLORIDE, PRESERVATIVE FREE 10 ML: 5 INJECTION INTRAVENOUS at 14:27

## 2022-12-11 RX ADMIN — SODIUM CHLORIDE 1 G: 900 INJECTION INTRAVENOUS at 00:22

## 2022-12-11 RX ADMIN — CEFEPIME 2 G: 2 INJECTION, POWDER, FOR SOLUTION INTRAVENOUS at 02:35

## 2022-12-11 RX ADMIN — SODIUM CHLORIDE, PRESERVATIVE FREE 10 ML: 5 INJECTION INTRAVENOUS at 06:00

## 2022-12-11 RX ADMIN — AZITHROMYCIN MONOHYDRATE 500 MG: 500 INJECTION, POWDER, LYOPHILIZED, FOR SOLUTION INTRAVENOUS at 22:04

## 2022-12-11 RX ADMIN — VANCOMYCIN HYDROCHLORIDE 1250 MG: 10 INJECTION, POWDER, LYOPHILIZED, FOR SOLUTION INTRAVENOUS at 06:41

## 2022-12-11 RX ADMIN — APIXABAN 10 MG: 5 TABLET, FILM COATED ORAL at 22:04

## 2022-12-11 RX ADMIN — ENOXAPARIN SODIUM 70 MG: 100 INJECTION SUBCUTANEOUS at 01:14

## 2022-12-11 RX ADMIN — SODIUM CHLORIDE, PRESERVATIVE FREE 40 MG: 5 INJECTION INTRAVENOUS at 08:14

## 2022-12-11 RX ADMIN — METHYLPREDNISOLONE SODIUM SUCCINATE 125 MG: 125 INJECTION, POWDER, FOR SOLUTION INTRAMUSCULAR; INTRAVENOUS at 00:22

## 2022-12-11 RX ADMIN — IOPAMIDOL 100 ML: 755 INJECTION, SOLUTION INTRAVENOUS at 00:07

## 2022-12-11 RX ADMIN — Medication 1 AMPULE: at 22:04

## 2022-12-11 RX ADMIN — APIXABAN 10 MG: 5 TABLET, FILM COATED ORAL at 11:40

## 2022-12-11 RX ADMIN — SODIUM CHLORIDE 500 ML: 9 INJECTION, SOLUTION INTRAVENOUS at 10:20

## 2022-12-11 RX ADMIN — CASTOR OIL AND BALSAM, PERU: 788; 87 OINTMENT TOPICAL at 22:04

## 2022-12-11 RX ADMIN — Medication 1 AMPULE: at 08:15

## 2022-12-11 RX ADMIN — CEFEPIME 2 G: 2 INJECTION, POWDER, FOR SOLUTION INTRAVENOUS at 14:27

## 2022-12-11 RX ADMIN — AZITHROMYCIN MONOHYDRATE 500 MG: 500 INJECTION, POWDER, LYOPHILIZED, FOR SOLUTION INTRAVENOUS at 01:10

## 2022-12-11 RX ADMIN — SODIUM CHLORIDE, PRESERVATIVE FREE 10 ML: 5 INJECTION INTRAVENOUS at 22:05

## 2022-12-11 NOTE — PROGRESS NOTES
Spiritual Care Assessment/Progress Note  [facility]      NAME: Naomi Ny      MRN: [id number]  AGE: 80 y.o.  SEX: male  Alevism Affiliation: Nondenominational   Language: English     12/11/2022     Total Time (in minutes): 10     Spiritual Assessment begun in [facility] CRITICAL CARE 2 through conversation with:         [x]Patient        [] Family    [] Friend(s)        Reason for Consult: Palliative Care, Initial/Spiritual Assessment     Spiritual beliefs: (Please include comment if needed)     [x] Identifies with a ness tradition:         [] Supported by a ness community:            [] Claims no spiritual orientation:           [] Seeking spiritual identity:                [] Adheres to an individual form of spirituality:           [] Not able to assess:                           Identified resources for coping:      [] Prayer                               [] Music                  [] Guided Imagery     [x] Family/friends                 [] Pet visits     [] Devotional reading                         [] Unknown     [] Other:                                              Interventions offered during this visit: (See comments for more details)    Patient Interventions: Initial/Spiritual assessment, Critical care, Coping skills reviewed/reinforced, Affirmation of ness, Iconic (affirming the presence of God/Higher Power), Prayer (actual)           Plan of Care:     [] Support spiritual and/or cultural needs    [] Support AMD and/or advance care planning process      [] Support grieving process   [] Coordinate Rites and/or Rituals    [] Coordination with community clergy   [] No spiritual needs identified at this time   [] Detailed Plan of Care below (See Comments)  [] Make referral to Music Therapy  [] Make referral to Pet Therapy     [] Make referral to Addiction services  [] Make referral to [facility]  [] Make referral to Spiritual Care Partner  [] No future visits requested        [x] Contact Spiritual Care for further referrals     Comments: Initial spiritual assessment with palliative pt in 2528. Pt appeared to be sleeping but awoke to 's voice and empathic touch. Greeted  pleasantly, self-reported to be coping well despite having heard tough news about a diagnosis earlier in the day. Pt expressed a positive outlook, holding on to ness. Pleased with care.  affirmed ness and acknowledged feelings with words of encouragement. Provided prayer and advised of availability of chaplains. Pt expressed appreciation. Contact Spiritual Care for any further referrals.  Shannan De Luna M.Div, [facility]   Paging Service [phone])

## 2022-12-11 NOTE — ED NOTES
Patient is being transferred to [facility] Critical Care 2, Room # [phone]. Report given to LEIGH Leos on [facility] for routine progression of care. Report consisted of the following information SBAR, Kardex, ED Summary, Intake/Output, MAR, Recent Results, and Cardiac Rhythm sinus tach . Patient transferred to receiving unit by: Shubham Marquez (RN or tech name). Outstanding consults needed: No     Next labs due: Yes    The following personal items will be sent with the patient during transfer to the floor:     All valuables:    Cardiac monitoring ordered: Yes    The following CURRENT information was reported to the receiving RN:    Code status: Full Code at time of transfer    Last set of vital signs:  Vital Signs  Level of Consciousness: Alert (0) (12/10/22 2208)  Temp: 97.8 °F (36.6 °C) (12/11/22 0231)  Temp Source: Oral (12/11/22 0231)  Pulse (Heart Rate): (!) 116 (12/11/22 0236)  Heart Rate Source: Monitor (12/11/22 0231)  Resp Rate: 23 (12/11/22 0236)  BP: 100/74 (12/11/22 0231)  MAP (Monitor): 83 (12/11/22 0231)  MAP (Calculated): 83 (12/11/22 0231)  BP 1 Location: Right upper arm (12/10/22 2208)  BP 1 Method: Automatic (12/10/22 2208)  BP Patient Position: At rest (12/10/22 2208)  MEWS Score: 3 (12/10/22 2208)         Oxygen Therapy  O2 Sat (%): 94 % (12/11/22 0236)  Pulse via Oximetry: 116 beats per minute (12/11/22 0236)  O2 Device: BIPAP (12/11/22 0141)  O2 Flow Rate (L/min): 4 l/min (12/10/22 2208)  FIO2 (%): 50 % (12/11/22 0115)      Last pain assessment:  Pain 1  Pain Scale 1: Numeric (0 - 10)  Pain Intensity 1: 0      Wounds: No     Urinary catheter: voiding  Is there a clay order: No     LDAs:       Peripheral IV Left Forearm (Active)       Peripheral IV 12/10/22 Right Forearm (Active)   Site Assessment Clean, dry, & intact 12/10/22 2349   Phlebitis Assessment 0 12/10/22 2349   Infiltration Assessment 0 12/10/22 2349   Dressing Status Clean, dry, & intact 12/10/22 2349   Dressing Type Transparent 12/10/22 2346   Hub Color/Line Status Pink 12/10/22 2349         Opportunity for questions and clarification was provided.     Chetna Cruz RN

## 2022-12-11 NOTE — PROGRESS NOTES
Hospitalist Progress Note    NAME: Nando Bowens   :  1940   MRN:  [id number]       Assessment / Plan:    Acute hypoxic respiratory failure due to Pulmonary emboli + RLL PNA  Left femoral DVT  -CTA chest done and showed acute pulmonary embolism in the left lower lobar and left upper lobe segmental pulmonary artery levels, increased size of right lung masses and right lower lobe pneumonia  -Off  Bipap  -Procal Neg  -trop 179,   -covid and flu negative  -goal SPO2 92-97%  -s/p 70mg Lovenox in ED, continue with therapeutic Lovenox (1mg/kg BID) for PE  -TTE to r/o rt heart strain  -cefepime, vanc, and azithromycin for broad spectrum coverage of RLL PNA  -duonebs Q 4 hour prn     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 Code status: DNR  Prophylaxis: Lovenox  Recommended Disposition:  TBD   Anticipated Discharge Date:  >48 hours        Subjective:     Discussed with RN events overnight.    On 2 L/m  No CP  No SOB  Afebrile  No leg pain  No bleeding issues  BP soft, no dizziness reported     Review of Systems:  Symptom Y/N Comments  Symptom Y/N Comments   Fever/Chills    Chest Pain     Poor Appetite    Edema     Cough    Abdominal Pain     Sputum    Joint Pain     SOB/GREGG    Pruritis/Rash Nausea/vomit    Tolerating PT/OT     Diarrhea    Tolerating Diet     Constipation    Other       PO intake: No data found. Wt Readings from Last 10 Encounters:   12/11/22 65.3 kg (143 lb 14.4 oz)   12/06/22 66 kg (145 lb 6.4 oz)   10/06/22 66.7 kg (147 lb)   09/30/22 66.7 kg (147 lb)   09/19/22 66.9 kg (147 lb 7.8 oz)   09/02/22 66.3 kg (146 lb 3.2 oz)   07/07/22 67.1 kg (148 lb)   06/28/22 67.1 kg (148 lb)   05/24/22 63.5 kg (140 lb)   05/19/22 67.1 kg (148 lb)       Objective:     VITALS:   Last 24hrs VS reviewed since prior progress note.  Most recent are:  Patient Vitals for the past 24 hrs:   Temp Pulse Resp BP SpO2   12/11/22 1200 98.3 °F (36.8 °C) 99 25 94/66 94 %   12/11/22 1000 -- 96 22 (!) 86/69 95 %   12/11/22 0900 -- 98 26 (!) 87/61 96 %   12/11/22 0808 -- -- -- -- 97 %   12/11/22 0800 98.4 °F (36.9 °C) 98 20 90/62 98 %   12/11/22 0738 -- -- -- -- 98 %   12/11/22 0600 -- (!) 103 24 97/71 97 %   12/11/22 0500 -- (!) 105 18 102/67 98 %   12/11/22 0422 -- (!) 111 27 97/69 97 %   12/11/22 0415 98 °F (36.7 °C) (!) 110 26 106/79 97 %   12/11/22 0347 -- -- -- -- 96 %   12/11/22 0236 -- (!) 116 23 -- 94 %   12/11/22 0231 97.8 °F (36.6 °C) (!) 115 21 100/74 95 %   12/11/22 0226 -- (!) 116 21 -- 94 %   12/11/22 0221 -- (!) 117 25 -- 95 %   12/11/22 0216 -- (!) 118 21 -- 95 %   12/11/22 0211 -- (!) 118 24 91/72 95 %   12/11/22 0201 -- (!) 120 24 91/70 --   12/11/22 0146 -- (!) 122 22 96/69 96 %   12/11/22 0145 -- (!) 122 23 92/73 96 %   12/11/22 0115 -- -- -- -- 95 %   12/11/22 0108 -- (!) 119 20 100/73 92 %   12/11/22 0008 -- (!) 145 25 109/76 91 %   12/10/22 2353 -- (!) 137 27 110/80 91 %   12/10/22 2338 -- (!) 141 24 112/77 92 %   12/10/22 2323 -- (!) 135 (!) 31 108/74 91 %   12/10/22 2308 -- (!) 127 16 119/75 96 %   12/10/22 2253 -- (!) 127 (!) 32 (!) 149/90 96 %   12/10/22 2238 -- (!) 116 25 125/83 96 %   12/10/22 2223 -- (!) 107 23 124/82 95 %   12/10/22 2208 98.3 °F (36.8 °C) (!) 110 22 124/85 94 % Intake/Output Summary (Last 24 hours) at 12/11/2022 1440  Last data filed at 12/11/2022 1217  Gross per 24 hour   Intake 650 ml   Output 250 ml   Net 400 ml        I had a face to face encounter, and independently examined this patient as outlined below:    PHYSICAL EXAM:  General:    No distress     HEENT: Atraumatic, anicteric sclerae, pink conjunctivae, MMM  Neck:  Supple, symmetrical  Lungs:   CTA. No Wheezing/Rhonchi. No rales. No tenderness. No Accessory muscle use. CVS:   Regular rhythm. No murmur. No JVD   GI/:   Soft. NT. ND. BS normal  Extremities: No edema. No cyanosis. No clubbing. Skin:     Not pale. Not Jaundiced. No rashes   Psych:  Good insight. Not depressed. Not anxious or agitated. Neurologic: Alert and oriented X 4. EOMs intact. No facial asymmetry. No slurred speech. Symmetrical strength, Sensation grossly intact. Labs     I reviewed today's most current labs and imaging studies. Pertinent labs include:  Recent Labs     12/11/22 0422 12/10/22  2237   WBC 16.0* 12.0*   HGB 12.2 12.6   HCT 36.9 38.5    280     Recent Labs     12/11/22 0422 12/10/22  2237   * 136   K 4.1 4.5    102   CO2 25 28   * 122*   BUN 20 22*   CREA 1.13 0.96   CA 9.4 9.3   MG 1.8  --    PHOS 2.7  --    ALB 2.3* 2.4*   TBILI 0.5 0.3   ALT 20 20   INR 1.1  --      CTA CHEST W OR W WO CONT    Result Date: 12/11/2022  1. Left upper and lower lobe segmental and lobar pulmonary embolism. 2.  No right-sided pulmonary embolism. 3.  Increased size of right perihilar lung mass. 4.  Small right pleural effusion with pleural drainage catheter in place. 5.  Heterogeneous attenuation of the right lower lobe suggestive of pneumonia. XR CHEST PORT    Result Date: 12/10/2022  Increased small to moderate right pleural effusion and right basilar opacity that may represent atelectasis, edema, or infection. Right chest tube in place.      DUPLEX LOWER EXT VENOUS BILAT    Result Date: 12/11/2022  : Left femoral DVT. No right DVT. CTA CHEST W OR W WO CONT    Result Date: 12/11/2022  1. Left upper and lower lobe segmental and lobar pulmonary embolism. 2.  No right-sided pulmonary embolism. 3.  Increased size of right perihilar lung mass. 4.  Small right pleural effusion with pleural drainage catheter in place. 5.  Heterogeneous attenuation of the right lower lobe suggestive of pneumonia. XR CHEST PORT    Result Date: 12/10/2022  Increased small to moderate right pleural effusion and right basilar opacity that may represent atelectasis, edema, or infection. Right chest tube in place. DUPLEX LOWER EXT VENOUS BILAT    Result Date: 12/11/2022  : Left femoral DVT. No right DVT. All Micro Results       Procedure Component Value Units Date/Time    CULTURE, BODY FLUID Jesus Cap [[id number]] Collected: 12/11/22 0159    Order Status: Completed Specimen: Body Fluid from Pleural Fluid Updated: 12/11/22 1354     Special Requests: NO SPECIAL REQUESTS        GRAM STAIN RARE WBCS SEEN        Culture result: PENDING    CULTURE, BLOOD [[id number]] Collected: 12/10/22 2211    Order Status: Completed Specimen: Blood Updated: 12/11/22 0912     Special Requests: NO SPECIAL REQUESTS        Culture result: NO GROWTH AFTER 10 HOURS       CULTURE, BLOOD [[id number]] Collected: 12/10/22 2237    Order Status: Completed Specimen: Blood Updated: 12/11/22 0912     Special Requests: NO SPECIAL REQUESTS        Culture result: NO GROWTH AFTER 10 HOURS       COVID-19 RAPID TEST [[id number]] Collected: 12/11/22 0203    Order Status: Completed Specimen: Nasopharyngeal Updated: 12/11/22 0225     Specimen source Nasopharyngeal        COVID-19 rapid test Not detected        Comment: Rapid Abbott ID Now       Rapid NAAT:  The specimen is NEGATIVE for SARS-CoV-2, the novel coronavirus associated with COVID-19.        Negative results should be treated as presumptive and, if inconsistent with clinical signs and symptoms or necessary for patient management, should be tested with an alternative molecular assay. Negative results do not preclude SARS-CoV-2 infection and should not be used as the sole basis for patient management decisions. This test has been authorized by the FDA under an Emergency Use Authorization (EUA) for use by authorized laboratories.    Fact sheet for Healthcare Providers:  http://www.blayne.nabor/  Fact sheet for Patients: http://www.blayne.nabor/       Methodology: Isothermal Nucleic Acid Amplification                   Current Medications:     Current Facility-Administered Medications:     sodium chloride (NS) flush 5-40 mL, 5-40 mL, IntraVENous, Q8H, Rere Romero NP, 10 mL at 12/11/22 1427    sodium chloride (NS) flush 5-40 mL, 5-40 mL, IntraVENous, PRN, Rere Mercer NP    acetaminophen (TYLENOL) tablet 650 mg, 650 mg, Oral, Q6H PRN **OR** acetaminophen (TYLENOL) suppository 650 mg, 650 mg, Rectal, Q6H PRN, Rere Mercer NP    polyethylene glycol (MIRALAX) packet 17 g, 17 g, Oral, DAILY PRN, Rere Mercer NP    azithromycin (ZITHROMAX) 500 mg in 0.9% sodium chloride 250 mL ([id number]), 500 mg, IntraVENous, Q24H, Gennaro Ellison MD    albuterol-ipratropium (DUO-NEB) 2.5 MG-0.5 MG/3 ML, 3 mL, Nebulization, Q4H PRN, Rere Mercer NP    pantoprazole (PROTONIX) 40 mg in 0.9% sodium chloride 10 mL injection, 40 mg, IntraVENous, DAILY, Rere Mercer NP, 40 mg at 12/11/22 0814    [START ON 12/12/2022] vancomycin (VANCOCIN) 1,000 mg in 0.9% sodium chloride 250 mL ([id number]), 1,000 mg, IntraVENous, Q24H, Gennaro Ellison MD    cefepime (MAXIPIME) 2 g in 0.9% sodium chloride (MBP/ADV) 100 mL MBP, 2 g, IntraVENous, Q12H, Gennaro Elliosn MD, Last Rate: 25 mL/hr at 12/11/22 1427, 2 g at 12/11/22 1427    alcohol 62% (NOZIN) nasal  1 Ampule, 1 Ampule, Topical, Q12H, Marianne Ellison MD, 1 Ampule at 12/11/22 0815    apixaban (ELIQUIS) tablet 10 mg, 10 mg, Oral, BID, Harshil Rhodes MD, 10 mg at 12/11/22 1140    [START ON 12/18/2022] apixaban (ELIQUIS) tablet 5 mg, 5 mg, Oral, BID, Harshil Rhodes MD     Procedures: see electronic medical records for all procedures/Xrays and details which were not copied into this note but were reviewed prior to creation of Plan. Reviewed most current lab test results and cultures  YES  Reviewed most current radiology test results   YES  Review and summation of old records today    NO  Reviewed patient's current orders and MAR    YES  PMH/ reviewed - no change compared to H&P  ________________________________________________________________________  Care Plan discussed with:    Comments   Patient x    Family  x    RN     Care Manager     Consultant                        Multidiciplinary team rounds were held today with , nursing, pharmacist and clinical coordinator. Patient's plan of care was discussed; medications were reviewed and discharge planning was addressed.      ________________________________________________________________________  Total NON critical care TIME:  45   Minutes    Total CRITICAL CARE TIME Spent:   Minutes non procedure based      Comments   >50% of visit spent in counseling and coordination of care x     This includes time during multidisciplinary rounds if indicated above   ________________________________________________________________________  Madison Lantigua MD

## 2022-12-11 NOTE — PROGRESS NOTES
Patient was seen independently of the NP   He is resting comfortably on nasal cannula at 2L /min  Denies any chest pain or shortness of breath  D/w patient about the recent ct chest finding of worsening of right lung mass and mets to brain and that I do not recommend ventilator assistance as he might not be able to come off it  Patient agrees and code status changed to DNR  Called patient's wife and informed her about patient's decision

## 2022-12-11 NOTE — ED NOTES
This RN calling nursing supervisor to try and obtain a negative vacuum pump for pt;' lung drain.  MD Notified

## 2022-12-11 NOTE — PROGRESS NOTES
Pharmacy Antimicrobial Kinetic Dosing    Indication for Antimicrobials: CAP     Current Regimen of Each Antimicrobial:  Vancomycin - Pharmacy to Dose  (Start Date ; Day # 1)  Cefepime 2g IV Q8H (Start ; Day 1  Azithromycin 500 mg IV Q24H (Start 12/10 - Day 2)    Previous Antimicrobial Therapy:      Goal Level: AUC: 400-600 mg/hr/Liter/day    Date Dose & Interval Measured (mcg/mL) Predicted AUC/EARL                       Date & time of next level:     Dosing calculator used: [facility] calculator    Significant Positive Cultures:   12/10 - Blood - NG   Body Fluid - Pending    Conditions for Dosing Consideration: None    Labs:  Recent Labs     22  0422 12/10/22  2237   CREA 1.13 0.96   BUN 20 22*   PCT  --  0.08     Recent Labs     222 12/10/22  2237   WBC 16.0* 12.0*     Temp (24hrs), Av °F (36.7 °C), Min:97.8 °F (36.6 °C), Max:98.3 °F (36.8 °C)        Creatinine Clearance (mL/min):   CrCl (Adjusted Body Weight): 48.9 mL/min   If actual weight < IBW: CrCl (Actual Body Weight) 46.5    Impression/Plan:   Vancomycin dosed for estimated   Change cefepime to Q12H interval based on renal function  Continue Azithromycin   Antimicrobial stop date TBD     Pharmacy will follow daily and adjust medications as appropriate for renal function and/or serum levels.     Thank you,  Arnoldo Stokes, CAROLD

## 2022-12-11 NOTE — REMOTE MONITORING
Attempted to contact primary RN regarding this patient. Left message with Bedside RN regarding 3 hour Sepsis bundle.     Pedro Valdez 20  Callback # [phone]

## 2022-12-11 NOTE — ED PROVIDER NOTES
EMERGENCY DEPARTMENT HISTORY AND PHYSICAL EXAM      Date: 12/10/2022  Patient Name: Ana Serrano    History of Presenting Illness     Chief Complaint   Patient presents with    Respiratory Distress     Pt arrives from home for sudden onset SOB tonight. Hx of lung cancer and has a drain in place in place for R Lung that is drained MWF. Pt has SpO2 in 80's and was placed on non-rebreather. RA SpO2 upon arrival 88%. History Provided By: Patient and EMS    HPI: Ana Serrano, 80 y.o. male presents to the ED with cc of shortness of breath. 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 Patient is followed at [address] [place] [address] currently on oral chemotherapy. Patient states pleural drain in place which he drains every Monday Wednesday and Friday. He states that today he had had a sudden onset of worsening shortness of breath. Per EMS patient's oxygen saturations in the low 80s upon their arrival patient was placed on nonrebreather and brought to the ED for further evaluation. Patient's shortness of breath moderate in severity worse with exertion. There is been no orthopnea. There is been no fever however the patient states he has had some chills. With decreased appetite. Patient states productive cough at times. He denies any chest pain. Denies abdominal pain, nausea, vomiting or diarrhea. There is been no recent pain or swelling lower extremities. Patient does not currently take any anticoagulation. There are no other complaints, changes, or physical findings at this time. PCP: Guerline Chou MD    No current facility-administered medications on file prior to encounter.      Current Outpatient Medications on File Prior to Encounter   Medication Sig Dispense Refill    HYDROcodone-acetaminophen (NORCO) 5-325 mg per tablet       pembrolizumab (KEYTRUDA IV) Every 3 weeks      carbamide peroxide (DEBROX) 6.5 % otic solution Administer 5 Drops into each ear two (2) times a day. 30 mL 1    cholecalciferol, vitamin D3, 50 mcg (2,000 unit) tab Take 1 Tab by mouth daily. esomeprazole (NEXIUM) 40 mg capsule TAKE 1 CAPSULE DAILY 90 Cap 1    sildenafil citrate (VIAGRA) 50 mg tablet Take 50 mg by mouth as needed. CYANOCOBALAMIN (VITAMIN B-12 PO) Take 1,000 mg by mouth daily. pyridoxine, vitamin B6, (VITAMIN B-6) 100 mg tablet Take 100 mg by mouth daily. MULTIVITAMINS (MULTIVITAMIN PO) Take 1 Tablet by mouth daily.          Past History     Past Medical History:  Past Medical History:   Diagnosis Date    Anemia     r/t chemo    Arthritis     BPH without obstruction/lower urinary tract symptoms 01/16/2018    Bronchogenic carcinoma of right lung ([address] 05/2022    bronchogenic carcinoma centered posteriorly in the right upper lobe obstructing the posterior segmental bronchus of the right upper lobe; Stage IIIB; systemic therapy consisting of carboplatinum, Abraxane and Keytruda    Diet-controlled diabetes mellitus ([address]     Diverticulosis     Drug-induced polyneuropathy ([address] 01/16/2018    Due to chemotherapy    GERD with stricture 01/16/2018    Status post dilatation    History of esophageal cancer 2008    tx esophagectomy, chemo, radiation (complete 2014)    History of gout     Hx of colonic polyps     Hyperkalemia 04/29/2019    Kidney cyst     Malignant pleural effusion     With indwelling plerex catheter    CHAVEZ (nonalcoholic steatohepatitis) 2000    \"fatty liver\" diet related    Personal history of colonic polyps 12/06/2012    Primary hypertension     Stage 3 chronic kidney disease ([address] 04/29/2019    Thrombocytopenia ([address]     r/t chemo    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 01/16/2018    Vitamin D deficiency 01/16/2018       Past Surgical History:  Past Surgical History:   Procedure Laterality Date    COLONOSCOPY N/A 6/23/2016    COLONOSCOPY performed by Jonelle Guzman MD at [facility] ENDOSCOPY    COLONOSCOPY N/A 7/2/2021 COLONOSCOPY performed by Pelon Qureshi MD at [address]  2021         HX CATARACT REMOVAL Bilateral     HX CHOLECYSTECTOMY  11    HX ESOPHAGECTOMY  2008    HX HEMORRHOIDECTOMY      HX HERNIA REPAIR      HX ROTATOR CUFF REPAIR Right 2014    HX VASCULAR ACCESS Right     port placed and removed in     IR INSERT CATH PLEURAL INDWELL  2022    IR INSERT TUNL CVC W PORT OVER 5 YEARS  2022    IR REMOVE TUNL CVAD W PORT/PUMP  2022    [facility] COLSC FLX W/RMVL OF TUMOR POLYP LESION SNARE TQ  2010         NH EGD BALLOON DILATION ESOPHAGUS <30 MM DIAM  3/3/2011         NH EGD BALLOON DILATION ESOPHAGUS <30 MM DIAM  2012         [facility] EGD TRANSORAL BIOPSY SINGLE/MULTIPLE  3/29/2012         UPPER GI ENDOSCOPY,BALL DIL,30MM  2018         UPPER GI ENDOSCOPY,BALL DIL,30MM  2020            Family History:  Family History   Problem Relation Age of Onset    Diabetes Mother     OSTEOARTHRITIS Father     Cancer Sister         lung    Cancer Sister         breast       Social History:  Social History     Tobacco Use    Smoking status: Former     Packs/day: 2.00     Years: 25.00     Pack years: 50.00     Types: Cigarettes     Quit date: 1987     Years since quittin.9    Smokeless tobacco: Never   Vaping Use    Vaping Use: Never used   Substance Use Topics    Alcohol use: No    Drug use: No       Allergies:  No Known Allergies      Review of Systems   Review of Systems   Constitutional:  Positive for appetite change and chills. Negative for fatigue and fever. HENT:  Negative for congestion, rhinorrhea, sinus pressure and sore throat. Eyes: Negative. Respiratory:  Positive for cough, shortness of breath and wheezing. Negative for choking and chest tightness. Cardiovascular: Negative. Negative for chest pain, palpitations and leg swelling. Gastrointestinal:  Negative for abdominal pain, constipation, diarrhea, nausea and vomiting. Endocrine: Negative. Genitourinary: Negative. Negative for difficulty urinating, dysuria, flank pain and urgency. Musculoskeletal: Negative. Skin: Negative. Neurological: Negative. Negative for dizziness, speech difficulty, weakness, light-headedness, numbness and headaches. Psychiatric/Behavioral: Negative. All other systems reviewed and are negative. Physical Exam   Physical Exam  Vitals and nursing note reviewed. Constitutional:       General: He is in acute distress. Appearance: He is well-developed. He is ill-appearing. He is not diaphoretic. HENT:      Head: Normocephalic and atraumatic. Mouth/Throat:      Mouth: Mucous membranes are moist.      Pharynx: No oropharyngeal exudate. Eyes:      Extraocular Movements: Extraocular movements intact. Conjunctiva/sclera: Conjunctivae normal.      Pupils: Pupils are equal, round, and reactive to light. Neck:      Vascular: No JVD. Trachea: No tracheal deviation. Cardiovascular:      Rate and Rhythm: Regular rhythm. Tachycardia present. Heart sounds: Normal heart sounds. No murmur heard. Pulmonary:      Effort: Respiratory distress present. Breath sounds: No stridor. Wheezing and rhonchi present. No rales. Comments: Wheezing throughout all lung fields, diminished in the right base, rhonchi primarily on the right-hand side.,  Increased work of breathing with tachypnea and accessory muscle use  Abdominal:      General: There is no distension. Palpations: Abdomen is soft. Tenderness: There is no abdominal tenderness. There is no guarding or rebound. Musculoskeletal:         General: Normal range of motion. Cervical back: Normal range of motion and neck supple. Right lower leg: No edema. Left lower leg: No edema. Skin:     General: Skin is warm and dry. Capillary Refill: Capillary refill takes 2 to 3 seconds.    Neurological:      Mental Status: He is alert and oriented to person, place, and time. Cranial Nerves: No cranial nerve deficit. Comments: No gross motor or sensory deficits    Psychiatric:         Mood and Affect: Mood normal.         Behavior: Behavior normal.       Diagnostic Study Results     Labs -     Recent Results (from the past 12 hour(s))   EKG, 12 LEAD, INITIAL    Collection Time: 12/10/22 10:06 PM   Result Value Ref Range    Ventricular Rate 111 BPM    Atrial Rate 111 BPM    P-R Interval 194 ms    QRS Duration 84 ms    Q-T Interval 376 ms    QTC Calculation (Bezet) 511 ms    Calculated P Axis 68 degrees    Calculated R Axis 62 degrees    Calculated T Axis 20 degrees    Diagnosis       Sinus tachycardia  Low voltage QRS  ST & T wave abnormality, consider inferior ischemia  When compared with ECG of 19-SEP-2022 13:41,  T wave inversion now evident in Inferior leads  T wave inversion now evident in Lateral leads     CBC WITH AUTOMATED DIFF    Collection Time: 12/10/22 10:37 PM   Result Value Ref Range    WBC 12.0 (H) 4.1 - 11.1 K/uL    RBC 4.56 4.10 - 5.70 M/uL    HGB 12.6 12.1 - 17.0 g/dL    HCT 38.5 36.6 - 50.3 %    MCV 84.4 80.0 - 99.0 FL    MCH 27.6 26.0 - 34.0 PG    MCHC 32.7 30.0 - 36.5 g/dL    RDW 14.7 (H) 11.5 - 14.5 %    PLATELET 701 740 - 038 K/uL    MPV 10.5 8.9 - 12.9 FL    NRBC 0.0 0  WBC    ABSOLUTE NRBC 0.00 0.00 - 0.01 K/uL    NEUTROPHILS 77 (H) 32 - 75 %    LYMPHOCYTES 13 12 - 49 %    MONOCYTES 9 5 - 13 %    EOSINOPHILS 1 0 - 7 %    BASOPHILS 0 0 - 1 %    IMMATURE GRANULOCYTES 0 0.0 - 0.5 %    ABS. NEUTROPHILS 9.1 (H) 1.8 - 8.0 K/UL    ABS. LYMPHOCYTES 1.6 0.8 - 3.5 K/UL    ABS. MONOCYTES 1.0 0.0 - 1.0 K/UL    ABS. EOSINOPHILS 0.1 0.0 - 0.4 K/UL    ABS. BASOPHILS 0.0 0.0 - 0.1 K/UL    ABS. IMM.  GRANS. 0.0 0.00 - 0.04 K/UL    DF AUTOMATED     METABOLIC PANEL, COMPREHENSIVE    Collection Time: 12/10/22 10:37 PM   Result Value Ref Range    Sodium 136 136 - 145 mmol/L    Potassium 4.5 3.5 - 5.1 mmol/L    Chloride 102 97 - 108 mmol/L CO2 28 21 - 32 mmol/L    Anion gap 6 5 - 15 mmol/L    Glucose 122 (H) 65 - 100 mg/dL    BUN 22 (H) 6 - 20 MG/DL    Creatinine 0.96 0.70 - 1.30 MG/DL    BUN/Creatinine ratio 23 (H) 12 - 20      eGFR >60 >60 ml/min/1.73m2    Calcium 9.3 8.5 - 10.1 MG/DL    Bilirubin, total 0.3 0.2 - 1.0 MG/DL    ALT (SGPT) 20 12 - 78 U/L    AST (SGOT) 31 15 - 37 U/L    Alk. phosphatase 115 45 - 117 U/L    Protein, total 7.1 6.4 - 8.2 g/dL    Albumin 2.4 (L) 3.5 - 5.0 g/dL    Globulin 4.7 (H) 2.0 - 4.0 g/dL    A-G Ratio 0.5 (L) 1.1 - 2.2     NT-PRO BNP    Collection Time: 12/10/22 10:37 PM   Result Value Ref Range    NT pro- (H) <450 PG/ML   SAMPLES BEING HELD    Collection Time: 12/10/22 10:37 PM   Result Value Ref Range    SAMPLES BEING HELD BLUE, SST     COMMENT        Add-on orders for these samples will be processed based on acceptable specimen integrity and analyte stability, which may vary by analyte. POC LACTIC ACID    Collection Time: 12/10/22 10:47 PM   Result Value Ref Range    Lactic Acid (POC) 1.79 0.40 - 2.00 mmol/L       Radiologic Studies -   XR CHEST PORT   Final Result      Increased small to moderate right pleural effusion and right basilar opacity   that may represent atelectasis, edema, or infection. Right chest tube in place. CTA CHEST W OR W WO CONT    (Results Pending)     CT Results  (Last 48 hours)                 12/11/22 0055  CTA CHEST W OR W WO CONT Preliminary result      This result has not been signed. Information might be incomplete. Narrative:  *PRELIMINARY REPORT*       Acute pulmonary embolism in the left lower lobar and left upper lobe segmental   pulmonary artery levels. Increased size of right lung masses. Right lower lobe   pneumonia. The preliminary findings were discussed with the emergency department on   12/11/2022 at 0055 hours by Dr. Eduard Hector. [id number]        Final report to follow.        *END PRELIMINARY REPORT*              CXR Results  (Last 48 hours) 12/10/22 2228  XR CHEST PORT Final result    Impression:      Increased small to moderate right pleural effusion and right basilar opacity   that may represent atelectasis, edema, or infection. Right chest tube in place. Narrative:  EXAM:  XR CHEST PORT       INDICATION: Shortness of breath       COMPARISON: 9/19/2022       TECHNIQUE: PA and lateral chest views       FINDINGS: The right chest tube is stable. The cardiomediastinal contours are   stable. The pulmonary vasculature is within normal limits. There is an increased small to moderate right pleural effusion and right basilar   opacity. Lung nodules are better seen on prior CT. The left pleural space is   clear. There is no pneumothorax. The bones and upper abdomen are stable. Medical Decision Making   I am the first provider for this patient. I reviewed the vital signs, available nursing notes, past medical history, past surgical history, family history and social history. Vital Signs-Reviewed the patient's vital signs. Patient Vitals for the past 12 hrs:   Temp Pulse Resp BP SpO2   12/11/22 0115 -- -- -- -- 95 %   12/11/22 0108 -- (!) 119 20 100/73 92 %   12/11/22 0008 -- (!) 145 25 109/76 91 %   12/10/22 2353 -- (!) 137 27 110/80 91 %   12/10/22 2338 -- (!) 141 24 112/77 92 %   12/10/22 2323 -- (!) 135 (!) 31 108/74 91 %   12/10/22 2308 -- (!) 127 16 119/75 96 %   12/10/22 2253 -- (!) 127 (!) 32 (!) 149/90 96 %   12/10/22 2238 -- (!) 116 25 125/83 96 %   12/10/22 2223 -- (!) 107 23 124/82 95 %   12/10/22 2208 98.3 °F (36.8 °C) (!) 110 22 124/85 94 %       EKG interpretation: (Preliminary)  2206  Sinus tachycardia, rate 111, normal axis/GA/QRS,, nonspecific ST changes poor baseline.   EKG reviewed by me    Records Reviewed: Nursing Notes, Old Medical Records, Previous electrocardiograms, Ambulance Run Sheet, Previous Radiology Studies, and Previous Laboratory Studies patient follow[facility]up with [facility] Sarah Sandoval.  Patient has a history of malignant neoplasm of the esophagus as well as his right lower lobe. Provider Notes (Medical Decision Making):   Ddx-acute respiratory failure with hypoxia, pneumonia, bronchitis, pleural effusion, PE, electrolyte abnormality    ED Course:   Initial assessment performed. The patients presenting problems have been discussed, and they are in agreement with the care plan formulated and outlined with them. I have encouraged them to ask questions as they arise throughout their visit. Patient arrived by EMS with O2 sats in the 80s. Patient was placed on 4 L by nasal cannula with improvement oxygen saturations to 94%. Patient with wheezing throughout all lung fields diminished in the right base. Patient was given hour-long nebulizer treatment. X-ray shows pleural effusion in addition to what appears to be a right lower lobe infiltrate. We will start IV antibiotics Rocephin and azithromycin for the pneumonia. Patient has a pleural cath we will see if we can drain some fluid off to improve his shortness of breath. If not we will need to start the patient on BiPAP secondary to his tachypnea and work of breathing. Given his tachycardia will obtain CT PE study to rule out pulmonary emboli    Patient returned from CT still with respiratory rates in the 30s we will place the patient on BiPAP. Consult:  Case discussed with radiologist patient does have worsening increase in size of his lung mass in addition to right lower lobe pneumonia as well as pulmonary emboli on the left. We will dose with Lovenox. I have discussed the findings with the patient and his wife. Patient will need to be admitted to the ICU given his BiPAP. Patient and his wife wish to remain full code at this time    Consult note:  Case discussed with the intensivist.  Patient will be evaluated admitted.     Critical Care Time:   CRITICAL CARE NOTE :    11:38 PM    IMPENDING DETERIORATION -Respiratory and Cardiovascular  ASSOCIATED RISK FACTORS - Hypoxia and Dysrhythmia  MANAGEMENT- Bedside Assessment and Supervision of Care  INTERPRETATION -  Xrays, CT Scan, ECG, Blood Pressure, Cardiac Output Measures , and labs  INTERVENTIONS - hemodynamic mngmt and BiPAP, IV Ab, Albuterol  CASE REVIEW - Hospitalist/Intensivist, Nursing, and Family  TREATMENT RESPONSE -Stable  PERFORMED BY - Self    NOTES   :  I have spent 90 minutes of critical care time involved in lab review, consultations with specialist, family decision- making, bedside attention and documentation. This time excludes time spent in any separate billed procedures. During this entire length of time I was immediately available to the patient . Maninder Hall DO      Disposition:  Admit      Diagnosis     Clinical Impression:   1. Acute respiratory failure with hypoxia ([address]    2. Multiple subsegmental pulmonary emboli without acute cor pulmonale (HCC)    3. Pneumonia of right lower lobe due to infectious organism    4. Right lower lobe lung mass        Attestations:    Maninder Hall DO        Please note that this dictation was completed with [employer], the computer voice recognition software. Quite often unanticipated grammatical, syntax, homophones, and other interpretive errors are inadvertently transcribed by the computer software. Please disregard these errors. Please excuse any errors that have escaped final proofreading. Thank you.

## 2022-12-11 NOTE — PROGRESS NOTES
12/11/22 0141   Oxygen Therapy   O2 Device BIPAP   Skin Assessment Redness (see comment/note)   Skin Protection for O2 Device Yes   Orientation Bilateral   Location Cheek; Other (Comment)  (Bridge of nose)   Interventions Skin Barrier   Skin barrier added due to slight skin redness under mask.

## 2022-12-11 NOTE — H&P
[facility] INITIAL ASSESSMENT. Name: Nando Bowens   : 1940   MRN: [id number]   Date: 2022        Chief Complaint   Patient presents with    Respiratory Distress     Pt arrives from home for sudden onset SOB tonight. Hx of lung cancer and has a drain in place in place for R Lung that is drained MWF. Pt has SpO2 in 80's and was placed on non-rebreather. RA SpO2 upon arrival 88%. 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 Upon arrival to [address] [place] [address] pt SPO2 88% on 6 L NC with a respiratory rate in the 40s. Placed on Bipap with some improvement in WOB. CTA chest done and showed Acute pulmonary embolism in the left lower lobar and left upper lobe segmental pulmonary artery levels, Increased size of right lung masses and Right lower lobe pneumonia. Given therapeutic Lovenox for PE and ceftriaxone and azithromycin for CAP. ICU consulted for admission. Upon my arrival pt breathing more comfortably on Bipap. -120s ST.  BP stable. Will be admitted to the ICU for further management.     Active Problems Being Managed:     Acute hypoxic respiratory failure  Pulmonary emboli  RLL PNA  Lung adenocarcinoma     Assessment/Plan:     Acute hypoxic respiratory failure due to Pulmonary emboli + RLL PNA  -CTA chest done and showed acute pulmonary embolism in the left lower lobar and left upper lobe segmental pulmonary artery levels, increased size of right lung masses and right lower lobe pneumonia  -cont Bipap  -ABG  -goal SPO2 92-97%  -s/p 70mg Lovenox in ED, continue with therapeutic Lovenox (1mg/kg BID) for PE  -lower extremity ultrasound to r/o DVT  -TTE to r/o rt heart strain  -trop 179,   -covid and flu negative  -cefepime, vanc, and azithromycin for broad spectrum coverage of RLL PNA  -duonebs Q 4 hour prn    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  -pmh of esophageal cancer s/p chemo and radiation 14 years ago  -currently receives cancer treatment at [facility], per notes he is on palliative chemotherapy consisting of carboplatinum, Abraxane, and Keytruda  -pt has rt sided pleurx catheter that is drained M,W,F  -s/p 200cc drained in ED  -follow up culture of pleural fluid to r/o superimposed infection  -MRI brain done on 12/9 that showed brain metastasis   -palliative care consult to help family with overwhelming symptoms and care decisions    DVT prophylaxis: therapeutic Lovenox  SUP: protonix  Code status: Full code    Next of kin: Richar Tolley (spouse). Spoke with pt and spouse about code status, they wish for pt to be full code     I personally spent 60 minutes of critical care time. This is time spent at this critically ill patient's bedside actively involved in patient care as well as the coordination of care and discussions with the patient's family. This does not include any procedural time which has been billed separately. Review of systems:     Review of Systems   Constitutional:  Negative for chills and fever. HENT:  Negative for hearing loss. Eyes:  Negative for blurred vision. Respiratory:  Positive for cough, shortness of breath and wheezing. Cardiovascular:  Negative for chest pain and palpitations. Gastrointestinal:  Negative for abdominal pain, heartburn, nausea and vomiting. Genitourinary:  Negative for dysuria and urgency. Musculoskeletal:  Negative for myalgias. Neurological:  Negative for dizziness and headaches.         Objective:     Vital Signs:  Visit Vitals  /74   Pulse (!) 115   Temp 98.3 °F (36.8 °C)   Resp 21   Ht 5' 9\" (1.753 m)   Wt 66.9 kg (147 lb 7.8 oz)   SpO2 95%   BMI 21.78 kg/m²    O2 Flow Rate (L/min): 4 l/min O2 Device: BIPAP Temp (24hrs), Av.3 °F (36.8 °C), Min:98.3 °F (36.8 °C), Max:98.3 °F (36.8 °C)           Intake/Output:   No intake or output data in the 24 hours ending 22 0236    Physical Exam:  Physical Exam  Constitutional:       Appearance: He is ill-appearing. HENT:      Head: Normocephalic. Nose: Nose normal.      Mouth/Throat:      Mouth: Mucous membranes are moist.   Eyes:      Conjunctiva/sclera: Conjunctivae normal.   Cardiovascular:      Rate and Rhythm: Regular rhythm. Tachycardia present. Pulses: Normal pulses. Heart sounds: Normal heart sounds. Pulmonary:      Breath sounds: Wheezing and rhonchi present. Abdominal:      Palpations: Abdomen is soft. Tenderness: There is no abdominal tenderness. Musculoskeletal:         General: Normal range of motion. Cervical back: Normal range of motion. Skin:     General: Skin is warm. Capillary Refill: Capillary refill takes less than 2 seconds. Neurological:      General: No focal deficit present. Past Medical History:        has a past medical history of Anemia, Arthritis, BPH without obstruction/lower urinary tract symptoms (2018), Bronchogenic carcinoma of right lung ([address] (2022), Diet-controlled diabetes mellitus ([address] Diverticulosis, Drug-induced polyneuropathy ([address] (2018), GERD with stricture (2018), History of esophageal cancer (), History of gout, colonic polyps, Hyperkalemia (2019), Kidney cyst, Malignant pleural effusion, CHAVEZ (nonalcoholic steatohepatitis) (), Personal history of colonic polyps (2012), Primary hypertension, Stage 3 chronic kidney disease ([address] (2019), Thrombocytopenia ([address] 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     Past Surgical History:      has a past surgical history that includes pr colsc flx w/rmvl of tumor polyp lesion snare tq (2010); pr egd balloon dilation esophagus <30 mm diam (3/3/2011); hx cholecystectomy (11); pr egd transoral biopsy single/multiple (3/29/2012); pr egd balloon dilation esophagus <30 mm diam (2012); colonoscopy (N/A, 2016); upper gi endoscopy,ball dil,30mm (2018); hx hemorrhoidectomy; hx cataract removal (Bilateral); hx rotator cuff repair (Right, ); hx hernia repair; hx esophagectomy (); upper gi endoscopy,ball dil,30mm (2020); hx vascular access (Right, ); colonoscopy,remv lesn,snare (2021); colonoscopy (N/A, 2021); ir insert tunl cvc w port over 5 years (2022); ir insert cath pleural indwell (2022); and ir remove tunl cvad w port/pump (2022). Home Medications:     Prior to Admission medications    Medication Sig Start Date End Date Taking? Authorizing Provider   HYDROcodone-acetaminophen [facility]) 5-325 mg per tablet  22   Provider, Historical   pembrolizumab (KEYTRUDA IV) Every 3 weeks 22   Provider, Historical   carbamide peroxide (DEBROX) 6.5 % otic solution Administer 5 Drops into each ear two (2) times a day. 22   Arnol Orellana MD   cholecalciferol, vitamin D3, 50 mcg (2,000 unit) tab Take 1 Tab by mouth daily. Provider, Historical   esomeprazole (NEXIUM) 40 mg capsule TAKE 1 CAPSULE DAILY 19   Stacie Wagner MD   sildenafil citrate (VIAGRA) 50 mg tablet Take 50 mg by mouth as needed. Provider, Historical   CYANOCOBALAMIN (VITAMIN B-12 PO) Take 1,000 mg by mouth daily. 7/1/10   Provider, Historical   pyridoxine, vitamin B6, (VITAMIN B-6) 100 mg tablet Take 100 mg by mouth daily. Provider, Historical   MULTIVITAMINS (MULTIVITAMIN PO) Take 1 Tablet by mouth daily.  7/1/10   Provider, Historical         Allergies/Social/Family History:     No Known Allergies   Social History     Tobacco Use    Smoking status: Former     Packs/day: 2.00     Years: 25.00     Pack years: 50.00     Types: Cigarettes     Quit date: 1987     Years since quittin.9    Smokeless tobacco: Never   Substance Use Topics    Alcohol use: No      Family History   Problem Relation Age of Onset    Diabetes Mother     OSTEOARTHRITIS Father     Cancer Sister         lung    Cancer Sister         breast         LABS AND  DATA:   Reviewed      Peak airway pressure:      Minute ventilation: 27.7 l/min      CRITICAL CARE CONSULTANT NOTE  I had a face to face encounter with the patient, reviewed and interpreted patient data including clinical events, labs, images, vital signs, I/O's, and examined patient. I have discussed the case and the plan and management of the patient's care with the consulting services, the bedside nurses and the respiratory therapist.      NOTE OF PERSONAL INVOLVEMENT IN CARE   This patient has a high probability of imminent, clinically significant deterioration, which requires the highest level of preparedness to intervene urgently. I participated in the decision-making and personally managed or directed the management of the following life and organ supporting interventions that required my frequent assessment to treat or prevent imminent deterioration.         Nadege Carias NP   Pulmonary/CCM  [phone]  [phone]  12/11/2022

## 2022-12-11 NOTE — PROGRESS NOTES
0700 Bedside and Verbal shift change report given to [address] [place] [address] (oncoming nurse) by Alli Peres RN (offgoing nurse). Report included the following information SBAR, Kardex, Intake/Output, MAR, Cardiac Rhythm NSR, and Quality Measures. 0800 Assessment done, alert and oriented x 4, on BiPAP, NSR on monitor, voiding freely,2 PIV intact and secured. 2563 RT at bedside transitioned to nasal cannula 5lpm, saturating well at 97 %,     0920 Dr. Rohith De León at bedside, planned for transfer to stepdown,made aware of systolic less than 90, MAP > 65, bolus of saline 500 mL ordered. 1319 Per ultrasound technician, patient has DVT on the left groin and and left upper thigh, Dr. Rohith De León made aware    1600 Reassessment done. 1813 Back care done, blanchable erythema on sacrum, venelex ordered    1900 Bedside and Verbal shift change report given to [facility] (oncoming nurse) by Karuna King RN (offgoing nurse). Report included the following information SBAR, Kardex, Intake/Output, MAR, Cardiac Rhythm NSR, and Quality Measures.

## 2022-12-11 NOTE — PROGRESS NOTES
3174 Received report over the phone from Casandra Valerio, [address] [place] [address] in ED.    0400 Patient arrived to room 2528.    9633 Assessment complete. Detail assessment in flowsheet. Wife decided not to stay in the room. She went outside and call someone to pick her up.

## 2022-12-12 LAB
ANION GAP SERPL CALC-SCNC: 6 MMOL/L (ref 5–15)
BASOPHILS # BLD: 0 K/UL (ref 0–0.1)
BASOPHILS NFR BLD: 0 % (ref 0–1)
BUN SERPL-MCNC: 21 MG/DL (ref 6–20)
BUN/CREAT SERPL: 23 (ref 12–20)
CALCIUM SERPL-MCNC: 9.1 MG/DL (ref 8.5–10.1)
CHLORIDE SERPL-SCNC: 107 MMOL/L (ref 97–108)
CO2 SERPL-SCNC: 26 MMOL/L (ref 21–32)
CREAT SERPL-MCNC: 0.93 MG/DL (ref 0.7–1.3)
DIFFERENTIAL METHOD BLD: ABNORMAL
EOSINOPHIL # BLD: 0 K/UL (ref 0–0.4)
EOSINOPHIL NFR BLD: 0 % (ref 0–7)
ERYTHROCYTE [DISTWIDTH] IN BLOOD BY AUTOMATED COUNT: 15.1 % (ref 11.5–14.5)
GLUCOSE BLD STRIP.AUTO-MCNC: 100 MG/DL (ref 65–117)
GLUCOSE BLD STRIP.AUTO-MCNC: 120 MG/DL (ref 65–117)
GLUCOSE BLD STRIP.AUTO-MCNC: 87 MG/DL (ref 65–117)
GLUCOSE BLD STRIP.AUTO-MCNC: 88 MG/DL (ref 65–117)
GLUCOSE SERPL-MCNC: 116 MG/DL (ref 65–100)
HCT VFR BLD AUTO: 30.8 % (ref 36.6–50.3)
HGB BLD-MCNC: 10 G/DL (ref 12.1–17)
IMM GRANULOCYTES # BLD AUTO: 0.1 K/UL (ref 0–0.04)
IMM GRANULOCYTES NFR BLD AUTO: 1 % (ref 0–0.5)
LYMPHOCYTES # BLD: 1.2 K/UL (ref 0.8–3.5)
LYMPHOCYTES NFR BLD: 7 % (ref 12–49)
MAGNESIUM SERPL-MCNC: 1.8 MG/DL (ref 1.6–2.4)
MCH RBC QN AUTO: 26.7 PG (ref 26–34)
MCHC RBC AUTO-ENTMCNC: 32.5 G/DL (ref 30–36.5)
MCV RBC AUTO: 82.1 FL (ref 80–99)
MONOCYTES # BLD: 1.1 K/UL (ref 0–1)
MONOCYTES NFR BLD: 6 % (ref 5–13)
NEUTS SEG # BLD: 15.9 K/UL (ref 1.8–8)
NEUTS SEG NFR BLD: 86 % (ref 32–75)
NRBC # BLD: 0 K/UL (ref 0–0.01)
NRBC BLD-RTO: 0 PER 100 WBC
PHOSPHATE SERPL-MCNC: 3.1 MG/DL (ref 2.6–4.7)
PLATELET # BLD AUTO: 276 K/UL (ref 150–400)
PMV BLD AUTO: 9.6 FL (ref 8.9–12.9)
POTASSIUM SERPL-SCNC: 4.4 MMOL/L (ref 3.5–5.1)
RBC # BLD AUTO: 3.75 M/UL (ref 4.1–5.7)
SERVICE CMNT-IMP: ABNORMAL
SERVICE CMNT-IMP: NORMAL
SODIUM SERPL-SCNC: 139 MMOL/L (ref 136–145)
WBC # BLD AUTO: 18.3 K/UL (ref 4.1–11.1)

## 2022-12-12 PROCEDURE — 85025 COMPLETE CBC W/AUTO DIFF WBC: CPT

## 2022-12-12 PROCEDURE — 74011250637 HC RX REV CODE- 250/637: Performed by: GENERAL ACUTE CARE HOSPITAL

## 2022-12-12 PROCEDURE — 36415 COLL VENOUS BLD VENIPUNCTURE: CPT

## 2022-12-12 PROCEDURE — 84100 ASSAY OF PHOSPHORUS: CPT

## 2022-12-12 PROCEDURE — 77010033678 HC OXYGEN DAILY

## 2022-12-12 PROCEDURE — 74011250637 HC RX REV CODE- 250/637: Performed by: INTERNAL MEDICINE

## 2022-12-12 PROCEDURE — 74011000250 HC RX REV CODE- 250: Performed by: NURSE PRACTITIONER

## 2022-12-12 PROCEDURE — 83735 ASSAY OF MAGNESIUM: CPT

## 2022-12-12 PROCEDURE — 65660000001 HC RM ICU INTERMED STEPDOWN

## 2022-12-12 PROCEDURE — 82962 GLUCOSE BLOOD TEST: CPT

## 2022-12-12 PROCEDURE — 74011250636 HC RX REV CODE- 250/636: Performed by: INTERNAL MEDICINE

## 2022-12-12 PROCEDURE — C9113 INJ PANTOPRAZOLE SODIUM, VIA: HCPCS | Performed by: NURSE PRACTITIONER

## 2022-12-12 PROCEDURE — 80048 BASIC METABOLIC PNL TOTAL CA: CPT

## 2022-12-12 PROCEDURE — 74011000258 HC RX REV CODE- 258: Performed by: INTERNAL MEDICINE

## 2022-12-12 PROCEDURE — 74011250636 HC RX REV CODE- 250/636: Performed by: NURSE PRACTITIONER

## 2022-12-12 RX ORDER — METHYLPHENIDATE HYDROCHLORIDE 5 MG/1
TABLET ORAL
COMMUNITY
Start: 2022-11-09 | End: 2022-12-22

## 2022-12-12 RX ORDER — HYDROCODONE BITARTRATE AND HOMATROPINE METHYLBROMIDE 5; 1.5 MG/5ML; MG/5ML
SOLUTION ORAL
COMMUNITY
Start: 2022-11-30

## 2022-12-12 RX ORDER — MIRTAZAPINE 30 MG/1
TABLET, FILM COATED ORAL
COMMUNITY
Start: 2022-12-10

## 2022-12-12 RX ADMIN — VANCOMYCIN HYDROCHLORIDE 1000 MG: 1 INJECTION, POWDER, LYOPHILIZED, FOR SOLUTION INTRAVENOUS at 06:25

## 2022-12-12 RX ADMIN — CASTOR OIL AND BALSAM, PERU: 788; 87 OINTMENT TOPICAL at 21:16

## 2022-12-12 RX ADMIN — PYRIDOXINE HCL TAB 50 MG 50 MG: 50 TAB at 09:13

## 2022-12-12 RX ADMIN — Medication 1 AMPULE: at 09:14

## 2022-12-12 RX ADMIN — SODIUM CHLORIDE, PRESERVATIVE FREE 10 ML: 5 INJECTION INTRAVENOUS at 21:14

## 2022-12-12 RX ADMIN — Medication 1000 MCG: at 09:13

## 2022-12-12 RX ADMIN — CEFEPIME 2 G: 2 INJECTION, POWDER, FOR SOLUTION INTRAVENOUS at 03:26

## 2022-12-12 RX ADMIN — APIXABAN 10 MG: 5 TABLET, FILM COATED ORAL at 21:14

## 2022-12-12 RX ADMIN — CASTOR OIL AND BALSAM, PERU: 788; 87 OINTMENT TOPICAL at 09:15

## 2022-12-12 RX ADMIN — Medication 1 AMPULE: at 21:18

## 2022-12-12 RX ADMIN — SODIUM CHLORIDE, PRESERVATIVE FREE 10 ML: 5 INJECTION INTRAVENOUS at 15:12

## 2022-12-12 RX ADMIN — APIXABAN 10 MG: 5 TABLET, FILM COATED ORAL at 09:14

## 2022-12-12 RX ADMIN — CEFEPIME 2 G: 2 INJECTION, POWDER, FOR SOLUTION INTRAVENOUS at 15:12

## 2022-12-12 RX ADMIN — SODIUM CHLORIDE, PRESERVATIVE FREE 10 ML: 5 INJECTION INTRAVENOUS at 06:26

## 2022-12-12 RX ADMIN — AZITHROMYCIN MONOHYDRATE 500 MG: 500 INJECTION, POWDER, LYOPHILIZED, FOR SOLUTION INTRAVENOUS at 22:38

## 2022-12-12 RX ADMIN — SODIUM CHLORIDE, PRESERVATIVE FREE 40 MG: 5 INJECTION INTRAVENOUS at 09:14

## 2022-12-12 NOTE — PROGRESS NOTES
Attended CCU IDR where pt was discussed.   Vince West M.Div, [facility] Paging Service [phone])

## 2022-12-12 NOTE — CONSULTS
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 Discussed options of hospice and wife seems very resistant stating \"We have to let him live as long as possible\"  - He is not a candidate for systemic chemotherapy but he does have KRAS mutated lung cancer so may be candidate for oral sotorasib. - Outpt Radiation referral since family seem resistant to hospice. *) Acute PE and DVT:  - On Eliquis , will need lifelong AC    *) PNA:  - per primary    *) Malignant pleural effusion:  - Has Aspira and drains 3x per week. Thank you for this kind referral, we will follow along with you    Discussion: Nando Bowens is a  80y.o. year old seen in consultation at the request of Dr. Sarina Waggoner for [address] stage IV. Mr Sherry Aguilar was admitted with acute respiratory failure and noted to have acute PE and RLL PNA. He is watiing tx to floor from ICU and off supplemental o2. He feels he is improving and closer to his baseline. Still having HA and MRI on 12/9/22 shows new brain mets. Imaging:  IMPRESSION  brain metastases.     Labs:  Recent Results (from the past 24 hour(s))   GLUCOSE, POC    Collection Time: 12/11/22  5:04 PM   Result Value Ref Range    Glucose (POC) 158 (H) 65 - 117 mg/dL    Performed by Hernán Baez RN    GLUCOSE, POC    Collection Time: 12/11/22 10:03 PM   Result Value Ref Range    Glucose (POC) 112 65 - 117 mg/dL    Performed by Rose Pratt RN    CBC WITH AUTOMATED DIFF    Collection Time: 12/12/22  3:27 AM   Result Value Ref Range    WBC 18.3 (H) 4.1 - 11.1 K/uL    RBC 3.75 (L) 4.10 - 5.70 M/uL    HGB 10.0 (L) 12.1 - 17.0 g/dL    HCT 30.8 (L) 36.6 - 50.3 %    MCV 82.1 80.0 - 99.0 FL    MCH 26.7 26.0 - 34.0 PG    MCHC 32.5 30.0 - 36.5 g/dL    RDW 15.1 (H) 11.5 - 14.5 %    PLATELET 048 927 - 645 K/uL    MPV 9.6 8.9 - 12.9 FL    NRBC 0.0 0  WBC    ABSOLUTE NRBC 0.00 0.00 - 0.01 K/uL    NEUTROPHILS 86 (H) 32 - 75 %    LYMPHOCYTES 7 (L) 12 - 49 %    MONOCYTES 6 5 - 13 %    EOSINOPHILS 0 0 - 7 %    BASOPHILS 0 0 - 1 %    IMMATURE GRANULOCYTES 1 (H) 0.0 - 0.5 %    ABS. NEUTROPHILS 15.9 (H) 1.8 - 8.0 K/UL    ABS. LYMPHOCYTES 1.2 0.8 - 3.5 K/UL    ABS. MONOCYTES 1.1 (H) 0.0 - 1.0 K/UL    ABS. EOSINOPHILS 0.0 0.0 - 0.4 K/UL    ABS. BASOPHILS 0.0 0.0 - 0.1 K/UL    ABS. IMM.  GRANS. 0.1 (H) 0.00 - 0.04 K/UL    DF AUTOMATED     METABOLIC PANEL, BASIC    Collection Time: 12/12/22  3:27 AM   Result Value Ref Range    Sodium 139 136 - 145 mmol/L    Potassium 4.4 3.5 - 5.1 mmol/L    Chloride 107 97 - 108 mmol/L    CO2 26 21 - 32 mmol/L    Anion gap 6 5 - 15 mmol/L    Glucose 116 (H) 65 - 100 mg/dL    BUN 21 (H) 6 - 20 MG/DL    Creatinine 0.93 0.70 - 1.30 MG/DL    BUN/Creatinine ratio 23 (H) 12 - 20      eGFR >60 >60 ml/min/1.73m2    Calcium 9.1 8.5 - 10.1 MG/DL   MAGNESIUM    Collection Time: 12/12/22  3:27 AM   Result Value Ref Range    Magnesium 1.8 1.6 - 2.4 mg/dL   PHOSPHORUS    Collection Time: 12/12/22  3:27 AM   Result Value Ref Range    Phosphorus 3.1 2.6 - 4.7 MG/DL   GLUCOSE, POC    Collection Time: 12/12/22  8:06 AM   Result Value Ref Range    Glucose (POC) 100 65 - 117 mg/dL    Performed by Elie Beck (GULSHAN RN)        History:  Past Medical History:   Diagnosis Date    Anemia     r/t chemo    Arthritis     BPH without obstruction/lower urinary tract symptoms 01/16/2018    Bronchogenic carcinoma of right lung ([facility] 05/2022    bronchogenic carcinoma centered posteriorly in the right upper lobe obstructing the posterior segmental bronchus of the right upper lobe; Stage IIIB; systemic therapy consisting of carboplatinum, Abraxane and Keytruda    Diet-controlled diabetes mellitus ([facility]     Diverticulosis     Drug-induced polyneuropathy ([facility] 01/16/2018    Due to chemotherapy    GERD with stricture 01/16/2018    Status post dilatation    History of esophageal cancer 2008    tx esophagectomy, chemo, radiation (complete 2014)    History of gout     Hx of colonic polyps     Hyperkalemia 04/29/2019    Kidney cyst     Malignant pleural effusion     With indwelling plerex catheter    CHAVEZ (nonalcoholic steatohepatitis) 2000    \"fatty liver\" diet related    Personal history of colonic polyps 12/06/2012    Primary hypertension     Stage 3 chronic kidney disease ([facility] 04/29/2019    Thrombocytopenia ([facility]     r/t chemo    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 [address]  7/2/2021         HX CATARACT REMOVAL Bilateral     HX CHOLECYSTECTOMY  9/29/11    HX ESOPHAGECTOMY  2008    HX HEMORRHOIDECTOMY      HX HERNIA REPAIR      HX ROTATOR CUFF REPAIR Right 2014    HX VASCULAR ACCESS Right 2008    port placed and removed in 2014    IR INSERT CATH PLEURAL INDWELL  5/24/2022    IR INSERT TUNL CVC W PORT OVER 5 YEARS  5/19/2022    IR REMOVE TUNL CVAD W PORT/PUMP  7/7/2022    AZ COLSC FLX W/RMVL OF TUMOR POLYP LESION SNARE TQ  7/1/2010         AZ EGD BALLOON DILATION ESOPHAGUS <30 MM DIAM  3/3/2011         AZ EGD BALLOON DILATION ESOPHAGUS <30 MM DIAM  5/22/2012         AZ EGD TRANSORAL BIOPSY SINGLE/MULTIPLE  3/29/2012         UPPER GI ENDOSCOPY,BALL DIL,30MM  1/11/2018         UPPER GI ENDOSCOPY,BALL DIL,30MM  1/31/2020           Prior to Admission medications    Medication Sig Start Date End Date Taking?  Authorizing Provider   Hydromet 5-1.5 mg/5 mL oral solution  11/30/22   Provider, Historical   methylphenidate HCl (RITALIN) 5 mg tablet  22   Provider, Historical   mirtazapine (REMERON) 30 mg tablet  12/10/22   Provider, Historical   HYDROcodone-acetaminophen (NORCO) 5-325 mg per tablet  22   Provider, Historical   pembrolizumab (Mary [place] IV) Every 3 weeks 22   Provider, Historical   carbamide peroxide (DEBROX) 6.5 % otic solution Administer 5 Drops into each ear two (2) times a day. 22   Jaqueline Granger MD   cholecalciferol, vitamin D3, 50 mcg (2,000 unit) tab Take 1 Tab by mouth daily. Provider, Historical   esomeprazole (NEXIUM) 40 mg capsule TAKE 1 CAPSULE DAILY 19   Antony Hill MD   sildenafil citrate (VIAGRA) 50 mg tablet Take 50 mg by mouth as needed. Provider, Historical   CYANOCOBALAMIN (VITAMIN B-12 PO) Take 1,000 mg by mouth daily. 7/1/10   Provider, Historical   pyridoxine, vitamin B6, (VITAMIN B-6) 100 mg tablet Take 100 mg by mouth daily. Provider, Historical   MULTIVITAMINS (MULTIVITAMIN PO) Take 1 Tablet by mouth daily.  7/1/10   Provider, Historical     No Known Allergies   Social History     Tobacco Use    Smoking status: Former     Packs/day: 2.00     Years: 25.00     Pack years: 50.00     Types: Cigarettes     Quit date: 1987     Years since quittin.9    Smokeless tobacco: Never   Substance Use Topics    Alcohol use: No      Family History   Problem Relation Age of Onset    Diabetes Mother     OSTEOARTHRITIS Father     Cancer Sister         lung    Cancer Sister         breast        Current Medications:  Current Facility-Administered Medications   Medication Dose Route Frequency    sodium chloride (NS) flush 5-40 mL  5-40 mL IntraVENous Q8H    sodium chloride (NS) flush 5-40 mL  5-40 mL IntraVENous PRN    acetaminophen (TYLENOL) tablet 650 mg  650 mg Oral Q6H PRN    Or    acetaminophen (TYLENOL) suppository 650 mg  650 mg Rectal Q6H PRN    polyethylene glycol (MIRALAX) packet 17 g  17 g Oral DAILY PRN    azithromycin (ZITHROMAX) 500 mg in 0.9% sodium chloride 250 mL ([id number])  500 mg IntraVENous Q24H    albuterol-ipratropium (DUO-NEB) 2.5 MG-0.5 MG/3 ML  3 mL Nebulization Q4H PRN    pantoprazole (PROTONIX) 40 mg in 0.9% sodium chloride 10 mL injection  40 mg IntraVENous DAILY    vancomycin (VANCOCIN) 1,000 mg in 0.9% sodium chloride 250 mL (Hall6Svz)  1,000 mg IntraVENous Q24H    cefepime (MAXIPIME) 2 g in 0.9% sodium chloride (MBP/ADV) 100 mL MBP  2 g IntraVENous Q12H    alcohol 62% (NOZIN) nasal  1 Ampule  1 Ampule Topical Q12H    apixaban (ELIQUIS) tablet 10 mg  10 mg Oral BID    [START ON 12/18/2022] apixaban (ELIQUIS) tablet 5 mg  5 mg Oral BID    pyridoxine (vitamin B6) (VITAMIN B-6) tablet 50 mg  50 mg Oral DAILY    cyanocobalamin (VITAMIN B12) tablet 1,000 mcg  1,000 mcg Oral DAILY    insulin lispro (HUMALOG) injection   SubCUTAneous AC&HS    glucose chewable tablet 16 g  4 Tablet Oral PRN    glucagon (GLUCAGEN) injection 1 mg  1 mg IntraMUSCular PRN    dextrose 10% infusion 0-250 mL  0-250 mL IntraVENous PRN    balsam peru-castor oiL (VENELEX) ointment   Topical BID         Review of Systems:  Constitutional No fevers, chills, night sweats, excessive fatigue or weight loss. Allergic/Immunologic No recent allergic reactions   Eyes No significant visual difficulties. No diplopia. ENMT No problems with hearing, no sore throat, no sinus drainage. Endocrine No hot flashes or night sweats. No cold intolerance, polyuria, or polydipsia   Hematologic/Lymphatic No easy bruising or bleeding. The patient denies any tender or palpable lymph nodes   Breasts No abnormal masses of breast, nipple discharge or pain. Respiratory No dyspnea on exertion, orthopnea, chest pain, cough or hemoptysis. Cardiovascular No anginal chest pain, irregular heart beat, tachycardia, palpitations or orthopnea. Gastrointestinal No nausea, vomiting, diarrhea, constipation, cramping, dysphagia, reflux, heartburn, GI bleeding, or early satiety. No change in bowel habits. Genitourinary (M) No hematuria, dysuria, increased frequency, urgency, hesitancy or incontinence. Musculoskeletal No joint pain, swelling or redness. No decreased range of motion. Integumentary No chronic rashes, inflammation, ulcerations, pruritus, petechiae, purpura, ecchymoses, or skin changes. Neurologic No headache, blurred vision, and no areas of focal weakness or numbness. Normal gait. No sensory problems. Psychiatric No insomnia, depression, jayden or mood swings. No psychotropic drugs. Physical Exam:  Constitutional Alert, cooperative, oriented. Mood and affect appropriate. Appears close to chronological age. Head Normocephalic; no scars   Eyes Conjunctivae and sclerae are clear and without icterus. Pupils are reactive and equal.   ENMT Sinuses are nontender. No oral exudates, ulcers, masses, thrush or mucositis. Oropharynx clear. Tongue normal.   Neck Supple without masses or thyromegaly. No jugular venous distension. Hematologic/Lymphatic No petechiae or purpura. No tender or palpable lymph nodes in the cervical, supraclavicular, axillary or inguinal area. Respiratory Decreased at bases, aspira in place   Cardiovascular Regular rate and rhythm of heart without murmurs, gallops or rubs. Chest / Line Site Chest is symmetric with no chest wall deformities. Abdomen Non-tender, non-distended, no masses, ascites or hepatosplenomegaly. Good bowel sounds. No guarding or rebound tenderness. No pulsatile masses. Musculoskeletal No tenderness or swelling, normal range of motion without obvious weakness. Extremities No visible deformities, no cyanosis, clubbing or edema. Skin No rashes, scars, or lesions suggestive of malignancy. No petechiae, purpura, or ecchymoses. No excoriations. Neurologic No sensory or motor deficits, normal cerebellar function, normal gait, cranial nerves intact. Psychiatric Alert and oriented times three. Coherent speech.  Verbalizes understanding of our discussions today.

## 2022-12-12 NOTE — PROGRESS NOTES
Hospitalist Progress Note    NAME: Heather Hartmann   :  1940   MRN:  [id number]       Assessment / Plan:    Acute hypoxic respiratory failure due to Pulmonary emboli + RLL PNA-improved  Left femoral DVT  -CTA chest done and showed acute pulmonary embolism in the left lower lobar and left upper lobe segmental pulmonary artery levels, increased size of right lung masses and right lower lobe pneumonia  -Off  Bipap and Off O2 now  -Procal Neg  -trop 179,   -covid and flu negative  -goal SPO2 92-97%  -Patient was started on Eliquis 10 mg twice daily x7 days, then transition to 5 mg twice daily  -TTE to r/o rt heart strain  -cefepime, vanc, and azithromycin for broad spectrum coverage of RLL PNA  -duonebs Q 4 hour prn     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 Code status: DNR  Prophylaxis: Eliquis  Recommended Disposition:  TBD   Anticipated Discharge Date:  1-2 days       Subjective:       Patient was seen and examined this morning. Patient denies any new complaints. Shortness of breath has improved significantly. Patient is currently on room air patient denies fever, chills, shortness of breath, chest pain, abdominal pain, nausea, vomiting, and diarrhea.   No overnight events reported by nursing staff. iew of Systems:  Symptom Y/N Comments  Symptom Y/N Comments   Fever/Chills    Chest Pain     Poor Appetite    Edema     Cough    Abdominal Pain     Sputum    Joint Pain     SOB/GREGG    Pruritis/Rash     Nausea/vomit    Tolerating PT/OT     Diarrhea    Tolerating Diet     Constipation    Other       PO intake: No data found. Wt Readings from Last 10 Encounters:   12/11/22 65.3 kg (143 lb 14.4 oz)   12/06/22 66 kg (145 lb 6.4 oz)   10/06/22 66.7 kg (147 lb)   09/30/22 66.7 kg (147 lb)   09/19/22 66.9 kg (147 lb 7.8 oz)   09/02/22 66.3 kg (146 lb 3.2 oz)   07/07/22 67.1 kg (148 lb)   06/28/22 67.1 kg (148 lb)   05/24/22 63.5 kg (140 lb)   05/19/22 67.1 kg (148 lb)       Objective:     VITALS:   Last 24hrs VS reviewed since prior progress note.  Most recent are:  Patient Vitals for the past 24 hrs:   Temp Pulse Resp BP SpO2   12/12/22 1150 -- -- -- -- 94 %   12/12/22 1100 -- 86 19 94/79 93 %   12/12/22 1000 -- 90 19 108/64 93 %   12/12/22 0900 -- 75 19 125/66 98 %   12/12/22 0800 -- 79 20 109/81 99 %   12/12/22 0730 -- -- -- -- 93 %   12/12/22 0700 -- 74 14 104/77 99 %   12/12/22 0600 -- 91 26 117/72 95 %   12/12/22 0500 -- 79 21 103/74 94 %   12/12/22 0400 -- 76 12 93/68 97 %   12/12/22 0352 -- 75 -- -- --   12/12/22 0300 -- 78 14 96/65 98 %   12/12/22 0200 -- 93 23 117/78 98 %   12/12/22 0100 -- 84 18 108/61 97 %   12/12/22 0000 98.6 °F (37 °C) 82 20 (!) 86/66 98 %   12/11/22 2300 -- 85 17 (!) 89/65 97 %   12/11/22 2200 -- 87 18 92/65 96 %   12/11/22 2100 -- 86 18 90/60 95 %   12/11/22 2000 98.5 °F (36.9 °C) 88 19 (!) 81/57 95 %   12/11/22 1930 -- 90 22 (!) 88/64 95 %   12/11/22 1900 -- 94 22 96/62 95 %   12/11/22 1600 98.1 °F (36.7 °C) 91 20 91/69 95 %   12/11/22 1525 -- -- -- -- 97 %   12/11/22 1200 98.3 °F (36.8 °C) 99 25 94/66 94 %         Intake/Output Summary (Last 24 hours) at 12/12/2022 1154  Last data filed at 12/12/2022 0807  Gross per 24 hour   Intake 670 ml   Output 1101 ml   Net -431 ml          I had a face to face encounter, and independently examined this patient as outlined below:    PHYSICAL EXAM:  General:    No distress     HEENT: Atraumatic, anicteric sclerae, pink conjunctivae, MMM  Neck:  Supple, symmetrical  Lungs:   CTA. No Wheezing/Rhonchi. No rales. No tenderness. No Accessory muscle use. CVS:   Regular rhythm. No murmur. No JVD   GI/:   Soft. NT. ND. BS normal  Extremities: No edema. No cyanosis. No clubbing. Skin:     Not pale. Not Jaundiced. No rashes   Psych:  Good insight. Not depressed. Not anxious or agitated. Neurologic: Alert and oriented X 4. EOMs intact. No facial asymmetry. No slurred speech. Symmetrical strength, Sensation grossly intact. Labs     I reviewed today's most current labs and imaging studies. Pertinent labs include:  Recent Labs     12/12/22  0327 12/11/22  0422 12/10/22  2237   WBC 18.3* 16.0* 12.0*   HGB 10.0* 12.2 12.6   HCT 30.8* 36.9 38.5    313 280       Recent Labs     12/12/22  0327 12/11/22  0422 12/10/22  2237    135* 136   K 4.4 4.1 4.5    102 102   CO2 26 25 28   * 204* 122*   BUN 21* 20 22*   CREA 0.93 1.13 0.96   CA 9.1 9.4 9.3   MG 1.8 1.8  --    PHOS 3.1 2.7  --    ALB  --  2.3* 2.4*   TBILI  --  0.5 0.3   ALT  --  20 20   INR  --  1.1  --        CTA CHEST W OR W WO CONT    Result Date: 12/11/2022  1. Left upper and lower lobe segmental and lobar pulmonary embolism. 2.  No right-sided pulmonary embolism. 3.  Increased size of right perihilar lung mass. 4.  Small right pleural effusion with pleural drainage catheter in place. 5.  Heterogeneous attenuation of the right lower lobe suggestive of pneumonia. XR CHEST PORT    Result Date: 12/10/2022  Increased small to moderate right pleural effusion and right basilar opacity that may represent atelectasis, edema, or infection. Right chest tube in place. DUPLEX LOWER EXT VENOUS BILAT    Result Date: 12/11/2022  : Left femoral DVT. No right DVT. DUPLEX LOWER EXT VENOUS BILAT    Result Date: 12/11/2022  : Left femoral DVT. No right DVT. All Micro Results       Procedure Component Value Units Date/Time    CULTURE, BLOOD [[id number]] Collected: 12/10/22 2211    Order Status: Completed Specimen: Blood Updated: 12/12/22 0757     Special Requests: NO SPECIAL REQUESTS        Culture result: NO GROWTH 2 DAYS       CULTURE, BLOOD [[id number]] Collected: 12/10/22 2237    Order Status: Completed Specimen: Blood Updated: 12/12/22 0757     Special Requests: NO SPECIAL REQUESTS        Culture result: NO GROWTH 2 DAYS       CULTURE, BODY FLUID Oswaldo Davila [[id number]] Collected: 12/11/22 0159    Order Status: Completed Specimen: Body Fluid from Pleural Fluid Updated: 12/12/22 0624     Special Requests: NO SPECIAL REQUESTS        GRAM STAIN RARE WBCS SEEN        Culture result:       NO GROWTH THUS FAR Culture performed on Fluid swab specimen          COVID-19 RAPID TEST [[id number]] Collected: 12/11/22 0203    Order Status: Completed Specimen: Nasopharyngeal Updated: 12/11/22 0225     Specimen source Nasopharyngeal        COVID-19 rapid test Not detected        Comment: Rapid Abbott ID Now       Rapid NAAT:  The specimen is NEGATIVE for SARS-CoV-2, the novel coronavirus associated with COVID-19. Negative results should be treated as presumptive and, if inconsistent with clinical signs and symptoms or necessary for patient management, should be tested with an alternative molecular assay. Negative results do not preclude SARS-CoV-2 infection and should not be used as the sole basis for patient management decisions. This test has been authorized by the FDA under an Emergency Use Authorization (EUA) for use by authorized laboratories.    Fact sheet for Healthcare Providers:  http://www.dale-johnson.biz/  Fact sheet for Patients: http://www.hunter-johnson.biz/       Methodology: Isothermal Nucleic Acid Amplification Current Medications:     Current Facility-Administered Medications:     sodium chloride (NS) flush 5-40 mL, 5-40 mL, IntraVENous, Q8H, Meenu Romero NP, 10 mL at 12/12/22 0737    sodium chloride (NS) flush 5-40 mL, 5-40 mL, IntraVENous, PRN, Meenu Galvan NP    acetaminophen (TYLENOL) tablet 650 mg, 650 mg, Oral, Q6H PRN **OR** acetaminophen (TYLENOL) suppository 650 mg, 650 mg, Rectal, Q6H PRN, Meenu Galvan NP    polyethylene glycol (MIRALAX) packet 17 g, 17 g, Oral, DAILY PRN, Meenu Galvan NP    azithromycin (ZITHROMAX) 500 mg in 0.9% sodium chloride 250 mL ([id number]), 500 mg, IntraVENous, Q24H, Smiley BASHIR MD, Last Rate: 250 mL/hr at 12/11/22 2204, 500 mg at 12/11/22 2204    albuterol-ipratropium (DUO-NEB) 2.5 MG-0.5 MG/3 ML, 3 mL, Nebulization, Q4H PRN, Meenu Galvan NP    pantoprazole (PROTONIX) 40 mg in 0.9% sodium chloride 10 mL injection, 40 mg, IntraVENous, DAILY, Meenu Galvan NP, 40 mg at 12/12/22 0914    vancomycin (VANCOCIN) 1,000 mg in 0.9% sodium chloride 250 mL ([id number]), 1,000 mg, IntraVENous, Q24H, Esthela Dumont MD, Last Rate: 250 mL/hr at 12/12/22 0625, 1,000 mg at 12/12/22 8399    cefepime (MAXIPIME) 2 g in 0.9% sodium chloride (MBP/ADV) 100 mL MBP, 2 g, IntraVENous, Q12H, Mustapha Ellison MD, Last Rate: 25 mL/hr at 12/12/22 0326, 2 g at 12/12/22 0326    alcohol 62% (NOZIN) nasal  1 Ampule, 1 Ampule, Topical, Q12H, Mustapha Ellison MD, 1 Ampule at 12/12/22 0914    apixaban (ELIQUIS) tablet 10 mg, 10 mg, Oral, BID, Esthela Dumont MD, 10 mg at 12/12/22 0914    [START ON 12/18/2022] apixaban (ELIQUIS) tablet 5 mg, 5 mg, Oral, BID, Esthela Dumont MD    pyridoxine (vitamin B6) (VITAMIN B-6) tablet 50 mg, 50 mg, Oral, DAILY, Mikal Chirinos MD, 50 mg at 12/12/22 0913    cyanocobalamin (VITAMIN B12) tablet 1,000 mcg, 1,000 mcg, Oral, DAILY, Mikal Chirinos MD, 1,000 mcg at 12/12/22 0913    insulin lispro (HUMALOG) injection, , SubCUTAneous, AC&HS, Oswaldo Chirinos MD    glucose chewable tablet 16 g, 4 Tablet, Oral, PRN, Godfrey Chirinos MD    glucagon (GLUCAGEN) injection 1 mg, 1 mg, IntraMUSCular, PRN, Oswaldo Chirinos MD    dextrose 10% infusion 0-250 mL, 0-250 mL, IntraVENous, PRN, Godfrey Chirinos MD    balsam peru-castor oiL (VENELEX) ointment, , Topical, BID, Godfrey Chirinos MD, Given at 12/12/22 0915     Procedures: see electronic medical records for all procedures/Xrays and details which were not copied into this note but were reviewed prior to creation of Plan. Reviewed most current lab test results and cultures  YES  Reviewed most current radiology test results   YES  Review and summation of old records today    NO  Reviewed patient's current orders and MAR    YES  PMH/ reviewed - no change compared to H&P  ________________________________________________________________________  Care Plan discussed with:    Comments   Patient x    Family  x    RN     Care Manager     Consultant                        Multidiciplinary team rounds were held today with , nursing, pharmacist and clinical coordinator. Patient's plan of care was discussed; medications were reviewed and discharge planning was addressed.      ________________________________________________________________________  Total NON critical care TIME:  30  Minutes    Total CRITICAL CARE TIME Spent:   Minutes non procedure based      Comments   >50% of visit spent in counseling and coordination of care x     This includes time during multidisciplinary rounds if indicated above   ________________________________________________________________________  Flash Florez DO

## 2022-12-12 NOTE — PROGRESS NOTES
Problem: Pressure Injury - Risk of  Goal: *Prevention of pressure injury  Description: Document Wesley Scale and appropriate interventions in the flowsheet. Outcome: Progressing Towards Goal  Note: Pressure Injury Interventions: Activity Interventions: Pressure redistribution bed/mattress(bed type), Increase time out of bed    Mobility Interventions: Assess need for specialty bed, HOB 30 degrees or less    Nutrition Interventions: Document food/fluid/supplement intake, Offer support with meals,snacks and hydration    Friction and Shear Interventions: Apply protective barrier, creams and emollients                Problem: Patient Education: Go to Patient Education Activity  Goal: Patient/Family Education  Outcome: Progressing Towards Goal     Problem: Falls - Risk of  Goal: *Absence of Falls  Description: Document Rohini Fall Risk and appropriate interventions in the flowsheet.   Outcome: Progressing Towards Goal  Note: Fall Risk Interventions:  Mobility Interventions: Bed/chair exit alarm, Strengthening exercises (ROM-active/passive)    Mentation Interventions: Adequate sleep, hydration, pain control, Bed/chair exit alarm, Room close to nurse's station    Medication Interventions: Bed/chair exit alarm         History of Falls Interventions: Bed/chair exit alarm, Room close to nurse's station         Problem: Patient Education: Go to Patient Education Activity  Goal: Patient/Family Education  Outcome: Progressing Towards Goal

## 2022-12-12 NOTE — PROGRESS NOTES
TRANSFER - IN REPORT:    [facility]  [facility]U(unit) for routine progression of care      Report consisted of patients Situation, Background, Assessment and   Recommendations(SBAR). Information from the following report(s) SBAR, Kardex, MAR, and Recent Results was reviewed with the receiving nurse. Opportunity for questions and clarification was provided. Assessment completed upon patients arrival to unit and care assumed. Pt arrived to room 2210, A&O, denies pain VSS, dual skin assessment completed with [facility]. Sacrum redness and blanchable. Pt orient to room and surroundings.  Family at bedside

## 2022-12-12 NOTE — PROGRESS NOTES
0700 - bedside report received, pt vss, pt removed from 2l nc, sats good  1000 - pt wife and daughter at bedside,updated  1100 - pt up to chair x 1 assist, tolerated well  1330 - hospital md perfect served pertaining to pt pleural drain, wife drains at home and asked to do it here, md agreed, waiting on order to be entered  1500 - wife draining pleural drain at bedside, report called to [facility]  rn  1615 - pt transferred to cpc

## 2022-12-13 PROBLEM — Z51.5 PALLIATIVE CARE BY SPECIALIST: Status: ACTIVE | Noted: 2022-12-13

## 2022-12-13 PROBLEM — R53.81 PHYSICAL DEBILITY: Status: ACTIVE | Noted: 2022-12-13

## 2022-12-13 PROBLEM — R63.0 ANOREXIA: Status: ACTIVE | Noted: 2022-12-13

## 2022-12-13 LAB
ALBUMIN SERPL-MCNC: 2.3 G/DL (ref 3.5–5)
ALBUMIN/GLOB SERPL: 0.5 {RATIO} (ref 1.1–2.2)
ALP SERPL-CCNC: 103 U/L (ref 45–117)
ALT SERPL-CCNC: 35 U/L (ref 12–78)
ANION GAP SERPL CALC-SCNC: 10 MMOL/L (ref 5–15)
AST SERPL-CCNC: 48 U/L (ref 15–37)
ATRIAL RATE: 111 BPM
BASOPHILS # BLD: 0.1 K/UL (ref 0–0.1)
BASOPHILS NFR BLD: 0 % (ref 0–1)
BILIRUB SERPL-MCNC: 0.3 MG/DL (ref 0.2–1)
BUN SERPL-MCNC: 24 MG/DL (ref 6–20)
BUN/CREAT SERPL: 27 (ref 12–20)
CALCIUM SERPL-MCNC: 9.2 MG/DL (ref 8.5–10.1)
CALCULATED P AXIS, ECG09: 68 DEGREES
CALCULATED R AXIS, ECG10: 62 DEGREES
CALCULATED T AXIS, ECG11: 20 DEGREES
CHLORIDE SERPL-SCNC: 106 MMOL/L (ref 97–108)
CO2 SERPL-SCNC: 24 MMOL/L (ref 21–32)
CREAT SERPL-MCNC: 0.89 MG/DL (ref 0.7–1.3)
DATE LAST DOSE: NORMAL
DIAGNOSIS, 93000: NORMAL
DIFFERENTIAL METHOD BLD: ABNORMAL
EOSINOPHIL # BLD: 0.1 K/UL (ref 0–0.4)
EOSINOPHIL NFR BLD: 1 % (ref 0–7)
ERYTHROCYTE [DISTWIDTH] IN BLOOD BY AUTOMATED COUNT: 15.2 % (ref 11.5–14.5)
GLOBULIN SER CALC-MCNC: 4.3 G/DL (ref 2–4)
GLUCOSE BLD STRIP.AUTO-MCNC: 107 MG/DL (ref 65–117)
GLUCOSE BLD STRIP.AUTO-MCNC: 150 MG/DL (ref 65–117)
GLUCOSE BLD STRIP.AUTO-MCNC: 86 MG/DL (ref 65–117)
GLUCOSE BLD STRIP.AUTO-MCNC: 98 MG/DL (ref 65–117)
GLUCOSE SERPL-MCNC: 104 MG/DL (ref 65–100)
HCT VFR BLD AUTO: 36.5 % (ref 36.6–50.3)
HGB BLD-MCNC: 12 G/DL (ref 12.1–17)
IMM GRANULOCYTES # BLD AUTO: 0.1 K/UL (ref 0–0.04)
IMM GRANULOCYTES NFR BLD AUTO: 0 % (ref 0–0.5)
LYMPHOCYTES # BLD: 2 K/UL (ref 0.8–3.5)
LYMPHOCYTES NFR BLD: 15 % (ref 12–49)
MAGNESIUM SERPL-MCNC: 1.6 MG/DL (ref 1.6–2.4)
MCH RBC QN AUTO: 27.3 PG (ref 26–34)
MCHC RBC AUTO-ENTMCNC: 32.9 G/DL (ref 30–36.5)
MCV RBC AUTO: 83.1 FL (ref 80–99)
MONOCYTES # BLD: 1 K/UL (ref 0–1)
MONOCYTES NFR BLD: 7 % (ref 5–13)
NEUTS SEG # BLD: 10.2 K/UL (ref 1.8–8)
NEUTS SEG NFR BLD: 77 % (ref 32–75)
NRBC # BLD: 0 K/UL (ref 0–0.01)
NRBC BLD-RTO: 0 PER 100 WBC
P-R INTERVAL, ECG05: 194 MS
PHOSPHATE SERPL-MCNC: 2.5 MG/DL (ref 2.6–4.7)
PLATELET # BLD AUTO: 322 K/UL (ref 150–400)
PMV BLD AUTO: 10.1 FL (ref 8.9–12.9)
POTASSIUM SERPL-SCNC: 4.1 MMOL/L (ref 3.5–5.1)
PROT SERPL-MCNC: 6.6 G/DL (ref 6.4–8.2)
Q-T INTERVAL, ECG07: 376 MS
QRS DURATION, ECG06: 84 MS
QTC CALCULATION (BEZET), ECG08: 511 MS
RBC # BLD AUTO: 4.39 M/UL (ref 4.1–5.7)
REPORTED DOSE,DOSE: NORMAL UNITS
REPORTED DOSE/TIME,TMG: NORMAL
SERVICE CMNT-IMP: ABNORMAL
SERVICE CMNT-IMP: NORMAL
SODIUM SERPL-SCNC: 140 MMOL/L (ref 136–145)
VANCOMYCIN TROUGH SERPL-MCNC: 6.7 UG/ML (ref 5–10)
VENTRICULAR RATE, ECG03: 111 BPM
WBC # BLD AUTO: 13.5 K/UL (ref 4.1–11.1)

## 2022-12-13 PROCEDURE — 97161 PT EVAL LOW COMPLEX 20 MIN: CPT

## 2022-12-13 PROCEDURE — 97116 GAIT TRAINING THERAPY: CPT

## 2022-12-13 PROCEDURE — 99222 1ST HOSP IP/OBS MODERATE 55: CPT | Performed by: NURSE PRACTITIONER

## 2022-12-13 PROCEDURE — 80053 COMPREHEN METABOLIC PANEL: CPT

## 2022-12-13 PROCEDURE — 85025 COMPLETE CBC W/AUTO DIFF WBC: CPT

## 2022-12-13 PROCEDURE — 74011250636 HC RX REV CODE- 250/636: Performed by: INTERNAL MEDICINE

## 2022-12-13 PROCEDURE — 65660000001 HC RM ICU INTERMED STEPDOWN

## 2022-12-13 PROCEDURE — 74011000258 HC RX REV CODE- 258: Performed by: INTERNAL MEDICINE

## 2022-12-13 PROCEDURE — 97165 OT EVAL LOW COMPLEX 30 MIN: CPT

## 2022-12-13 PROCEDURE — 97162 PT EVAL MOD COMPLEX 30 MIN: CPT

## 2022-12-13 PROCEDURE — 74011000250 HC RX REV CODE- 250: Performed by: STUDENT IN AN ORGANIZED HEALTH CARE EDUCATION/TRAINING PROGRAM

## 2022-12-13 PROCEDURE — 97535 SELF CARE MNGMENT TRAINING: CPT

## 2022-12-13 PROCEDURE — 74011000250 HC RX REV CODE- 250: Performed by: NURSE PRACTITIONER

## 2022-12-13 PROCEDURE — 80202 ASSAY OF VANCOMYCIN: CPT

## 2022-12-13 PROCEDURE — 74011250637 HC RX REV CODE- 250/637: Performed by: STUDENT IN AN ORGANIZED HEALTH CARE EDUCATION/TRAINING PROGRAM

## 2022-12-13 PROCEDURE — 74011250636 HC RX REV CODE- 250/636: Performed by: STUDENT IN AN ORGANIZED HEALTH CARE EDUCATION/TRAINING PROGRAM

## 2022-12-13 PROCEDURE — 74011250636 HC RX REV CODE- 250/636: Performed by: NURSE PRACTITIONER

## 2022-12-13 PROCEDURE — 82962 GLUCOSE BLOOD TEST: CPT

## 2022-12-13 PROCEDURE — 83735 ASSAY OF MAGNESIUM: CPT

## 2022-12-13 PROCEDURE — 74011250637 HC RX REV CODE- 250/637: Performed by: GENERAL ACUTE CARE HOSPITAL

## 2022-12-13 PROCEDURE — C9113 INJ PANTOPRAZOLE SODIUM, VIA: HCPCS | Performed by: NURSE PRACTITIONER

## 2022-12-13 PROCEDURE — 74011250637 HC RX REV CODE- 250/637: Performed by: INTERNAL MEDICINE

## 2022-12-13 PROCEDURE — 36415 COLL VENOUS BLD VENIPUNCTURE: CPT

## 2022-12-13 PROCEDURE — 84100 ASSAY OF PHOSPHORUS: CPT

## 2022-12-13 RX ORDER — MAGNESIUM SULFATE HEPTAHYDRATE 40 MG/ML
2 INJECTION, SOLUTION INTRAVENOUS ONCE
Status: COMPLETED | OUTPATIENT
Start: 2022-12-13 | End: 2022-12-13

## 2022-12-13 RX ORDER — METOPROLOL TARTRATE 25 MG/1
12.5 TABLET, FILM COATED ORAL EVERY 12 HOURS
Status: DISCONTINUED | OUTPATIENT
Start: 2022-12-13 | End: 2022-12-14

## 2022-12-13 RX ORDER — MAGNESIUM SULFATE 1 G/100ML
1 INJECTION INTRAVENOUS ONCE
Status: COMPLETED | OUTPATIENT
Start: 2022-12-13 | End: 2022-12-13

## 2022-12-13 RX ORDER — CEFDINIR 300 MG/1
300 CAPSULE ORAL EVERY 12 HOURS
Status: DISCONTINUED | OUTPATIENT
Start: 2022-12-13 | End: 2022-12-15 | Stop reason: HOSPADM

## 2022-12-13 RX ORDER — DOXYCYCLINE HYCLATE 100 MG
100 TABLET ORAL EVERY 12 HOURS
Status: DISCONTINUED | OUTPATIENT
Start: 2022-12-13 | End: 2022-12-15 | Stop reason: HOSPADM

## 2022-12-13 RX ADMIN — MAGNESIUM SULFATE HEPTAHYDRATE 2 G: 40 INJECTION, SOLUTION INTRAVENOUS at 12:28

## 2022-12-13 RX ADMIN — CASTOR OIL AND BALSAM, PERU: 788; 87 OINTMENT TOPICAL at 09:23

## 2022-12-13 RX ADMIN — CEFEPIME 2 G: 2 INJECTION, POWDER, FOR SOLUTION INTRAVENOUS at 02:54

## 2022-12-13 RX ADMIN — APIXABAN 10 MG: 5 TABLET, FILM COATED ORAL at 21:50

## 2022-12-13 RX ADMIN — SODIUM CHLORIDE, PRESERVATIVE FREE 10 ML: 5 INJECTION INTRAVENOUS at 05:05

## 2022-12-13 RX ADMIN — PYRIDOXINE HCL TAB 50 MG 50 MG: 50 TAB at 09:22

## 2022-12-13 RX ADMIN — CEFDINIR 300 MG: 300 CAPSULE ORAL at 12:28

## 2022-12-13 RX ADMIN — Medication 1 AMPULE: at 21:55

## 2022-12-13 RX ADMIN — SODIUM CHLORIDE, PRESERVATIVE FREE 10 ML: 5 INJECTION INTRAVENOUS at 21:50

## 2022-12-13 RX ADMIN — SODIUM CHLORIDE, PRESERVATIVE FREE 40 MG: 5 INJECTION INTRAVENOUS at 09:22

## 2022-12-13 RX ADMIN — APIXABAN 10 MG: 5 TABLET, FILM COATED ORAL at 09:22

## 2022-12-13 RX ADMIN — SODIUM CHLORIDE, PRESERVATIVE FREE 10 ML: 5 INJECTION INTRAVENOUS at 14:00

## 2022-12-13 RX ADMIN — METOPROLOL TARTRATE 12.5 MG: 25 TABLET, FILM COATED ORAL at 21:51

## 2022-12-13 RX ADMIN — CASTOR OIL AND BALSAM, PERU: 788; 87 OINTMENT TOPICAL at 21:53

## 2022-12-13 RX ADMIN — CEFDINIR 300 MG: 300 CAPSULE ORAL at 21:50

## 2022-12-13 RX ADMIN — METOPROLOL TARTRATE 12.5 MG: 25 TABLET, FILM COATED ORAL at 12:28

## 2022-12-13 RX ADMIN — Medication 1000 MCG: at 09:22

## 2022-12-13 RX ADMIN — DOXYCYCLINE HYCLATE 100 MG: 100 TABLET, COATED ORAL at 21:51

## 2022-12-13 RX ADMIN — POTASSIUM PHOSPHATE, MONOBASIC AND POTASSIUM PHOSPHATE, DIBASIC: 224; 236 INJECTION, SOLUTION, CONCENTRATE INTRAVENOUS at 13:24

## 2022-12-13 RX ADMIN — DOXYCYCLINE HYCLATE 100 MG: 100 TABLET, COATED ORAL at 12:28

## 2022-12-13 RX ADMIN — VANCOMYCIN HYDROCHLORIDE 1000 MG: 1 INJECTION, POWDER, LYOPHILIZED, FOR SOLUTION INTRAVENOUS at 05:01

## 2022-12-13 RX ADMIN — MAGNESIUM SULFATE HEPTAHYDRATE 1 G: 1 INJECTION, SOLUTION INTRAVENOUS at 15:39

## 2022-12-13 NOTE — PROGRESS NOTES
Physician Progress Note      Merced Maurice  CSN #:                  [id number]  :                       1940  ADMIT DATE:       12/10/2022 9:49 PM  [address] [place] [address] DATE:  RESPONDING  PROVIDER #:        Gee Murrieta DO          QUERY TEXT:    Patient admitted with PE and noted to have wbc 13 (18) lac 1.79, procal 0.08, hr 116-127. If possible, please document in progress notes and discharge summary if you are evaluating and/or treating any of the following: The medical record reflects the following:  Risk Factors: PE, NSCLC, Adenocarcinoma with progression and new brain mets  Clinical Indicators: wbc 13 (18) lac 1.79, procal 0.08, hr 116-127, resp failure  Treatment: O2, Eliquis, TTE pending  Thanks,  Neida Cantrell RN/CDI   Options provided:  -- SIRS of non-infectious origin due to PE with associated acute organ dysfunction)  -- Other - I will add my own diagnosis  -- Disagree - Not applicable / Not valid  -- Disagree - Clinically unable to determine / Unknown  -- Refer to Clinical Documentation Reviewer    PROVIDER RESPONSE TEXT:    Provider is clinically unable to determine a response to this query.     Query created by: Marilee Yeh on 2022 10:01 AM      Electronically signed by:  Gee Murrieta DO 2022 3:11 PM

## 2022-12-13 NOTE — PROGRESS NOTES
12/13/22 1045   Vitals   Temp 97.7 °F (36.5 °C)   Temp Source Oral   Pulse (Heart Rate) (!) 125   Heart Rate Source Monitor   Resp Rate 25   O2 Sat (%) 93 %   Level of Consciousness 0   /84   MAP (Calculated) 91   BP 1 Location Right upper arm   BP 1 Method Automatic   BP Patient Position Standing   MEWS Score 4     Tachy working with PT/OT.  Came down with rest.

## 2022-12-13 NOTE — PROGRESS NOTES
Pharmacy Antimicrobial Kinetic Dosing    Indication for Antimicrobials: CAP     Current Regimen of Each Antimicrobial:  Vancomycin 1000mg IV q24h - started  day 3  Cefepime 2g IV Q12H - started  day 3  Azithromycin 500 mg IV Q24H - started 12/10 day 4    Vancomycin Goal Level: AUC: 400-600 mg/hr/Liter/day    Date Dose & Interval Measured (mcg/mL) Predicted AUC/EARL    2:16 1000 mg q24h 6.7 330                 Dosing calculator used: [facility] [employer]    Significant Positive Cultures:   12/10 - Blood - NGTD   Body Fluid - NGTD Pending    Conditions for Dosing Consideration: None    Labs:  Recent Labs     22  0216 227 12/11/22  0422 12/10/22  2237   CREA 0.89 0.93 1.13 0.96   BUN 24* 21* 20 22*   PCT  --   --   --  0.08     Recent Labs     22  0216 227 12/11/22  0422 12/10/22  2237   WBC 13.5* 18.3* 16.0* 12.0*     Temp (24hrs), Av.9 °F (36.6 °C), Min:97.3 °F (36.3 °C), Max:98.2 °F (36.8 °C)    Creatinine Clearance (mL/min):   CrCl (Adjusted Body Weight): 61.9 mL/min  If actual weight < IBW: CrCl (Actual Body Weight) 58.8    Impression/Plan:   Continue cefepime and azithromycin   The vancomycin level resulted at 6.7 mcg/ml (). Will increase the dose to 1000 mg IV q18hr  for a predicted AUC of 434. Antimicrobial stop date - 7 days     Pharmacy will follow daily and adjust medications as appropriate for renal function and/or serum levels.     Thank you,  Bryn Snyder, PHARMD

## 2022-12-13 NOTE — PROGRESS NOTES
Problem: Self Care Deficits Care Plan (Adult)  Goal: *Acute Goals and Plan of Care (Insert Text)  Description: FUNCTIONAL STATUS PRIOR TO ADMISSION: Patient was modified independent using  a SPC for functional mobility. HOME SUPPORT: The patient lived with family but did not require assist.    Occupational Therapy Goals  Initiated 12/13/2022  1. Patient will perform grooming at the sink with modified independence within 7 day(s). 2.  Patient will perform bathing at the sink with modified independence within 7 day(s). 3.  Patient will perform lower body dressing with independence within 7 day(s). 4.  Patient will perform toilet transfers with modified independence within 7 day(s). 5.  Patient will perform all aspects of toileting with independence within 7 day(s). Outcome: Not Met  OCCUPATIONAL THERAPY EVALUATION  Patient: Carmelo Jones (33 y.o. male)  Date: 12/13/2022  Primary Diagnosis: Acute respiratory failure with hypoxia ([facility] [address] [J96.01]       Precautions:   Fall    ASSESSMENT  Based on the objective data described below, the patient presents with decreased endurance,increased HR with movement, and decreased ADLs and functional mobility. Pt was living at home with family and stated that he just purchased a rollator and had not put it together yet. Pt was in bed and family in room, and he was SBA to supervision for bed mobility, and was able to stand with SBA and SBA to get to chair. Pt's HR increased to 120-125 when he was standing and walking but as soon as he sat down HR decreased to 115 and continued to drop. Pt left in chair with all VSS and family in room. Family asked how to check pts HR and recommended a pulse ox at home. Current Level of Function Impacting Discharge (ADLs/self-care): mod to min for ADLs and HR increases to over 120    Functional Outcome Measure: The patient scored 50/100   on the Barthel outcome measure which is indicative of mod to min and increased HR. Patient will benefit from skilled therapy intervention to address the above noted impairments. PLAN :  Recommendations and Planned Interventions: self care training, functional mobility training, therapeutic exercise, balance training, therapeutic activities, endurance activities, patient education, home safety training, and family training/education    Frequency/Duration: Patient will be followed by occupational therapy 3 times a week to address goals. Recommendation for discharge: (in order for the patient to meet his/her long term goals)  No skilled occupational therapy/ follow up rehabilitation needs identified at this time. This discharge recommendation:  Has been made in collaboration with the attending provider and/or case management    IF patient discharges home will need the following DME: tbd       SUBJECTIVE:   Patient stated I get so short of breath just doing small things when my heart rate increases.     OBJECTIVE DATA SUMMARY:   HISTORY:   Past Medical History:   Diagnosis Date    Anemia     r/t chemo    Arthritis     BPH without obstruction/lower urinary tract symptoms 01/16/2018    Bronchogenic carcinoma of right lung ([facility] [address] 05/2022    bronchogenic carcinoma centered posteriorly in the right upper lobe obstructing the posterior segmental bronchus of the right upper lobe; Stage IIIB; systemic therapy consisting of carboplatinum, Abraxane and Keytruda    Diet-controlled diabetes mellitus ([address]     Diverticulosis     Drug-induced polyneuropathy ([address] 01/16/2018    Due to chemotherapy    GERD with stricture 01/16/2018    Status post dilatation    History of esophageal cancer 2008    tx esophagectomy, chemo, radiation (complete 2014)    History of gout     Hx of colonic polyps     Hyperkalemia 04/29/2019    Kidney cyst     Malignant pleural effusion     With indwelling plerex catheter    CHAVEZ (nonalcoholic steatohepatitis) 2000    \"fatty liver\" diet related    Personal history of colonic polyps 12/06/2012    Primary hypertension     Stage 3 chronic kidney disease ([facility] [address] 04/29/2019    Thrombocytopenia (HCC)     r/t chemo    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  7/2/2021         HX CATARACT REMOVAL Bilateral     HX CHOLECYSTECTOMY  9/29/11    HX ESOPHAGECTOMY  2008    HX HEMORRHOIDECTOMY      HX HERNIA REPAIR      HX ROTATOR CUFF REPAIR Right 2014    HX VASCULAR ACCESS Right 2008    port placed and removed in 2014    IR INSERT CATH PLEURAL INDWELL  5/24/2022    IR INSERT TUNL CVC W PORT OVER 5 YEARS  5/19/2022    IR REMOVE TUNL CVAD W PORT/PUMP  7/7/2022    NM COLSC FLX W/RMVL OF TUMOR POLYP LESION SNARE TQ  7/1/2010         NM EGD BALLOON DILATION ESOPHAGUS <30 MM DIAM  3/3/2011         NM EGD BALLOON DILATION ESOPHAGUS <30 MM DIAM  5/22/2012         NM EGD TRANSORAL BIOPSY SINGLE/MULTIPLE  3/29/2012         UPPER GI ENDOSCOPY,BALL DIL,30MM  1/11/2018         UPPER GI ENDOSCOPY,BALL DIL,30MM  1/31/2020            Expanded or extensive additional review of patient history:     Home Situation  Home Environment: Private residence  # Steps to Enter: 6  Rails to Enter: Yes  Hand Rails : Right  One/Two Story Residence: Split level  Living Alone: No  Support Systems: Spouse/Significant Other, Child(diane), Other Family Member(s)  Patient Expects to be Discharged to[de-identified] Home  Current DME Used/Available at Home: [employer], rollator, Shower chair, Grab bars    Hand dominance: Right    EXAMINATION OF PERFORMANCE DEFICITS:  Cognitive/Behavioral Status:  Neurologic State: Alert  Orientation Level: Oriented X4  Cognition: Follows commands  Perception: Appears intact  Perseveration: No perseveration noted  Safety/Judgement: Awareness of environment    Skin: in fair health     Edema: none     Hearing: Auditory  Auditory Impairment: None    Vision/Perceptual:                    intact                 Range of Motion:    AROM: Within functional limits                         Strength:    Strength: Within functional limits                Coordination:  Coordination: Within functional limits  Fine Motor Skills-Upper: Left Intact; Right Intact    Gross Motor Skills-Upper: Left Intact    Tone & Sensation:    Tone: Normal  Sensation: Impaired (Decreased sensation to light touch plantar surface bilat feet)                      Balance:  Sitting: Intact  Standing: Impaired  Standing - Static: Good;Constant support  Standing - Dynamic : Good;Constant support    Functional Mobility and Transfers for ADLs:  Bed Mobility:  Rolling: Modified independent  Supine to Sit: Modified independent;Bed Modified  Scooting: Independent    Transfers:  Sit to Stand: Supervision  Stand to Sit: Supervision  Bed to Chair: Stand-by assistance  Bathroom Mobility: Stand-by assistance  Toilet Transfer : Stand-by assistance    ADL Assessment:  Feeding: Independent    Oral Facial Hygiene/Grooming: Setup    Bathing: Minimum assistance;Contact guard assistance         Upper Body Dressing: Setup    Lower Body Dressing: Minimum assistance; Moderate assistance    Toileting: Supervision                 Cognitive Retraining  Safety/Judgement: Awareness of environment           Functional Measure:    Barthel Index:  Bathin  Bladder: 5  Bowels: 10  Groomin  Dressin  Feeding: 10  Mobility: 0  Stairs: 0  Toilet Use: 5  Transfer (Bed to Chair and Back): 10  Total: 50/100      The Barthel ADL Index: Guidelines  1. The index should be used as a record of what a patient does, not as a record of what a patient could do.   2. The main aim is to establish degree of independence from any help, physical or verbal, however minor and for whatever reason. 3. The need for supervision renders the patient not independent. 4. A patient's performance should be established using the best available evidence. Asking the patient, friends/relatives and nurses are the usual sources, but direct observation and common sense are also important. However direct testing is not needed. 5. Usually the patient's performance over the preceding 24-48 hours is important, but occasionally longer periods will be relevant. 6. Middle categories imply that the patient supplies over 50 per cent of the effort. 7. Use of aids to be independent is allowed. Score Interpretation (from [address] [facility] [address])    Independent   60-79 Minimally independent   40-59 Partially dependent   20-39 Very dependent   <20 Totally dependent     -Brigitte Tapia., Barthel, DIvonneW. (1965). Functional evaluation: the Barthel Index. [facility] ([address] (1997). The Barthel activities of daily living index: self-reporting versus actual performance in the old (> or = 75 years). Journal [address] 45(7), [facility], ELO, Jose A Dash., Jose M Vanessa. (1999). Measuring the change in disability after inpatient rehabilitation; comparison of the responsiveness of the Barthel Index and Functional [place] Measure. Journal of Neurology, Neurosurgery, and Psychiatry, 66(4), 339-772. Liz Vargas, NIvonneJ.A, ATIF Khan, & Hawk Santoro M.A. (2004) Assessment of post-stroke quality of life in cost-effectiveness studies: The usefulness of the Barthel Index and the EuroQoL-5D.  Quality of Life Research, 15, 811-56         Occupational Therapy Evaluation Charge Determination   History Examination Decision-Making   LOW Complexity : Brief history review  LOW Complexity : 1-3 performance deficits relating to physical, cognitive , or psychosocial skils that result in activity limitations and / or participation restrictions  LOW Complexity : No comorbidities that affect functional and no verbal or physical assistance needed to complete eval tasks       Based on the above components, the patient evaluation is determined to be of the following complexity level: LOW   Pain Rating:  none    Activity Tolerance:   Fair    After treatment patient left in no apparent distress:    Sitting in chair, Call bell within reach, Bed / chair alarm activated, and Caregiver / family present    COMMUNICATION/EDUCATION:   The patients plan of care was discussed with: Physical therapist and Registered nurse. Patient/family have participated as able in goal setting and plan of care. This patients plan of care is appropriate for delegation to [facility].     Thank you for this referral.  Alli Rose, OT  Time Calculation: 19 mins

## 2022-12-13 NOTE — CONSULTS
Palliative Medicine Consult  [place]: [phone] ([phone])    Patient Name: Lamine Obregon  YOB: 1940    Date of Initial Consult: 12/11/22  Reason for Consult: Care Decisions  Requesting Provider: Kolby Manjarrez MD   Primary Care Physician: Dom Collazo MD     SUMMARY:   Lamine Obregon is a 80 y.o. 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 PALLIATIVE DIAGNOSES:   Palliative Care  Anorexia  Physical Debility     PLAN:   Met with Mr Maritza Layne along with his wife, daughter and granddaughter who were at bedside. He looks good today! Off oxygen, had just gotten back into bed after being up in the chair, and is in good spirits  His wife is a little more anxious- she shared that she is the \"pusher\" and was asking my advice on what to push- specifically in regards to food and water. I encouraged her to let him dictate what he feels like drinking- as he is completely awake and alert and able to listen to his own body. I did encourage him to try and eat something small every few hours, as he has no appetite, but also told his wife not to worry so much about eating \"healthy\" as it is better for him to eat something that he likes, than nothing. I shared with them the purpose of palliative medicine and how we might help them on this journey- he seems to understand that his options are limited, but his wife is not there yet. She is VERY excited about \"targeted therapy\" and brain radiation.   Mr Maritza Layne himself said \"well we shall see what happens\"  I didn't talk about his current DNR status or complete a DDNR with him as I was not getting the impression that this conversation would be received well at this point, but this is something that can surely be re-addressed at a later date pending his treatment course  I would love to see him come to the office, as I think it will be important for him to make some decisions for his EOL himself to take some of this burden off of his wife. Will stay involved peripherally so that I can maybe talk to him in private about some things he should think about. In the meantime I did give his wife my card so that they can call to make an appointment if they choose- but I also asked Dr Herlinda Powell to refer if she thinks it would be helpful in the coming months  Initial consult note routed to primary continuity provider and/or primary health care team members  Communicated plan of care with: Palliative IDTKatie [facility] Team     GOALS OF CARE / TREATMENT PREFERENCES:     GOALS OF CARE:  Patient/Health Care Proxy Stated Goals: Prolong life    TREATMENT PREFERENCES:   Code Status: DNR    Advance Care Planning:  [x] [facility] Interdisciplinary Team has updated the ACP Navigator with [address] and Patient Capacity      [facility] [phone]    Medical Interventions: Limited additional interventions       Other:    As far as possible, the palliative care team has discussed with patient / health care proxy about goals of care / treatment preferences for patient.      HISTORY:     History obtained from: chart, patient    CHIEF COMPLAINT: none- feel well    HPI/SUBJECTIVE:    The patient is:   [x] Verbal and participatory  [] Non-participatory due to:      Clinical Pain Assessment (nonverbal scale for severity on nonverbal patients):   Clinical Pain Assessment  Severity: 0          Duration: for how long has pt been experiencing pain (e.g., 2 days, 1 month, years)  Frequency: how often pain is an issue (e.g., several times per day, once every few days, constant)     FUNCTIONAL ASSESSMENT:     Palliative Performance Scale (PPS):  PPS: 50       PSYCHOSOCIAL/SPIRITUAL SCREENING: Palliative IDT has assessed this patient for cultural preferences / practices and a referral made as appropriate to needs (Cultural Services, Patient Advocacy, Ethics, etc.)    Any spiritual / Sikhism concerns:  [] Yes /  [x] No   If \"Yes\" to discuss with pastoral care during IDT     Does caregiver feel burdened by caring for their loved one:   [] Yes /  [x] No /  [] No Caregiver Present/Available [] No Caregiver [] Pt Lives at [place] [address]  If \"Yes\" to discuss with social work during IDT    Anticipatory grief assessment:   [x] Normal  / [] Maladaptive     If \"Maladaptive\" to discuss with social work during IDT    ESAS Anxiety: Anxiety: 0    ESAS Depression: Depression: 0        REVIEW OF SYSTEMS:     Positive and pertinent negative findings in ROS are noted above in HPI. The following systems were [x] reviewed / [] unable to be reviewed as noted in HPI  Other findings are noted below. Systems: constitutional, ears/nose/mouth/throat, respiratory, gastrointestinal, genitourinary, musculoskeletal, integumentary, neurologic, psychiatric, endocrine. Positive findings noted below. Modified ESAS Completed by: provider   Fatigue: 3     Depression: 0 Pain: 0   Anxiety: 0 Nausea: 0   Anorexia: 6 Dyspnea: 0           Stool Occurrence(s): 1        PHYSICAL EXAM:     From RN flowsheet:  Wt Readings from Last 3 Encounters:   12/13/22 143 lb 1.6 oz (64.9 kg)   12/06/22 145 lb 6.4 oz (66 kg)   10/06/22 147 lb (66.7 kg)     Blood pressure 102/68, pulse 87, temperature 98.1 °F (36.7 °C), resp. rate 23, height 5' 9\" (1.753 m), weight 143 lb 1.6 oz (64.9 kg), SpO2 94 %.     Pain Scale 1: Numeric (0 - 10)  Pain Intensity 1: 0                 Last bowel movement, if known:     Constitutional: awake, alert, in good spirits, feeling well  Eyes: pupils equal, anicteric  ENMT: no nasal discharge, moist mucous membranes  Cardiovascular: regular rhythm, distal pulses intact  Respiratory: breathing not labored, symmetric  Gastrointestinal: soft non-tender, +bowel sounds  Musculoskeletal: no deformity, no tenderness to palpation  Skin: warm, dry  Neurologic: following commands, moving all extremities  Psychiatric: full affect, no hallucinations  Other:       HISTORY:     Active Problems:    Acute respiratory failure with hypoxia ([address] (12/11/2022)    Past Medical History:   Diagnosis Date    Anemia     r/t chemo    Arthritis     BPH without obstruction/lower urinary tract symptoms 01/16/2018    Bronchogenic carcinoma of right lung ([address] 05/2022    bronchogenic carcinoma centered posteriorly in the right upper lobe obstructing the posterior segmental bronchus of the right upper lobe; Stage IIIB; systemic therapy consisting of carboplatinum, Abraxane and Keytruda    Diet-controlled diabetes mellitus ([address]     Diverticulosis     Drug-induced polyneuropathy ([address] 01/16/2018    Due to chemotherapy    GERD with stricture 01/16/2018    Status post dilatation    History of esophageal cancer 2008    tx esophagectomy, chemo, radiation (complete 2014)    History of gout     Hx of colonic polyps     Hyperkalemia 04/29/2019    Kidney cyst     Malignant pleural effusion     With indwelling plerex catheter    CHAVEZ (nonalcoholic steatohepatitis) 2000    \"fatty liver\" diet related    Personal history of colonic polyps 12/06/2012    Primary hypertension     Stage 3 chronic kidney disease ([address] 04/29/2019    Thrombocytopenia ([address]     r/t chemo    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 01/16/2018    Vitamin D deficiency 01/16/2018      Past Surgical History:   Procedure Laterality Date    COLONOSCOPY N/A 6/23/2016    COLONOSCOPY performed by Amanda Abernathy MD at [facility] ENDOSCOPY    COLONOSCOPY N/A 7/2/2021    COLONOSCOPY performed by Amanda Tucker MD at [address] [place] [address]  7/2/2021         HX CATARACT REMOVAL Bilateral     HX CHOLECYSTECTOMY  9/29/11    HX ESOPHAGECTOMY  2008    HX HEMORRHOIDECTOMY      HX HERNIA REPAIR      HX ROTATOR CUFF REPAIR Right 2014    HX VASCULAR ACCESS Right     port placed and removed in 2014    IR INSERT CATH PLEURAL INDWELL  2022    IR INSERT TUNL CVC W PORT OVER 5 YEARS  2022    IR REMOVE TUNL CVAD W PORT/PUMP  2022    MO COLSC FLX W/RMVL OF TUMOR POLYP LESION SNARE TQ  2010         MO EGD BALLOON DILATION ESOPHAGUS <30 MM DIAM  3/3/2011         MO EGD BALLOON DILATION ESOPHAGUS <30 MM DIAM  2012         MO EGD TRANSORAL BIOPSY SINGLE/MULTIPLE  3/29/2012         UPPER GI ENDOSCOPY,BALL DIL,30MM  2018         UPPER GI ENDOSCOPY,BALL DIL,30MM  2020           Family History   Problem Relation Age of Onset    Diabetes Mother     OSTEOARTHRITIS Father     Cancer Sister         lung    Cancer Sister         breast      History reviewed, no pertinent family history.   Social History     Tobacco Use    Smoking status: Former     Packs/day: 2.00     Years: 25.00     Pack years: 50.00     Types: Cigarettes     Quit date: 1987     Years since quittin.9    Smokeless tobacco: Never   Substance Use Topics    Alcohol use: No     No Known Allergies   Current Facility-Administered Medications   Medication Dose Route Frequency    cefdinir (OMNICEF) capsule 300 mg  300 mg Oral Q12H    doxycycline (VIBRA-TABS) tablet 100 mg  100 mg Oral Q12H    metoprolol tartrate (LOPRESSOR) tablet 12.5 mg  12.5 mg Oral Q12H    potassium phosphate 15 mmol in 0.9% sodium chloride 250 mL infusion   IntraVENous ONCE    magnesium sulfate 1 g/100 ml IVPB (premix or compounded)  1 g IntraVENous ONCE    sodium chloride (NS) flush 5-40 mL  5-40 mL IntraVENous Q8H    sodium chloride (NS) flush 5-40 mL  5-40 mL IntraVENous PRN    acetaminophen (TYLENOL) tablet 650 mg  650 mg Oral Q6H PRN    Or    acetaminophen (TYLENOL) suppository 650 mg  650 mg Rectal Q6H PRN    polyethylene glycol (MIRALAX) packet 17 g  17 g Oral DAILY PRN    albuterol-ipratropium (DUO-NEB) 2.5 MG-0.5 MG/3 ML  3 mL Nebulization Q4H PRN pantoprazole (PROTONIX) 40 mg in 0.9% sodium chloride 10 mL injection  40 mg IntraVENous DAILY    alcohol 62% (NOZIN) nasal  1 Ampule  1 Ampule Topical Q12H    apixaban (ELIQUIS) tablet 10 mg  10 mg Oral BID    [START ON 12/18/2022] apixaban (ELIQUIS) tablet 5 mg  5 mg Oral BID    pyridoxine (vitamin B6) (VITAMIN B-6) tablet 50 mg  50 mg Oral DAILY    cyanocobalamin (VITAMIN B12) tablet 1,000 mcg  1,000 mcg Oral DAILY    insulin lispro (HUMALOG) injection   SubCUTAneous AC&HS    glucose chewable tablet 16 g  4 Tablet Oral PRN    glucagon (GLUCAGEN) injection 1 mg  1 mg IntraMUSCular PRN    dextrose 10% infusion 0-250 mL  0-250 mL IntraVENous PRN    balsam peru-castor oiL (VENELEX) ointment   Topical BID          LAB AND IMAGING FINDINGS:     Lab Results   Component Value Date/Time    WBC 13.5 (H) 12/13/2022 02:16 AM    HGB 12.0 (L) 12/13/2022 02:16 AM    PLATELET 269 30/21/0514 02:16 AM     Lab Results   Component Value Date/Time    Sodium 140 12/13/2022 02:16 AM    Potassium 4.1 12/13/2022 02:16 AM    Chloride 106 12/13/2022 02:16 AM    CO2 24 12/13/2022 02:16 AM    BUN 24 (H) 12/13/2022 02:16 AM    Creatinine 0.89 12/13/2022 02:16 AM    Calcium 9.2 12/13/2022 02:16 AM    Magnesium 1.6 12/13/2022 02:16 AM    Phosphorus 2.5 (L) 12/13/2022 02:16 AM      Lab Results   Component Value Date/Time    Alk.  phosphatase 103 12/13/2022 02:16 AM    Protein, total 6.6 12/13/2022 02:16 AM    Albumin 2.3 (L) 12/13/2022 02:16 AM    Globulin 4.3 (H) 12/13/2022 02:16 AM     Lab Results   Component Value Date/Time    INR 1.1 12/11/2022 04:22 AM    Prothrombin time 11.9 (H) 12/11/2022 04:22 AM    aPTT 28.1 10/03/2011 03:30 AM      No results found for: IRON, FE, TIBC, IBCT, PSAT, FERR   No results found for: PH, PCO2, PO2  No components found for: GLPOC   No results found for: CPK, CKMB             Total time: 50  Counseling / coordination time, spent as noted above: 30  > 50% counseling / coordination?: y    Prolonged service was provided for  []30 min   []75 min in face to face time in the presence of the patient, spent as noted above. Time Start:   Time End:   Note: this can only be billed with 92033 (initial) or [id number] (follow up). If multiple start / stop times, list each separately.

## 2022-12-13 NOTE — PROGRESS NOTES
Hospitalist Progress Note    NAME: Naomi Ny   :  1940   MRN:  [id number]       Assessment / Plan:    Acute hypoxic respiratory failure due to Pulmonary emboli + RLL PNA-improved  Left femoral DVT  -CTA chest done and showed acute pulmonary embolism in the left lower lobar and left upper lobe segmental pulmonary artery levels, increased size of right lung masses and right lower lobe pneumonia  -Off  Bipap and Off O2 now  -Procal Neg  -trop 179,   -covid and flu negative  -goal SPO2 92-97%  -Patient was started on Eliquis 10 mg twice daily x7 days, then transition to 5 mg twice daily  -Adjusted antibiotic therapy to Omnicef/Doxy, discontinue Vanco/cefepime  --duonebs Q 4 hour prn     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 intake  --We will start high-protein supplements    PT/OT    18.5 - 24.9 Normal weight / Body mass index is 21.13 kg/m². Code status: DNR  Prophylaxis: Eliquis  Recommended Disposition:  TBD   Anticipated Discharge Date:  1-2 days       Subjective:       Patient was seen and examined this morning. Patient denies any new complaints. Shortness of breath has improved significantly.   Patient is currently on room air patient denies fever, chills, shortness of breath, chest pain, abdominal pain, nausea, vomiting, and diarrhea. No overnight events reported by nursing staff. iew of Systems:  Symptom Y/N Comments  Symptom Y/N Comments   Fever/Chills    Chest Pain     Poor Appetite    Edema     Cough    Abdominal Pain     Sputum    Joint Pain     SOB/GREGG    Pruritis/Rash     Nausea/vomit    Tolerating PT/OT     Diarrhea    Tolerating Diet     Constipation    Other       PO intake: No data found. Wt Readings from Last 10 Encounters:   12/13/22 64.9 kg (143 lb 1.6 oz)   12/06/22 66 kg (145 lb 6.4 oz)   10/06/22 66.7 kg (147 lb)   09/30/22 66.7 kg (147 lb)   09/19/22 66.9 kg (147 lb 7.8 oz)   09/02/22 66.3 kg (146 lb 3.2 oz)   07/07/22 67.1 kg (148 lb)   06/28/22 67.1 kg (148 lb)   05/24/22 63.5 kg (140 lb)   05/19/22 67.1 kg (148 lb)       Objective:     VITALS:   Last 24hrs VS reviewed since prior progress note.  Most recent are:  Patient Vitals for the past 24 hrs:   Temp Pulse Resp BP SpO2   12/13/22 0735 97.6 °F (36.4 °C) 89 18 126/79 93 %   12/13/22 0225 98.1 °F (36.7 °C) (!) 116 20 112/82 96 %   12/13/22 0210 -- (!) 127 29 -- 96 %   12/12/22 2231 98 °F (36.7 °C) 87 20 109/83 94 %   12/12/22 2051 97.3 °F (36.3 °C) 91 20 116/82 93 %   12/12/22 1526 98.2 °F (36.8 °C) 81 17 125/71 --   12/12/22 1500 -- 81 17 125/71 94 %   12/12/22 1400 -- 79 13 107/73 96 %   12/12/22 1300 -- (!) 117 24 102/67 93 %   12/12/22 1200 98.2 °F (36.8 °C) 83 16 118/73 94 %   12/12/22 1157 98.2 °F (36.8 °C) -- -- -- --   12/12/22 1150 -- -- -- -- 94 %   12/12/22 1100 -- 86 19 94/79 93 %   12/12/22 1000 -- 90 19 108/64 93 %   12/12/22 0900 -- 75 19 125/66 98 %         Intake/Output Summary (Last 24 hours) at 12/13/2022 0802  Last data filed at 12/13/2022 0504  Gross per 24 hour   Intake 200 ml   Output 1110 ml   Net -910 ml          I had a face to face encounter, and independently examined this patient as outlined below:    PHYSICAL EXAM:  General:    No distress     HEENT: Atraumatic, anicteric sclerae, pink conjunctivae, MMM  Neck:  Supple, symmetrical  Lungs:   CTA. No Wheezing/Rhonchi. No rales. No tenderness. No Accessory muscle use. CVS:   Regular rhythm. No murmur. No JVD   GI/:   Soft. NT. ND. BS normal  Extremities: No edema. No cyanosis. No clubbing. Skin:     Not pale. Not Jaundiced. No rashes   Psych:  Good insight. Not depressed. Not anxious or agitated. Neurologic: Alert and oriented X 4. EOMs intact. No facial asymmetry. No slurred speech. Symmetrical strength, Sensation grossly intact. Labs     I reviewed today's most current labs and imaging studies. Pertinent labs include:  Recent Labs     12/13/22 0216 12/12/22 0327 12/11/22 0422   WBC 13.5* 18.3* 16.0*   HGB 12.0* 10.0* 12.2   HCT 36.5* 30.8* 36.9    276 313       Recent Labs     12/13/22  0216 12/12/22  0327 12/11/22  0422 12/10/22  2237    139 135* 136   K 4.1 4.4 4.1 4.5    107 102 102   CO2 24 26 25 28   * 116* 204* 122*   BUN 24* 21* 20 22*   CREA 0.89 0.93 1.13 0.96   CA 9.2 9.1 9.4 9.3   MG 1.6 1.8 1.8  --    PHOS 2.5* 3.1 2.7  --    ALB 2.3*  --  2.3* 2.4*   TBILI 0.3  --  0.5 0.3   ALT 35  --  20 20   INR  --   --  1.1  --        CTA CHEST W OR W WO CONT    Result Date: 12/11/2022  1. Left upper and lower lobe segmental and lobar pulmonary embolism. 2.  No right-sided pulmonary embolism. 3.  Increased size of right perihilar lung mass. 4.  Small right pleural effusion with pleural drainage catheter in place. 5.  Heterogeneous attenuation of the right lower lobe suggestive of pneumonia. XR CHEST PORT    Result Date: 12/10/2022  Increased small to moderate right pleural effusion and right basilar opacity that may represent atelectasis, edema, or infection. Right chest tube in place. DUPLEX LOWER EXT VENOUS BILAT    Result Date: 12/11/2022  : Left femoral DVT. No right DVT. No results found.      All Micro Results       Procedure Component Value Units Date/Time    CULTURE, BLOOD [[id number]] Collected: 12/10/22 2211    Order Status: Completed Specimen: Blood Updated: 12/12/22 0757     Special Requests: NO SPECIAL REQUESTS        Culture result: NO GROWTH 2 DAYS       CULTURE, BLOOD [[id number]] Collected: 12/10/22 2237    Order Status: Completed Specimen: Blood Updated: 12/12/22 0757     Special Requests: NO SPECIAL REQUESTS        Culture result: NO GROWTH 2 DAYS       CULTURE, BODY FLUID Reece Payment [[id number]] Collected: 12/11/22 0159    Order Status: Completed Specimen: Body Fluid from Pleural Fluid Updated: 12/12/22 0624     Special Requests: NO SPECIAL REQUESTS        GRAM STAIN RARE WBCS SEEN        Culture result:       NO GROWTH THUS FAR Culture performed on Fluid swab specimen          COVID-19 RAPID TEST [[id number]] Collected: 12/11/22 0203    Order Status: Completed Specimen: Nasopharyngeal Updated: 12/11/22 0225     Specimen source Nasopharyngeal        COVID-19 rapid test Not detected        Comment: Rapid Abbott ID Now       Rapid NAAT:  The specimen is NEGATIVE for SARS-CoV-2, the novel coronavirus associated with COVID-19. Negative results should be treated as presumptive and, if inconsistent with clinical signs and symptoms or necessary for patient management, should be tested with an alternative molecular assay. Negative results do not preclude SARS-CoV-2 infection and should not be used as the sole basis for patient management decisions. This test has been authorized by the FDA under an Emergency Use Authorization (EUA) for use by authorized laboratories.    Fact sheet for Healthcare Providers:  http://www.blayne.nabor/  Fact sheet for Patients: http://www.dale-elizabeth.nabor/       Methodology: Isothermal Nucleic Acid Amplification                   Current Medications:     Current Facility-Administered Medications:     sodium chloride (NS) flush 5-40 mL, 5-40 mL, IntraVENous, Q8H, Jack Romero, NP, 10 mL at 12/13/22 6622 sodium chloride (NS) flush 5-40 mL, 5-40 mL, IntraVENous, PRN, Deonna Aguilera NP    acetaminophen (TYLENOL) tablet 650 mg, 650 mg, Oral, Q6H PRN **OR** acetaminophen (TYLENOL) suppository 650 mg, 650 mg, Rectal, Q6H PRN, Deonna Aguilera NP    polyethylene glycol (MIRALAX) packet 17 g, 17 g, Oral, DAILY PRN, Deonna Aguilera NP    azithromycin (ZITHROMAX) 500 mg in 0.9% sodium chloride 250 mL ([id number]), 500 mg, IntraVENous, Q24H, Gris Ellison MD, Last Rate: 250 mL/hr at 12/12/22 2238, 500 mg at 12/12/22 2238    albuterol-ipratropium (DUO-NEB) 2.5 MG-0.5 MG/3 ML, 3 mL, Nebulization, Q4H PRN, Deonna Aguilera NP    pantoprazole (PROTONIX) 40 mg in 0.9% sodium chloride 10 mL injection, 40 mg, IntraVENous, DAILY, Deonna Aguilera NP, 40 mg at 12/12/22 0914    vancomycin (VANCOCIN) 1,000 mg in 0.9% sodium chloride 250 mL ([id number]), 1,000 mg, IntraVENous, Q24H, Lisa Roberto MD, Last Rate: 250 mL/hr at 12/13/22 0501, 1,000 mg at 12/13/22 0501    cefepime (MAXIPIME) 2 g in 0.9% sodium chloride (MBP/ADV) 100 mL MBP, 2 g, IntraVENous, Q12H, Gris Ellison MD, Last Rate: 25 mL/hr at 12/13/22 0254, 2 g at 12/13/22 0254    alcohol 62% (NOZIN) nasal  1 Ampule, 1 Ampule, Topical, Q12H, Gris Ellison MD, 1 Ampule at 12/12/22 2118    apixaban (ELIQUIS) tablet 10 mg, 10 mg, Oral, BID, Lisa Roberto MD, 10 mg at 12/12/22 2114    [START ON 12/18/2022] apixaban (ELIQUIS) tablet 5 mg, 5 mg, Oral, BID, Lisa Roberto MD    pyridoxine (vitamin B6) (VITAMIN B-6) tablet 50 mg, 50 mg, Oral, DAILY, Oswaldo Chirinos MD, 50 mg at 12/12/22 0913    cyanocobalamin (VITAMIN B12) tablet 1,000 mcg, 1,000 mcg, Oral, DAILY, Blessing Chirinos MD, 1,000 mcg at 12/12/22 0913    insulin lispro (HUMALOG) injection, , SubCUTAneous, AC&HS, Blessing Chirinos MD    glucose chewable tablet 16 g, 4 Tablet, Oral, PRN, Blessing Chirinos MD    glucagon (GLUCAGEN) injection 1 mg, 1 mg, IntraMUSCular, PRN, Oswaldo Chirinos MD    dextrose 10% infusion 0-250 mL, 0-250 mL, IntraVENous, PRN, Edinson Chirinos MD    balsam peru-castor oiL (VENELEX) ointment, , Topical, BID, Edinson Chirinos MD, Given at 12/12/22 5676     Procedures: see electronic medical records for all procedures/Xrays and details which were not copied into this note but were reviewed prior to creation of Plan. Reviewed most current lab test results and cultures  YES  Reviewed most current radiology test results   YES  Review and summation of old records today    NO  Reviewed patient's current orders and MAR    YES  PMH/ reviewed - no change compared to H&P  ________________________________________________________________________  Care Plan discussed with:    Comments   Patient x    Family  x    RN     Care Manager     Consultant                        Multidiciplinary team rounds were held today with , nursing, pharmacist and clinical coordinator. Patient's plan of care was discussed; medications were reviewed and discharge planning was addressed.      ________________________________________________________________________  Total NON critical care TIME:  35  Minutes    Total CRITICAL CARE TIME Spent:   Minutes non procedure based      Comments   >50% of visit spent in counseling and coordination of care x     This includes time during multidisciplinary rounds if indicated above   ________________________________________________________________________  Wing Toni DO

## 2022-12-13 NOTE — PROGRESS NOTES
0700: Bedside shift change report given to Aranza Corea (oncoming nurse) by Saurabh Rizo (offgoing nurse). Report included the following information SBAR and Kardex. 1050: Pt worked with PT/OT; up to chair. End of Shift Note    Bedside shift change report given to Saurabh Rizo (oncoming nurse) by Arleth Moore (offgoing nurse). Report included the following information SBAR and Kardex    Shift worked:  day     Shift summary and any significant changes:     3g of Mag and IV K    Poor oral intake       Concerns for physician to address:       Zone phone for oncoming shift:          Activity:  Activity Level: Up with Assistance  Number times ambulated in hallways past shift: 0  Number of times OOB to chair past shift: 1    Cardiac:   Cardiac Monitoring: Yes      Cardiac Rhythm: Sinus Rhythm    Access:  Current line(s): PIV     Genitourinary:   Urinary status: voiding    Respiratory:   O2 Device: None (Room air)  Chronic home O2 use?: NO  Incentive spirometer at bedside: YES       GI:  Last Bowel Movement Date: 12/13/22  Current diet:  ADULT DIET Regular  ADULT ORAL NUTRITION SUPPLEMENT Breakfast, Lunch, Dinner; Standard High Calorie/High Protein  Passing flatus: YES  Tolerating current diet: YES       Pain Management:   Patient states pain is manageable on current regimen: YES    Skin:  Wesley Score: 19  Interventions: increase time out of bed, PT/OT consult, and internal/external urinary devices    Patient Safety:  Fall Score:  Total Score: 3  Interventions: bed/chair alarm, assistive device (walker, cane, etc), gripper socks, pt to call before getting OOB, and stay with me (per policy)  High Fall Risk: Yes    Length of Stay:  Expected LOS: 4d 2h  Actual LOS: 2      Arleth Moore

## 2022-12-13 NOTE — PROGRESS NOTES
End of Shift Note    Bedside shift change report given to Beverley Del Valle (oncoming nurse) by Autumn Sales RN (offgoing nurse). Report included the following information SBAR, Kardex, and MAR    Shift worked:  nights     Shift summary and any significant changes:     No acute changed. VSS. No complaints of pain. Wife stayed overnight. , no insulin. Around 0200, got pt up to [facility] to have BM, pt's HR got into 140's-150's and was feeling SOB tripoding on RA. 2L NC applied. Pt's HR sustained 120's well after being in bed. BM was large and soft. Would recommend bedpan due to pt's HR having a hard recovering   Concerns for physician to address:  -     Zone phone for oncoming shift:   -       Activity:  Activity Level: Up with Assistance  Number times ambulated in hallways past shift: 0  Number of times OOB to chair past shift: 0    Cardiac:   Cardiac Monitoring: Yes      Cardiac Rhythm: Sinus Rhythm    Access:  Current line(s): PIV     Genitourinary:   Urinary status: voiding    Respiratory:   O2 Device: None (Room air)  Chronic home O2 use?: NO  Incentive spirometer at bedside: NO       GI:  Last Bowel Movement Date: 12/12/22  Current diet:  ADULT DIET Regular  Passing flatus: YES  Tolerating current diet: YES       Pain Management:   Patient states pain is manageable on current regimen: YES    Skin:  Wesley Score: 17  Interventions: float heels, increase time out of bed, PT/OT consult, and limit briefs    Patient Safety:  Fall Score:  Total Score: 2  Interventions: bed/chair alarm, gripper socks, pt to call before getting OOB, and stay with me (per policy)       Length of Stay:  Expected LOS: - - -  Actual LOS: 1      Autumn Sales, RN

## 2022-12-13 NOTE — PROGRESS NOTES
Problem: Pressure Injury - Risk of  Goal: *Prevention of pressure injury  Description: Document Wesley Scale and appropriate interventions in the flowsheet. Outcome: Progressing Towards Goal  Note: Pressure Injury Interventions: Activity Interventions: Assess need for specialty bed, Increase time out of bed, PT/OT evaluation, Pressure redistribution bed/mattress(bed type)    Mobility Interventions: Assess need for specialty bed, HOB 30 degrees or less, PT/OT evaluation, Turn and reposition approx. every two hours(pillow and wedges)    Nutrition Interventions: Document food/fluid/supplement intake    Friction and Shear Interventions: Apply protective barrier, creams and emollients, HOB 30 degrees or less, Lift team/patient mobility team, Minimize layers                Problem: Patient Education: Go to Patient Education Activity  Goal: Patient/Family Education  Outcome: Progressing Towards Goal     Problem: Falls - Risk of  Goal: *Absence of Falls  Description: Document Rohini Fall Risk and appropriate interventions in the flowsheet.   Outcome: Progressing Towards Goal  Note: Fall Risk Interventions:  Mobility Interventions: Assess mobility with egress test, Bed/chair exit alarm, Patient to call before getting OOB, PT Consult for mobility concerns, PT Consult for assist device competence, Strengthening exercises (ROM-active/passive)    Mentation Interventions: Bed/chair exit alarm, Adequate sleep, hydration, pain control    Medication Interventions: Assess postural VS orthostatic hypotension, Bed/chair exit alarm, Patient to call before getting OOB, Teach patient to arise slowly         History of Falls Interventions: Bed/chair exit alarm

## 2022-12-13 NOTE — PROGRESS NOTES
Problem: Mobility Impaired (Adult and Pediatric)  Goal: *Acute Goals and Plan of Care (Insert Text)  Description: FUNCTIONAL STATUS PRIOR TO ADMISSION: Patient was independent with use of SPC, recently purchased rollator to start using but hasn't set it up yet. Wife is present when he showers and will assist with washing back. HOME SUPPORT PRIOR TO ADMISSION: The patient lived with spouse who provides assist as needed, has very supportive and involved family. Physical Therapy Goals  Initiated 12/13/2022  1. Patient will move from supine to sit and sit to supine , scoot up and down, and roll side to side in bed with independence within 7 day(s). 2.  Patient will transfer from bed to chair and chair to bed with modified independence using the least restrictive device within 7 day(s). 3.  Patient will perform sit to stand with modified independence within 7 day(s). 4.  Patient will ambulate with modified independence for 75 feet with the least restrictive device within 7 day(s). 5.  Patient will ascend/descend 6 stairs with single handrail(s) with supervision/set-up within 7 day(s). Outcome: Not Met     PHYSICAL THERAPY EVALUATION  Patient: Naomi Ny (76 y.o. male)  Date: 12/13/2022  Primary Diagnosis: Acute respiratory failure with hypoxia (HCC) [J96.01]       Precautions:  Fall    ASSESSMENT  Based on the objective data described below, the patient presents with dyspnea on exertion, high HR with minimal activity, impaired standing balance, and decreased endurance following admission for acute PE in LLL, L femoral DVT, and RLL PNA. PmHx significant for lung cancer with mets to brain and has pleural catheter that is drained 3 days/week. Closely monitored vitals during session, BP remaining stable with positioning changes.  At rest, HR at 100bpm and with standing & ambulation max HR observed at 125bpm. Upon resting, HR quickly recovers to 115bpm and after 2 min seated rest break <110bpm. Patient completes bed mobility with Mod Indep using railing, sit<>stand with supervision, bed<>chair with RW CGA, and ambulation x30ft with RW CGA. Patient very SOB following static standing bout and ambulation trial, cuing for PLB. Plan to trial rollator at next session to see if this helps with patient SOB during mobility. Patient is near baseline status though is limited by his elevated HR and SOB. Patient will require skilled PT services to address above impairments and improve overall mobility status. Recommend HH at discharge especially if patient continues to have limited endurance, he would likely be able to return home with just family support as well. Current Level of Function Impacting Discharge (mobility/balance): Bed mobility with Mod Indep using railing, sit<>stand with supervision, bed<>chair with RW CGA, and ambulation x30ft with RW CGA    Functional Outcome Measure: The patient scored 50/100 on the Barthel Index outcome measure which is indicative of moderately impaired functional ability. Other factors to consider for discharge: supportive family     Patient will benefit from skilled therapy intervention to address the above noted impairments. PLAN :  Recommendations and Planned Interventions: bed mobility training, transfer training, gait training, therapeutic exercises, neuromuscular re-education, patient and family training/education, and therapeutic activities      Frequency/Duration: Patient will be followed by physical therapy:  3 times a week to address goals.     Recommendation for discharge: (in order for the patient to meet his/her long term goals)  Physical therapy at least 2 days/week in the home     This discharge recommendation:  A follow-up discussion with the attending provider and/or case management is planned    IF patient discharges home will need the following DME: patient owns DME required for discharge     SUBJECTIVE:   Patient stated I would love to get out of bed.    OBJECTIVE DATA SUMMARY:   HISTORY:    Past Medical History:   Diagnosis Date    Anemia     r/t chemo    Arthritis     BPH without obstruction/lower urinary tract symptoms 01/16/2018    Bronchogenic carcinoma of right lung ([address] 05/2022    bronchogenic carcinoma centered posteriorly in the right upper lobe obstructing the posterior segmental bronchus of the right upper lobe; Stage IIIB; systemic therapy consisting of carboplatinum, Abraxane and Keytruda    Diet-controlled diabetes mellitus ([address]     Diverticulosis     Drug-induced polyneuropathy ([address] 01/16/2018    Due to chemotherapy    GERD with stricture 01/16/2018    Status post dilatation    History of esophageal cancer 2008    tx esophagectomy, chemo, radiation (complete 2014)    History of gout     Hx of colonic polyps     Hyperkalemia 04/29/2019    Kidney cyst     Malignant pleural effusion     With indwelling plerex catheter    CHAVEZ (nonalcoholic steatohepatitis) 2000    \"fatty liver\" diet related    Personal history of colonic polyps 12/06/2012    Primary hypertension     Stage 3 chronic kidney disease ([address] 04/29/2019    Thrombocytopenia ([address]     r/t chemo    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 01/16/2018    Vitamin D deficiency 01/16/2018     Past Surgical History:   Procedure Laterality Date    COLONOSCOPY N/A 6/23/2016    COLONOSCOPY performed by Sylvia Painting MD at [facility]    COLONOSCOPY N/A 7/2/2021    COLONOSCOPY performed by Christopher Olivo MD at [address] [place] [address]  7/2/2021         HX CATARACT REMOVAL Bilateral     HX CHOLECYSTECTOMY  9/29/11    HX ESOPHAGECTOMY  2008    HX HEMORRHOIDECTOMY      HX HERNIA REPAIR      HX ROTATOR CUFF REPAIR Right 2014    HX VASCULAR ACCESS Right 2008    port placed and removed in 2014    IR INSERT CATH PLEURAL INDWELL  5/24/2022    IR INSERT TUNL CVC W PORT OVER 5 YEARS  5/19/2022    IR REMOVE TUNL CVAD W PORT/PUMP  7/7/2022    IN COLSC FLX W/RMVL OF TUMOR POLYP LESION SNARE TQ  7/1/2010         WA EGD BALLOON DILATION ESOPHAGUS <30 MM DIAM  3/3/2011         WA EGD BALLOON DILATION ESOPHAGUS <30 MM DIAM  5/22/2012         [place] EGD TRANSORAL BIOPSY SINGLE/MULTIPLE  3/29/2012         UPPER GI ENDOSCOPY,BALL DIL,30MM  1/11/2018         UPPER GI ENDOSCOPY,BALL DIL,30MM  1/31/2020          Personal factors and/or comorbidities impacting plan of care: Lung cancer with brain mets, Chemo induced neuropathy, CKD    Home Situation  Home Environment: Private residence  # Steps to Enter: 6  Rails to Enter: Yes  Hand Rails : Right  One/Two Story Residence: Split level  Living Alone: No  Support Systems: Spouse/Significant Other, Child(diane), Other Family Member(s)  Patient Expects to be Discharged to[de-identified] Home  Current DME Used/Available at Home: [employer] Hawks, rollator, Shower chair, Grab bars    EXAMINATION/PRESENTATION/DECISION MAKING:   Critical Behavior:  Neurologic State: Alert  Orientation Level: Oriented X4  Cognition: Appropriate decision making, Follows commands     Hearing: Auditory  Auditory Impairment: None    Range Of Motion:  AROM: Within functional limits  Strength:    Strength:  Within functional limits  Tone & Sensation:   Tone: Normal  Sensation: Impaired (Decreased sensation to light touch plantar surface bilat feet)  Coordination:  Coordination: Within functional limits    Functional Mobility:  Bed Mobility:  Rolling: Modified independent  Supine to Sit: Modified independent;Bed Modified  Scooting: Independent  Transfers:  Sit to Stand: Supervision  Stand to Sit: Supervision  Balance:   Sitting: Intact  Standing: Impaired  Standing - Static: Good;Constant support  Standing - Dynamic : Good;Constant support  Ambulation/Gait Training:  Distance (ft): 30 Feet (ft)  Assistive Device: Walker, rolling;Gait belt  Ambulation - Level of Assistance: Contact guard assistance  Gait Abnormalities: Decreased step clearance  Speed/Carol: Pace decreased (<100 feet/min)    Functional Measure:  Barthel Index:    Bathin  Bladder: 5  Bowels: 10  Groomin  Dressin  Feeding: 10  Mobility: 0  Stairs: 0  Toilet Use: 5  Transfer (Bed to Chair and Back): 10  Total: 50/100       The Barthel ADL Index: Guidelines  1. The index should be used as a record of what a patient does, not as a record of what a patient could do. 2. The main aim is to establish degree of independence from any help, physical or verbal, however minor and for whatever reason. 3. The need for supervision renders the patient not independent. 4. A patient's performance should be established using the best available evidence. Asking the patient, friends/relatives and nurses are the usual sources, but direct observation and common sense are also important. However direct testing is not needed. 5. Usually the patient's performance over the preceding 24-48 hours is important, but occasionally longer periods will be relevant. 6. Middle categories imply that the patient supplies over 50 per cent of the effort. 7. Use of aids to be independent is allowed. Jazzmine Little., Barthel, D.W. (3887). Functional evaluation: the Barthel Index. [facility] (14)2. Celina Husbands delma ELO Parish, [facility] [place], [address] [place] [address] (). Measuring the change indisability after inpatient rehabilitation; comparison of the responsiveness of the Barthel Index and Functional [place] Measure. Journal of Neurology, Neurosurgery, and Psychiatry, 66(4), 134-377. Sugey Dockery, N.J.A, ATIF Khan, & Di Wyman M.A. (2004.) Assessment of post-stroke quality of life in cost-effectiveness studies: The usefulness of the Barthel Index and the EuroQoL-5D.  Quality of Life Research, 15, 180-05      Physical Therapy Evaluation Charge Determination   History Examination Presentation Decision-Making   HIGH Complexity :3+ comorbidities / personal factors will impact the outcome/ POC  MEDIUM Complexity : 3 Standardized tests and measures addressing body structure, function, activity limitation and / or participation in recreation  MEDIUM Complexity : Evolving with changing characteristics  Other outcome measures Barthel Index 50/100  MEDIUM      Based on the above components, the patient evaluation is determined to be of the following complexity level: MEDIUM    Activity Tolerance:   Fair, SpO2 stable on RA, requires frequent rest breaks, and observed SOB with activity    After treatment patient left in no apparent distress:   Sitting in chair, Call bell within reach, Bed / chair alarm activated, and Caregiver / family present    COMMUNICATION/EDUCATION:   The patients plan of care was discussed with: Occupational therapist and Registered nurse. Fall prevention education was provided and the patient/caregiver indicated understanding. and Patient/family have participated as able in goal setting and plan of care.     Thank you for this referral.  Amie Jeff, PT   Time Calculation: 19 mins

## 2022-12-13 NOTE — PROGRESS NOTES
Transition of Care Plan:    RUR:15%  Disposition:Reason for Admission:   Respiratory Distress                           PCP: First and Last name:   Dom Collazo MD     Name of Practice:    Are you a current patient: Yes/No: yes   Approximate date of last visit:    Can you participate in a virtual visit if needed:     Do you (patient/family) have any concerns for transition/discharge? PT recommending [facility]                 Plan for utilizing home health:   yes    Current Advanced Directive/Advance Care Plan:  DNR      Healthcare Decision Maker:   Click here to complete [address] including selection of the Healthcare Decision Maker Relationship (ie \"Primary\")              Transition of Care Plan:          If SNF or IPR: Date FOC offered:na  Date FOC received:na  Date authorization started with reference number:na  Date authorization received and expires:na  Accepting facility:na  Follow up appointments:PCP Specialist  DME needed: has walker  Transportation at Discharge:transportation to be decided. Wife may transport  [place] or means to access home:    family    IM Medicare Letter:2nd  letter to be signed. Is patient a [place] and connected with the [place]?   no             If yes, was [employer] transfer form completed and VA notified? Caregiver Contact:Mary Kay Emmanuel (Spouse) [phone] (  Discharge Caregiver contacted prior to discharge? Care Conference needed?:    no          Care Management Interventions  Support Systems: Spouse/Significant Other, Child(diane), Other Family Member(s)  Discharge Location  Patient Expects to be Discharged to[de-identified] Home    CM met with patient and wife , introduced self, explained role and offered assistance. Patient has been ambulatory but might need more help at GA. He does drive still. He saw PCP in December. Pharmacy is [facility] on [address] He lives with wife. PT has recommended HH and wife has chosen multiple [facility] to place referrals to.  CM will continue to follow up with DC planning.     [address] [place] [address]  [phone]

## 2022-12-13 NOTE — PROGRESS NOTES
-Hematology / Oncology ([facility]) -  -Primary Oncologist- Dr. Acacia Mcpherson  -CC-\"I am ok I think\"    -S-  Wife states his HR went up when he got up to go to bathroom last night, feeling better this am.    -O-    Patient Vitals for the past 24 hrs:   Temp Pulse Resp BP SpO2   12/13/22 0735 97.6 °F (36.4 °C) 89 18 126/79 93 %   12/13/22 0225 98.1 °F (36.7 °C) (!) 116 20 112/82 96 %   12/13/22 0210 -- (!) 127 29 -- 96 %   12/12/22 2231 98 °F (36.7 °C) 87 20 109/83 94 %   12/12/22 2051 97.3 °F (36.3 °C) 91 20 116/82 93 %   12/12/22 1526 98.2 °F (36.8 °C) 81 17 125/71 --   12/12/22 1500 -- 81 17 125/71 94 %   12/12/22 1400 -- 79 13 107/73 96 %   12/12/22 1300 -- (!) 117 24 102/67 93 %   12/12/22 1200 98.2 °F (36.8 °C) 83 16 118/73 94 %   12/12/22 1157 98.2 °F (36.8 °C) -- -- -- --   12/12/22 1150 -- -- -- -- 94 %   12/12/22 1100 -- 86 19 94/79 93 %   12/12/22 1000 -- 90 19 108/64 93 %     No intake/output data recorded. Gen: nad  Chest: bilateral breath sounds present  Cardiac: rrr  Abd: s/nt    -Labs-    Recent Labs     12/13/22  0216 12/12/22  0327 12/11/22  0422 12/10/22  2237   WBC 13.5* 18.3* 16.0* 12.0*   HGB 12.0* 10.0* 12.2 12.6    276 313 280   ANEU 10.2* 15.9* 15.4* 9.1*   INR  --   --  1.1  --     139 135* 136   K 4.1 4.4 4.1 4.5   * 116* 204* 122*   BUN 24* 21* 20 22*   CREA 0.89 0.93 1.13 0.96   ALT 35  --  20 20   TBILI 0.3  --  0.5 0.3     --  109 115   CA 9.2 9.1 9.4 9.3   MG 1.6 1.8 1.8  --    PHOS 2.5* 3.1 2.7  --        -Imaging-   IMPRESSION  brain metastases    -Assessment + Plan-      *) NSCLC, Adenocarcinoma with progression and new brain mets:  - Reviewed his brain mri that shows 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  Discussed options of hospice and wife seems very resistant stating \"We have to let him live as long as possible\"  - He is not a candidate for systemic chemotherapy but he does have KRAS mutated lung cancer so may be candidate for oral sotorasib. - Outpt Radiation referral since family does not want hospice at this time. *) Acute PE and DVT:  - On Eliquis , will need lifelong AC     *) PNA:  - per primary     *) Malignant pleural effusion:  - Has Aspira and drains 3x per week.

## 2022-12-14 ENCOUNTER — APPOINTMENT (OUTPATIENT)
Dept: NON INVASIVE DIAGNOSTICS | Age: 82
DRG: 175 | End: 2022-12-14
Attending: NURSE PRACTITIONER
Payer: MEDICARE

## 2022-12-14 LAB
ALBUMIN SERPL-MCNC: 2.1 G/DL (ref 3.5–5)
ALBUMIN/GLOB SERPL: 0.5 {RATIO} (ref 1.1–2.2)
ALP SERPL-CCNC: 99 U/L (ref 45–117)
ALT SERPL-CCNC: 34 U/L (ref 12–78)
ANION GAP SERPL CALC-SCNC: 8 MMOL/L (ref 5–15)
AST SERPL-CCNC: 38 U/L (ref 15–37)
BASOPHILS # BLD: 0 K/UL (ref 0–0.1)
BASOPHILS NFR BLD: 0 % (ref 0–1)
BILIRUB SERPL-MCNC: 0.4 MG/DL (ref 0.2–1)
BUN SERPL-MCNC: 24 MG/DL (ref 6–20)
BUN/CREAT SERPL: 27 (ref 12–20)
CALCIUM SERPL-MCNC: 8.9 MG/DL (ref 8.5–10.1)
CHLORIDE SERPL-SCNC: 103 MMOL/L (ref 97–108)
CO2 SERPL-SCNC: 25 MMOL/L (ref 21–32)
CREAT SERPL-MCNC: 0.88 MG/DL (ref 0.7–1.3)
DIFFERENTIAL METHOD BLD: ABNORMAL
EOSINOPHIL # BLD: 0.1 K/UL (ref 0–0.4)
EOSINOPHIL NFR BLD: 1 % (ref 0–7)
ERYTHROCYTE [DISTWIDTH] IN BLOOD BY AUTOMATED COUNT: 15.3 % (ref 11.5–14.5)
GLOBULIN SER CALC-MCNC: 4.1 G/DL (ref 2–4)
GLUCOSE BLD STRIP.AUTO-MCNC: 101 MG/DL (ref 65–117)
GLUCOSE BLD STRIP.AUTO-MCNC: 149 MG/DL (ref 65–117)
GLUCOSE BLD STRIP.AUTO-MCNC: 95 MG/DL (ref 65–117)
GLUCOSE BLD STRIP.AUTO-MCNC: 98 MG/DL (ref 65–117)
GLUCOSE SERPL-MCNC: 96 MG/DL (ref 65–100)
HCT VFR BLD AUTO: 34.5 % (ref 36.6–50.3)
HGB BLD-MCNC: 11.3 G/DL (ref 12.1–17)
IMM GRANULOCYTES # BLD AUTO: 0.1 K/UL (ref 0–0.04)
IMM GRANULOCYTES NFR BLD AUTO: 0 % (ref 0–0.5)
LYMPHOCYTES # BLD: 1.3 K/UL (ref 0.8–3.5)
LYMPHOCYTES NFR BLD: 10 % (ref 12–49)
MAGNESIUM SERPL-MCNC: 2.2 MG/DL (ref 1.6–2.4)
MCH RBC QN AUTO: 26.9 PG (ref 26–34)
MCHC RBC AUTO-ENTMCNC: 32.8 G/DL (ref 30–36.5)
MCV RBC AUTO: 82.1 FL (ref 80–99)
MONOCYTES # BLD: 1.2 K/UL (ref 0–1)
MONOCYTES NFR BLD: 9 % (ref 5–13)
NEUTS SEG # BLD: 10.9 K/UL (ref 1.8–8)
NEUTS SEG NFR BLD: 80 % (ref 32–75)
NRBC # BLD: 0 K/UL (ref 0–0.01)
NRBC BLD-RTO: 0 PER 100 WBC
PHOSPHATE SERPL-MCNC: 3.1 MG/DL (ref 2.6–4.7)
PLATELET # BLD AUTO: 297 K/UL (ref 150–400)
PMV BLD AUTO: 9.8 FL (ref 8.9–12.9)
POTASSIUM SERPL-SCNC: 4.1 MMOL/L (ref 3.5–5.1)
PROT SERPL-MCNC: 6.2 G/DL (ref 6.4–8.2)
RBC # BLD AUTO: 4.2 M/UL (ref 4.1–5.7)
SERVICE CMNT-IMP: ABNORMAL
SERVICE CMNT-IMP: NORMAL
SODIUM SERPL-SCNC: 136 MMOL/L (ref 136–145)
WBC # BLD AUTO: 13.6 K/UL (ref 4.1–11.1)

## 2022-12-14 PROCEDURE — C9113 INJ PANTOPRAZOLE SODIUM, VIA: HCPCS | Performed by: NURSE PRACTITIONER

## 2022-12-14 PROCEDURE — 93306 TTE W/DOPPLER COMPLETE: CPT

## 2022-12-14 PROCEDURE — 74011250637 HC RX REV CODE- 250/637: Performed by: STUDENT IN AN ORGANIZED HEALTH CARE EDUCATION/TRAINING PROGRAM

## 2022-12-14 PROCEDURE — 84100 ASSAY OF PHOSPHORUS: CPT

## 2022-12-14 PROCEDURE — 74011250636 HC RX REV CODE- 250/636: Performed by: NURSE PRACTITIONER

## 2022-12-14 PROCEDURE — 74011250637 HC RX REV CODE- 250/637: Performed by: NURSE PRACTITIONER

## 2022-12-14 PROCEDURE — 65660000001 HC RM ICU INTERMED STEPDOWN

## 2022-12-14 PROCEDURE — 74011250637 HC RX REV CODE- 250/637: Performed by: INTERNAL MEDICINE

## 2022-12-14 PROCEDURE — 74011250637 HC RX REV CODE- 250/637: Performed by: GENERAL ACUTE CARE HOSPITAL

## 2022-12-14 PROCEDURE — 82962 GLUCOSE BLOOD TEST: CPT

## 2022-12-14 PROCEDURE — 80053 COMPREHEN METABOLIC PANEL: CPT

## 2022-12-14 PROCEDURE — 94640 AIRWAY INHALATION TREATMENT: CPT

## 2022-12-14 PROCEDURE — 36415 COLL VENOUS BLD VENIPUNCTURE: CPT

## 2022-12-14 PROCEDURE — 85025 COMPLETE CBC W/AUTO DIFF WBC: CPT

## 2022-12-14 PROCEDURE — 83735 ASSAY OF MAGNESIUM: CPT

## 2022-12-14 PROCEDURE — 74011000250 HC RX REV CODE- 250: Performed by: NURSE PRACTITIONER

## 2022-12-14 RX ORDER — GUAIFENESIN 1200 MG/1
1200 TABLET, EXTENDED RELEASE ORAL 2 TIMES DAILY
Qty: 12 TABLET | Refills: 0 | Status: SHIPPED | OUTPATIENT
Start: 2022-12-14

## 2022-12-14 RX ORDER — ALBUTEROL SULFATE 90 UG/1
1 AEROSOL, METERED RESPIRATORY (INHALATION)
Qty: 18 G | Refills: 2 | Status: SHIPPED | OUTPATIENT
Start: 2022-12-14

## 2022-12-14 RX ORDER — BENZONATATE 200 MG/1
200 CAPSULE ORAL
Qty: 15 CAPSULE | Refills: 0 | Status: SHIPPED | OUTPATIENT
Start: 2022-12-14 | End: 2022-12-21

## 2022-12-14 RX ORDER — METOPROLOL TARTRATE 25 MG/1
25 TABLET, FILM COATED ORAL EVERY 12 HOURS
Status: DISCONTINUED | OUTPATIENT
Start: 2022-12-14 | End: 2022-12-15 | Stop reason: HOSPADM

## 2022-12-14 RX ORDER — GUAIFENESIN 100 MG/5ML
200 SOLUTION ORAL
Status: DISCONTINUED | OUTPATIENT
Start: 2022-12-14 | End: 2022-12-15 | Stop reason: HOSPADM

## 2022-12-14 RX ORDER — METOPROLOL TARTRATE 25 MG/1
25 TABLET, FILM COATED ORAL EVERY 12 HOURS
Qty: 60 TABLET | Refills: 0 | Status: SHIPPED | OUTPATIENT
Start: 2022-12-14 | End: 2022-12-22 | Stop reason: SDUPTHER

## 2022-12-14 RX ORDER — CEFDINIR 300 MG/1
300 CAPSULE ORAL EVERY 12 HOURS
Qty: 3 CAPSULE | Refills: 0 | Status: SHIPPED | OUTPATIENT
Start: 2022-12-14 | End: 2022-12-16

## 2022-12-14 RX ORDER — DOXYCYCLINE HYCLATE 100 MG
100 TABLET ORAL EVERY 12 HOURS
Qty: 3 TABLET | Refills: 0 | Status: SHIPPED | OUTPATIENT
Start: 2022-12-14 | End: 2022-12-16

## 2022-12-14 RX ADMIN — IPRATROPIUM BROMIDE AND ALBUTEROL SULFATE 3 ML: .5; 3 SOLUTION RESPIRATORY (INHALATION) at 00:58

## 2022-12-14 RX ADMIN — SODIUM CHLORIDE, PRESERVATIVE FREE 10 ML: 5 INJECTION INTRAVENOUS at 21:37

## 2022-12-14 RX ADMIN — CEFDINIR 300 MG: 300 CAPSULE ORAL at 09:21

## 2022-12-14 RX ADMIN — CASTOR OIL AND BALSAM, PERU: 788; 87 OINTMENT TOPICAL at 09:29

## 2022-12-14 RX ADMIN — METOPROLOL TARTRATE 25 MG: 25 TABLET, FILM COATED ORAL at 21:31

## 2022-12-14 RX ADMIN — Medication 1 AMPULE: at 09:00

## 2022-12-14 RX ADMIN — METOPROLOL TARTRATE 25 MG: 25 TABLET, FILM COATED ORAL at 09:25

## 2022-12-14 RX ADMIN — SODIUM CHLORIDE, PRESERVATIVE FREE 10 ML: 5 INJECTION INTRAVENOUS at 05:40

## 2022-12-14 RX ADMIN — APIXABAN 10 MG: 5 TABLET, FILM COATED ORAL at 21:31

## 2022-12-14 RX ADMIN — Medication 1 AMPULE: at 21:32

## 2022-12-14 RX ADMIN — GUAIFENESIN 200 MG: 200 SOLUTION ORAL at 00:43

## 2022-12-14 RX ADMIN — CEFDINIR 300 MG: 300 CAPSULE ORAL at 21:31

## 2022-12-14 RX ADMIN — DOXYCYCLINE HYCLATE 100 MG: 100 TABLET, COATED ORAL at 21:31

## 2022-12-14 RX ADMIN — PYRIDOXINE HCL TAB 50 MG 50 MG: 50 TAB at 09:21

## 2022-12-14 RX ADMIN — SODIUM CHLORIDE, PRESERVATIVE FREE 10 ML: 5 INJECTION INTRAVENOUS at 17:41

## 2022-12-14 RX ADMIN — APIXABAN 10 MG: 5 TABLET, FILM COATED ORAL at 09:21

## 2022-12-14 RX ADMIN — Medication 1000 MCG: at 09:21

## 2022-12-14 RX ADMIN — CASTOR OIL AND BALSAM, PERU: 788; 87 OINTMENT TOPICAL at 21:32

## 2022-12-14 RX ADMIN — SODIUM CHLORIDE, PRESERVATIVE FREE 40 MG: 5 INJECTION INTRAVENOUS at 09:21

## 2022-12-14 RX ADMIN — DOXYCYCLINE HYCLATE 100 MG: 100 TABLET, COATED ORAL at 09:21

## 2022-12-14 NOTE — PROGRESS NOTES
Pulse oximetry assessment   94% at rest on room air (if 88% or less, skip next steps)  91% while ambulating on room air    % at rest on LPM  % while ambulating on LPM

## 2022-12-14 NOTE — PROGRESS NOTES
End of Shift Note    Bedside shift change report given to Parley Hatchet (oncoming nurse) by David Flanagan RN (offgoing nurse). Report included the following information SBAR, Kardex, and MAR    Shift worked:  nights     Shift summary and any significant changes:     -BG 98, no insulin  -Perfect Served Dr. Alejandro Waldrop, pt complaining of dry cough. PRN Q4 5 mL Robitussin added and given   -L IV leaking, dressing changed  -Pt's lung sounds are coarse with audible expiratory wheezing. Called respiratory for PRN Duoneb. Duoneb given. Concerns for physician to address:  -     Zone phone for oncoming shift:   -       Activity:  Activity Level: Up with Assistance  Number times ambulated in hallways past shift: 0  Number of times OOB to chair past shift: 0    Cardiac:   Cardiac Monitoring: Yes      Cardiac Rhythm: Sinus Rhythm    Access:  Current line(s): PIV     Genitourinary:   Urinary status: voiding    Respiratory:   O2 Device: None (Room air)  Chronic home O2 use?: NO  Incentive spirometer at bedside: NO       GI:  Last Bowel Movement Date: 12/13/22  Current diet:  ADULT DIET Regular  ADULT ORAL NUTRITION SUPPLEMENT Breakfast, Lunch, Dinner; Standard High Calorie/High Protein  Passing flatus: YES  Tolerating current diet: YES       Pain Management:   Patient states pain is manageable on current regimen: YES    Skin:  Wesley Score: 19  Interventions: float heels, increase time out of bed, PT/OT consult, and nutritional support     Patient Safety:  Fall Score:  Total Score: 3  Interventions: bed/chair alarm, gripper socks, pt to call before getting OOB, and stay with me (per policy)  High Fall Risk: Yes    Length of Stay:  Expected LOS: 4d 2h  Actual LOS: 2      David Flanagan RN

## 2022-12-14 NOTE — DISCHARGE SUMMARY
Hospitalist Discharge Summary     Patient ID:  Dorinda Reyes  [id number]  89 y.o.  1940  12/10/2022    PCP on record: Hailee Hill MD    Admit date: 12/10/2022  Discharge date and time: 12/14/2022    DISCHARGE DIAGNOSIS:    -Acute hypoxic respiratory failure secondary to PE/pneumonia  -Community-acquired pneumonia  -Pulmonary embolism/DVT  -Lung adenocarcinoma with malignant pleural effusion and now mets to the brain      CONSULTATIONS:  IP CONSULT TO PALLIATIVE CARE - PROVIDER  IP CONSULT TO PALLIATIVE CARE - PROVIDER  IP CONSULT TO ONCOLOGY    Excerpted HPI from H&P of Birdie Burden MD:    80year old M pt with pmh of esophageal cancer s/p radiation and chemotherapy 14 years ago now with lung cancer with metastasis to the brain, followed at [facility] oncology and on palliative chemotherapy consisting of carboplatinum, Abraxane, and Keytruda, presents to [facility] for cc of SOB. Upon arrival to [facility] pt SPO2 88% on 6 L NC with a respiratory rate in the 40s. Placed on Bipap with some improvement in WOB. CTA chest done and showed Acute pulmonary embolism in the left lower lobar and left upper lobe segmental pulmonary artery levels, Increased size of right lung masses and Right lower lobe pneumonia. Given therapeutic Lovenox for PE and ceftriaxone and azithromycin for CAP. ICU consulted for admission. Upon my arrival pt breathing more comfortably on Bipap. -120s ST.  BP stable. Will be admitted to the ICU for further management.  ______________________________________________________________________  DISCHARGE SUMMARY/HOSPITAL COURSE:  for full details see H&P, daily progress notes, labs, consult notes. Patient was admitted for further work-up and treatment. He was found to have PE/pneumonia and was started on treatment. He required to be in the ICU and required to be on BiPAP with improvement and was eventually weaned off oxygen completely.   The patient was found to have mets to the brain and was evaluated by oncology and he will follow-up with them as outpatient. He was started on Eliquis. He responded well to treatment. He is to follow-up closely as outpatient. The treatment plan was discussed at length with the patient and his spouse prior to discharge and they are agreeable with the treatment plan and will follow up as outpatient.        _______________________________________________________________________  Patient seen and examined by me on discharge day. Pertinent Findings:  Gen:    Not in distress  Chest: Clear lungs  CVS:   Regular rhythm. No edema  Abd:  Soft, not distended, not tender  Neuro:  Alert,   _______________________________________________________________________  DISCHARGE MEDICATIONS:   Current Discharge Medication List        START taking these medications    Details   !! apixaban (ELIQUIS) 5 mg tablet Take 2 Tablets by mouth two (2) times a day for 4 doses. Qty: 7 Tablet, Refills: 0  Start date: 12/14/2022, End date: 12/16/2022    Comments: 10 mg twice daily for 7 doses. Should take 5 mg twice daily thereafter. !! apixaban (ELIQUIS) 5 mg tablet Take 1 Tablet by mouth two (2) times a day. Qty: 60 Tablet, Refills: 3  Start date: 12/18/2022    Comments: To be started after finishing loading dose with 10 mg twice daily for 7 doses      cefdinir (OMNICEF) 300 mg capsule Take 1 Capsule by mouth every twelve (12) hours for 3 doses. Qty: 3 Capsule, Refills: 0  Start date: 12/14/2022, End date: 12/16/2022      doxycycline (VIBRA-TABS) 100 mg tablet Take 1 Tablet by mouth every twelve (12) hours for 3 doses. Qty: 3 Tablet, Refills: 0  Start date: 12/14/2022, End date: 12/16/2022      metoprolol tartrate (LOPRESSOR) 25 mg tablet Take 1 Tablet by mouth every twelve (12) hours. Qty: 60 Tablet, Refills: 0  Start date: 12/14/2022      guaiFENesin (Mucinex) 1,200 mg Ta12 ER tablet Take 1 Tablet by mouth two (2) times a day.   Qty: 12 Tablet, Refills: 0  Start date: 12/14/2022      benzonatate (TESSALON) 200 mg capsule Take 1 Capsule by mouth three (3) times daily as needed for Cough for up to 7 days. Qty: 15 Capsule, Refills: 0  Start date: 12/14/2022, End date: 12/21/2022      albuterol (Ventolin HFA) 90 mcg/actuation inhaler Take 1 Puff by inhalation every six (6) hours as needed for Wheezing, Shortness of Breath or Respiratory Distress. Qty: 18 g, Refills: 2  Start date: 12/14/2022       !! - Potential duplicate medications found. Please discuss with provider. CONTINUE these medications which have NOT CHANGED    Details   Hydromet 5-1.5 mg/5 mL oral solution       methylphenidate HCl (RITALIN) 5 mg tablet       mirtazapine (REMERON) 30 mg tablet Start date: 12/10/2022      HYDROcodone-acetaminophen (NORCO) 5-325 mg per tablet       pembrolizumab (KEYTRUDA IV) Every 3 weeks      carbamide peroxide (DEBROX) 6.5 % otic solution Administer 5 Drops into each ear two (2) times a day. Qty: 30 mL, Refills: 1    Associated Diagnoses: Impacted cerumen of left ear      cholecalciferol, vitamin D3, 50 mcg (2,000 unit) tab Take 1 Tab by mouth daily. esomeprazole (NEXIUM) 40 mg capsule TAKE 1 CAPSULE DAILY  Qty: 90 Cap, Refills: 1      CYANOCOBALAMIN (VITAMIN B-12 PO) Take 1,000 mg by mouth daily. pyridoxine, vitamin B6, (VITAMIN B-6) 100 mg tablet Take 100 mg by mouth daily. MULTIVITAMINS (MULTIVITAMIN PO) Take 1 Tablet by mouth daily. STOP taking these medications       sildenafil citrate (VIAGRA) 50 mg tablet Comments:   Reason for Stopping:                 Patient Follow Up Instructions:    Activity: Activity as tolerated  Diet: Resume previous diet    Follow-up Information       Follow up With Specialties Details Why Contact Info    Alma Kulkarni MD Hematology and Oncology Follow up Friday 12/16/22 at 1:30pm [address] [facility]  [address]  [address] [facility]      Arnol Orellana MD Internal Medicine Physician   Orien Olszewski [address] [facility] [address]  [phone]            ________________________________________________________________    Risk of deterioration: Moderate    Condition at Discharge:  Stable  __________________________________________________________________    Disposition  Home with family and home health services    ____________________________________________________________________    Code Status: DNR/DNI  ___________________________________________________________________      Total time in minutes spent coordinating this discharge (includes going over instructions, follow-up, prescriptions, and preparing report for sign off to her PCP) :  >30 minutes    Signed:   Krystal Mc DO

## 2022-12-14 NOTE — PROGRESS NOTES
Transition of Care Plan:     RUR:16%  Disposition:Reason for Admission:   Respiratory Distress                            PCP:    First and Last name:   Roverto Heaton MD                 Name of Practice:               Are you a current patient: Yes/No: yes              Approximate date of last visit:               Can you participate in a virtual visit if needed:      Do you (patient/family) have any concerns for transition/discharge? PT recommending [facility]                 Plan for utilizing home health:   yes     Current Advanced Directive/Advance Care Plan:  DNR        Healthcare Decision Maker:   Click here to complete [address] including selection of the Healthcare Decision Maker Relationship (ie \"Primary\")    CM note: [facility] referrals sent to [facility] (if [address], might be able to take patient)                                                  [facility]                                                  [facility] ([address]) [address] [facility] does not serve this area                                                  [employer] (called, will call back)                                                  [employer] ([facility]) FAX # [phone] need to send referral, face sheet, orders, progress notes, insurance.     [address] [place] [address]

## 2022-12-14 NOTE — PROGRESS NOTES
Problem: Pressure Injury - Risk of  Goal: *Prevention of pressure injury  Description: Document Wesley Scale and appropriate interventions in the flowsheet. Outcome: Resolved/Met  Note: Pressure Injury Interventions: Activity Interventions: Increase time out of bed, PT/OT evaluation, Pressure redistribution bed/mattress(bed type)    Mobility Interventions: HOB 30 degrees or less, Pressure redistribution bed/mattress (bed type), Turn and reposition approx.  every two hours(pillow and wedges), PT/OT evaluation    Nutrition Interventions: Offer support with meals,snacks and hydration, Document food/fluid/supplement intake    Friction and Shear Interventions: Apply protective barrier, creams and emollients, Feet elevated on foot rest, HOB 30 degrees or less, Lift sheet, Lift team/patient mobility team, Minimize layers

## 2022-12-14 NOTE — PROGRESS NOTES
Problem: Pressure Injury - Risk of  Goal: *Prevention of pressure injury  Description: Document Wesley Scale and appropriate interventions in the flowsheet. Outcome: Progressing Towards Goal  Note: Pressure Injury Interventions: Activity Interventions: Assess need for specialty bed, PT/OT evaluation, Increase time out of bed    Mobility Interventions: Assess need for specialty bed, HOB 30 degrees or less, PT/OT evaluation, Turn and reposition approx. every two hours(pillow and wedges)    Nutrition Interventions: Document food/fluid/supplement intake    Friction and Shear Interventions: Apply protective barrier, creams and emollients, HOB 30 degrees or less, Transferring/repositioning devices                Problem: Patient Education: Go to Patient Education Activity  Goal: Patient/Family Education  Outcome: Progressing Towards Goal     Problem: Falls - Risk of  Goal: *Absence of Falls  Description: Document Rohini Fall Risk and appropriate interventions in the flowsheet.   Outcome: Progressing Towards Goal  Note: Fall Risk Interventions:  Mobility Interventions: Assess mobility with egress test, Bed/chair exit alarm, PT Consult for mobility concerns, PT Consult for assist device competence, Strengthening exercises (ROM-active/passive)    Mentation Interventions: Bed/chair exit alarm, Adequate sleep, hydration, pain control    Medication Interventions: Assess postural VS orthostatic hypotension, Bed/chair exit alarm, Patient to call before getting OOB, Teach patient to arise slowly    Elimination Interventions: Bed/chair exit alarm, Call light in reach, Urinal in reach, Stay With Me (per policy), Toilet paper/wipes in reach    History of Falls Interventions: Bed/chair exit alarm         Problem: Patient Education: Go to Patient Education Activity  Goal: Patient/Family Education  Outcome: Progressing Towards Goal

## 2022-12-14 NOTE — PROGRESS NOTES
Transition of Care Plan:     RUR:16%  Disposition:Reason for Admission:   Respiratory Distress                            PCP:    First and Last name:   Jose Suazo MD                 Name of Practice:               Are you a current patient: Yes/No: yes              Approximate date of last visit:               Can you participate in a virtual visit if needed:      Do you (patient/family) have any concerns for transition/discharge? PT recommending [facility]                 Plan for utilizing home health:   yes     Current Advanced Directive/Advance Care Plan:  DNR        Healthcare Decision Maker:   Click here to complete [address] including selection of the Healthcare Decision Maker Relationship (ie \"Primary\")   Update: Spoke with family in room and updated them on progress of [facility]. Still waiting to hear from companies. Family understands DC will not be tonight. If patient has OP Radiation therapy, family will be able to transport him to radiation therapy. CM note: [facility] referrals sent to [facility] (if [address], might be able to take patient)                                                  [facility] (still reviewing 4:11pm)                                                  [facility] [facility] (did not accept)                                                  [facility] does not serve this area                                                  [employer] (called, will call back)                                                  [facility] [employer] ([employer]) FAX # [phone] need to send referral, face sheet, orders, progress notes, insurance.  (Information sent to [employer] this afternoon)

## 2022-12-14 NOTE — PROGRESS NOTES
-Hematology / Oncology ([facility]) -  -Primary Oncologist- Dr. Arsenio Longo  -CC-\"I am ok\"    -S-  No complaints this am    -O-    Patient Vitals for the past 24 hrs:   Temp Pulse Resp BP SpO2   12/14/22 0925 -- (!) 132 -- -- --   12/14/22 0724 98.2 °F (36.8 °C) 80 18 119/80 94 %   12/14/22 0238 98.1 °F (36.7 °C) 86 17 (!) 124/96 92 %   12/14/22 0100 -- -- -- -- 95 %   12/13/22 2333 98.2 °F (36.8 °C) 86 23 113/74 94 %   12/13/22 2151 -- (!) 110 -- 115/75 --   12/13/22 1933 97.6 °F (36.4 °C) 93 20 118/87 94 %   12/13/22 1449 98.1 °F (36.7 °C) 87 23 102/68 94 %   12/13/22 1146 -- 98 24 -- 95 %   12/13/22 1046 -- (!) 123 27 105/84 --   12/13/22 1045 97.7 °F (36.5 °C) (!) 125 25 105/84 93 %   12/13/22 1040 -- (!) 107 -- 121/89 --   12/13/22 1035 -- 100 -- 117/84 93 %     No intake/output data recorded. 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 Discussed options of hospice and wife seems very resistant stating \"We have to let him live as long as possible\"  - He is not a candidate for systemic chemotherapy but he does have KRAS mutated lung cancer so may be candidate for oral sotorasib. - Outpt radiation referral since family does not want hospice at this time.   - Will also see if he is willing to FU with Palliative care as oupt  - ok to dc today FU in clinic this Friday at 1:30pm     *) Acute PE and DVT:  - On Eliquis , will need lifelong AC     *) PNA:  - per primary     *) Malignant pleural effusion:  - Has Aspira and drains 3x per week.

## 2022-12-14 NOTE — PROGRESS NOTES
0700: Bedside shift change report given to Ike Steiner (oncoming nurse) by Jonel Deleon (offgoing nurse). Report included the following information SBAR and Kardex. End of Shift Note    Bedside shift change report given to Julia Antonio 44 (oncoming nurse) by Jacqueline Guzmán (offgoing nurse). Report included the following information SBAR and Kardex    Shift worked:  day     Shift summary and any significant changes:     HR more controlled with Metoprolol    DC pending HH setup    Wife drained Aspira today, 75ml     Concerns for physician to address:       Zone phone for oncoming shift:          Activity:  Activity Level: Up with Assistance  Number times ambulated in hallways past shift: 0  Number of times OOB to chair past shift: 2    Cardiac:   Cardiac Monitoring: Yes      Cardiac Rhythm: Sinus Rhythm    Access:  Current line(s): PIV     Genitourinary:   Urinary status: voiding    Respiratory:   O2 Device: None (Room air)  Chronic home O2 use?: NO  Incentive spirometer at bedside: YES       GI:  Last Bowel Movement Date: 12/13/22  Current diet:  ADULT DIET Regular  ADULT ORAL NUTRITION SUPPLEMENT Breakfast, Lunch, Dinner; Standard High Calorie/High Protein  Passing flatus: YES  Tolerating current diet: YES       Pain Management:   Patient states pain is manageable on current regimen: YES    Skin:  Wesley Score: 18  Interventions: increase time out of bed, PT/OT consult, limit briefs, and nutritional support     Patient Safety:  Fall Score:  Total Score: 3  Interventions: bed/chair alarm, assistive device (walker, cane, etc), gripper socks, pt to call before getting OOB, and stay with me (per policy)  High Fall Risk: Yes    Length of Stay:  Expected LOS: 4d 2h  Actual LOS: 3      Jacqueline Guzmán

## 2022-12-14 NOTE — PROGRESS NOTES
Received notification from bedside RN about patient with regards to: cough, requesting medication for relief  VS:  /74, HR 86, RR 23, O2 sat 94% on RA    Intervention given: Robitussin PO PRN ordered

## 2022-12-14 NOTE — DISCHARGE INSTRUCTIONS
It is critical that you follow up with your physicians on an outpatient basis. PLEASE TAKE YOUR MEDICATIONS AND FOLLOW UP FOR ANY LABS AS INDICATED ON THIS DISCHARGE NOTE. Please return to the local emergency room if your symptoms worsen. Please continue to take Eliquis 10 mg twice daily for 7 doses to complete your 7-day course of the loading dose. Then, please start taking Eliquis 5 mg twice daily    Please contact your PCP if you have any questions or concerns. Thank you, it was a pleasure taking care of you.

## 2022-12-15 VITALS
TEMPERATURE: 98.4 F | DIASTOLIC BLOOD PRESSURE: 73 MMHG | HEIGHT: 69 IN | BODY MASS INDEX: 21.18 KG/M2 | WEIGHT: 143 LBS | SYSTOLIC BLOOD PRESSURE: 103 MMHG | HEART RATE: 73 BPM | OXYGEN SATURATION: 95 % | RESPIRATION RATE: 18 BRPM

## 2022-12-15 LAB
ALBUMIN SERPL-MCNC: 2.2 G/DL (ref 3.5–5)
ALBUMIN/GLOB SERPL: 0.5 {RATIO} (ref 1.1–2.2)
ALP SERPL-CCNC: 97 U/L (ref 45–117)
ALT SERPL-CCNC: 28 U/L (ref 12–78)
ANION GAP SERPL CALC-SCNC: 7 MMOL/L (ref 5–15)
AST SERPL-CCNC: 30 U/L (ref 15–37)
BACTERIA SPEC CULT: NORMAL
BACTERIA SPEC CULT: NORMAL
BASOPHILS # BLD: 0 K/UL (ref 0–0.1)
BASOPHILS NFR BLD: 0 % (ref 0–1)
BILIRUB SERPL-MCNC: 0.4 MG/DL (ref 0.2–1)
BUN SERPL-MCNC: 23 MG/DL (ref 6–20)
BUN/CREAT SERPL: 27 (ref 12–20)
CALCIUM SERPL-MCNC: 9.7 MG/DL (ref 8.5–10.1)
CHLORIDE SERPL-SCNC: 104 MMOL/L (ref 97–108)
CO2 SERPL-SCNC: 27 MMOL/L (ref 21–32)
CREAT SERPL-MCNC: 0.85 MG/DL (ref 0.7–1.3)
DIFFERENTIAL METHOD BLD: ABNORMAL
ECHO EST RA PRESSURE: 10 MMHG
ECHO LA DIAMETER INDEX: 1.51 CM/M2
ECHO LA DIAMETER: 2.7 CM
ECHO LV E' LATERAL VELOCITY: 6 CM/S
ECHO LV E' SEPTAL VELOCITY: 7 CM/S
ECHO LV FRACTIONAL SHORTENING: 16 % (ref 28–44)
ECHO LV INTERNAL DIMENSION DIASTOLE INDEX: 2.12 CM/M2
ECHO LV INTERNAL DIMENSION DIASTOLIC: 3.8 CM (ref 4.2–5.9)
ECHO LV INTERNAL DIMENSION SYSTOLIC INDEX: 1.79 CM/M2
ECHO LV INTERNAL DIMENSION SYSTOLIC: 3.2 CM
ECHO LV IVSD: 0.8 CM (ref 0.6–1)
ECHO LV MASS 2D: 93.4 G (ref 88–224)
ECHO LV MASS INDEX 2D: 52.2 G/M2 (ref 49–115)
ECHO LV POSTERIOR WALL DIASTOLIC: 0.9 CM (ref 0.6–1)
ECHO LV RELATIVE WALL THICKNESS RATIO: 0.47
ECHO LVOT AREA: 3.8 CM2
ECHO LVOT DIAM: 2.2 CM
ECHO RIGHT VENTRICULAR SYSTOLIC PRESSURE (RVSP): 36 MMHG
ECHO RV INTERNAL DIMENSION: 4 CM
ECHO TV REGURGITANT MAX VELOCITY: 2.55 M/S
ECHO TV REGURGITANT PEAK GRADIENT: 26 MMHG
EOSINOPHIL # BLD: 0.2 K/UL (ref 0–0.4)
EOSINOPHIL NFR BLD: 1 % (ref 0–7)
ERYTHROCYTE [DISTWIDTH] IN BLOOD BY AUTOMATED COUNT: 15.4 % (ref 11.5–14.5)
GLOBULIN SER CALC-MCNC: 4.2 G/DL (ref 2–4)
GLUCOSE BLD STRIP.AUTO-MCNC: 89 MG/DL (ref 65–117)
GLUCOSE BLD STRIP.AUTO-MCNC: 93 MG/DL (ref 65–117)
GLUCOSE SERPL-MCNC: 105 MG/DL (ref 65–100)
GRAM STN SPEC: NORMAL
HCT VFR BLD AUTO: 35.6 % (ref 36.6–50.3)
HGB BLD-MCNC: 11.9 G/DL (ref 12.1–17)
IMM GRANULOCYTES # BLD AUTO: 0 K/UL (ref 0–0.04)
IMM GRANULOCYTES NFR BLD AUTO: 0 % (ref 0–0.5)
LYMPHOCYTES # BLD: 1.6 K/UL (ref 0.8–3.5)
LYMPHOCYTES NFR BLD: 12 % (ref 12–49)
MAGNESIUM SERPL-MCNC: 1.8 MG/DL (ref 1.6–2.4)
MCH RBC QN AUTO: 27.2 PG (ref 26–34)
MCHC RBC AUTO-ENTMCNC: 33.4 G/DL (ref 30–36.5)
MCV RBC AUTO: 81.5 FL (ref 80–99)
MONOCYTES # BLD: 1.3 K/UL (ref 0–1)
MONOCYTES NFR BLD: 10 % (ref 5–13)
NEUTS SEG # BLD: 10.1 K/UL (ref 1.8–8)
NEUTS SEG NFR BLD: 77 % (ref 32–75)
NRBC # BLD: 0 K/UL (ref 0–0.01)
NRBC BLD-RTO: 0 PER 100 WBC
PHOSPHATE SERPL-MCNC: 2.9 MG/DL (ref 2.6–4.7)
PLATELET # BLD AUTO: 292 K/UL (ref 150–400)
PMV BLD AUTO: 9.4 FL (ref 8.9–12.9)
POTASSIUM SERPL-SCNC: 4.5 MMOL/L (ref 3.5–5.1)
PROT SERPL-MCNC: 6.4 G/DL (ref 6.4–8.2)
RBC # BLD AUTO: 4.37 M/UL (ref 4.1–5.7)
SERVICE CMNT-IMP: NORMAL
SODIUM SERPL-SCNC: 138 MMOL/L (ref 136–145)
WBC # BLD AUTO: 13.3 K/UL (ref 4.1–11.1)

## 2022-12-15 PROCEDURE — 74011250637 HC RX REV CODE- 250/637: Performed by: NURSE PRACTITIONER

## 2022-12-15 PROCEDURE — 74011000250 HC RX REV CODE- 250: Performed by: NURSE PRACTITIONER

## 2022-12-15 PROCEDURE — 74011250637 HC RX REV CODE- 250/637: Performed by: STUDENT IN AN ORGANIZED HEALTH CARE EDUCATION/TRAINING PROGRAM

## 2022-12-15 PROCEDURE — 85025 COMPLETE CBC W/AUTO DIFF WBC: CPT

## 2022-12-15 PROCEDURE — 36415 COLL VENOUS BLD VENIPUNCTURE: CPT

## 2022-12-15 PROCEDURE — 74011250637 HC RX REV CODE- 250/637: Performed by: INTERNAL MEDICINE

## 2022-12-15 PROCEDURE — 74011250636 HC RX REV CODE- 250/636: Performed by: NURSE PRACTITIONER

## 2022-12-15 PROCEDURE — 83735 ASSAY OF MAGNESIUM: CPT

## 2022-12-15 PROCEDURE — 80053 COMPREHEN METABOLIC PANEL: CPT

## 2022-12-15 PROCEDURE — 82962 GLUCOSE BLOOD TEST: CPT

## 2022-12-15 PROCEDURE — 84100 ASSAY OF PHOSPHORUS: CPT

## 2022-12-15 PROCEDURE — C9113 INJ PANTOPRAZOLE SODIUM, VIA: HCPCS | Performed by: NURSE PRACTITIONER

## 2022-12-15 PROCEDURE — 74011250637 HC RX REV CODE- 250/637: Performed by: GENERAL ACUTE CARE HOSPITAL

## 2022-12-15 RX ADMIN — Medication 1000 MCG: at 09:04

## 2022-12-15 RX ADMIN — Medication 1 AMPULE: at 09:04

## 2022-12-15 RX ADMIN — METOPROLOL TARTRATE 25 MG: 25 TABLET, FILM COATED ORAL at 09:04

## 2022-12-15 RX ADMIN — PYRIDOXINE HCL TAB 50 MG 50 MG: 50 TAB at 09:04

## 2022-12-15 RX ADMIN — DOXYCYCLINE HYCLATE 100 MG: 100 TABLET, COATED ORAL at 09:04

## 2022-12-15 RX ADMIN — CASTOR OIL AND BALSAM, PERU: 788; 87 OINTMENT TOPICAL at 09:12

## 2022-12-15 RX ADMIN — APIXABAN 10 MG: 5 TABLET, FILM COATED ORAL at 09:03

## 2022-12-15 RX ADMIN — SODIUM CHLORIDE, PRESERVATIVE FREE 10 ML: 5 INJECTION INTRAVENOUS at 05:48

## 2022-12-15 RX ADMIN — SODIUM CHLORIDE, PRESERVATIVE FREE 40 MG: 5 INJECTION INTRAVENOUS at 09:04

## 2022-12-15 RX ADMIN — CEFDINIR 300 MG: 300 CAPSULE ORAL at 09:04

## 2022-12-15 RX ADMIN — GUAIFENESIN 200 MG: 200 SOLUTION ORAL at 01:44

## 2022-12-15 NOTE — PROGRESS NOTES
CM has finalized DC planning    Transition of Care Plan:     RUR:16%  Disposition:Reason for Admission:   Respiratory Distress                            PCP:    First and Last name:   Gavi Flor MD                 Name of Practice:               Are you a current patient: Yes/No: yes              Approximate date of last visit:               Can you participate in a virtual visit if needed:      Do you (patient/family) have any concerns for transition/discharge? PT recommending [place]                 Plan for utilizing home health:   yes     Current Advanced Directive/Advance Care Plan:  DNR        Healthcare Decision Maker:   Click here to complete [address] including selection of the Healthcare Decision Maker Relationship (ie \"Primary\")   Update: 11:19 [employer]) is going to be following patient. Family has been instructed. Company will see patient Saturday because wife did not want them to start tomorrow. He has a walker to be set up at home by family. Elquis card to be given to family. 2nd IM form signed. FOC form signed by patient. Update: 9:42 am  [place] referrals sent to [facility] note: [place] referrals sent to [facility] (wants CM to call next week)                                            [facility] (CM talked with on call service)                                              [facility] [place] (did not accept)                                             [facility] does not serve this area                                             [employer] (called, will call back)                                              [facility] ([place]) [facility] # [phone]([facility] sent 12/14)   referral, face sheet, orders, progress notes, insurance.  (Information sent to [employer] this afternoon)   Expected Discharge Date: Today  Care Management Interventions  Support Systems: Spouse/Significant Other, Child(diane), Other Family Member(s)  Discharge Location  Patient Expects to be Discharged to[de-identified] Home with home health          Revision History

## 2022-12-15 NOTE — DISCHARGE SUMMARY
Hospitalist Discharge Summary     Patient ID:  Naomi Ny  [id number]  34 y.o.  1940  12/10/2022    PCP on record: Carlos Smith MD    Admit date: 12/10/2022  Discharge date and time: 12/15/2022    DISCHARGE DIAGNOSIS:    -Acute hypoxic respiratory failure secondary to PE/pneumonia  -Community-acquired pneumonia  -Pulmonary embolism/DVT  -Lung adenocarcinoma with malignant pleural effusion and now mets to the brain      CONSULTATIONS:  IP CONSULT TO PALLIATIVE CARE - PROVIDER  IP CONSULT TO PALLIATIVE CARE - PROVIDER  IP CONSULT TO ONCOLOGY    Excerpted HPI from H&P of Irais Guillen MD:    80year old M pt with pmh of esophageal cancer s/p radiation and chemotherapy 14 years ago now with lung cancer with metastasis to the brain, followed at [facility] oncology and on palliative chemotherapy consisting of carboplatinum, Abraxane, and Keytruda, presents to [address] [place] [address] for cc of SOB. Upon arrival to [address] [place] [address] pt SPO2 88% on 6 L NC with a respiratory rate in the 40s. Placed on Bipap with some improvement in WOB. CTA chest done and showed Acute pulmonary embolism in the left lower lobar and left upper lobe segmental pulmonary artery levels, Increased size of right lung masses and Right lower lobe pneumonia. Given therapeutic Lovenox for PE and ceftriaxone and azithromycin for CAP. ICU consulted for admission. Upon my arrival pt breathing more comfortably on Bipap. -120s ST.  BP stable. Will be admitted to the ICU for further management.  ______________________________________________________________________  DISCHARGE SUMMARY/HOSPITAL COURSE:  for full details see H&P, daily progress notes, labs, consult notes. Patient was admitted for further work-up and treatment. He was found to have PE/pneumonia and was started on treatment. He required to be in the ICU and required to be on BiPAP with improvement and was eventually weaned off oxygen completely.   The patient was found to have mets to the brain and was evaluated by oncology and he will follow-up with them as outpatient. He was started on Eliquis. He responded well to treatment. He is to follow-up closely as outpatient. The treatment plan was discussed at length with the patient and his spouse prior to discharge and they are agreeable with the treatment plan and will follow up as outpatient. Patient is pending home health set up.        _______________________________________________________________________  Patient seen and examined by me on discharge day. Pertinent Findings:  Gen:    Not in distress  Chest: Clear lungs  CVS:   Regular rhythm. No edema  Abd:  Soft, not distended, not tender  Neuro:  Alert,   _______________________________________________________________________  DISCHARGE MEDICATIONS:   Current Discharge Medication List        START taking these medications    Details   !! apixaban (ELIQUIS) 5 mg tablet Take 2 Tablets by mouth two (2) times a day for 4 doses. Qty: 7 Tablet, Refills: 0  Start date: 12/14/2022, End date: 12/16/2022    Comments: 10 mg twice daily for 7 doses. Should take 5 mg twice daily thereafter. !! apixaban (ELIQUIS) 5 mg tablet Take 1 Tablet by mouth two (2) times a day. Qty: 60 Tablet, Refills: 3  Start date: 12/18/2022    Comments: To be started after finishing loading dose with 10 mg twice daily for 7 doses      cefdinir (OMNICEF) 300 mg capsule Take 1 Capsule by mouth every twelve (12) hours for 3 doses. Qty: 3 Capsule, Refills: 0  Start date: 12/14/2022, End date: 12/16/2022      doxycycline (VIBRA-TABS) 100 mg tablet Take 1 Tablet by mouth every twelve (12) hours for 3 doses. Qty: 3 Tablet, Refills: 0  Start date: 12/14/2022, End date: 12/16/2022      metoprolol tartrate (LOPRESSOR) 25 mg tablet Take 1 Tablet by mouth every twelve (12) hours.   Qty: 60 Tablet, Refills: 0  Start date: 12/14/2022      guaiFENesin (Mucinex) 1,200 mg Ta12 ER tablet Take 1 Tablet by mouth two (2) times a day.  Qty: 12 Tablet, Refills: 0  Start date: 12/14/2022      benzonatate (TESSALON) 200 mg capsule Take 1 Capsule by mouth three (3) times daily as needed for Cough for up to 7 days. Qty: 15 Capsule, Refills: 0  Start date: 12/14/2022, End date: 12/21/2022      albuterol (Ventolin HFA) 90 mcg/actuation inhaler Take 1 Puff by inhalation every six (6) hours as needed for Wheezing, Shortness of Breath or Respiratory Distress. Qty: 18 g, Refills: 2  Start date: 12/14/2022       !! - Potential duplicate medications found. Please discuss with provider. CONTINUE these medications which have NOT CHANGED    Details   Hydromet 5-1.5 mg/5 mL oral solution       methylphenidate HCl (RITALIN) 5 mg tablet       mirtazapine (REMERON) 30 mg tablet Start date: 12/10/2022      HYDROcodone-acetaminophen (NORCO) 5-325 mg per tablet       pembrolizumab (KEYTRUDA IV) Every 3 weeks      carbamide peroxide (DEBROX) 6.5 % otic solution Administer 5 Drops into each ear two (2) times a day. Qty: 30 mL, Refills: 1    Associated Diagnoses: Impacted cerumen of left ear      cholecalciferol, vitamin D3, 50 mcg (2,000 unit) tab Take 1 Tab by mouth daily. esomeprazole (NEXIUM) 40 mg capsule TAKE 1 CAPSULE DAILY  Qty: 90 Cap, Refills: 1      CYANOCOBALAMIN (VITAMIN B-12 PO) Take 1,000 mg by mouth daily. pyridoxine, vitamin B6, (VITAMIN B-6) 100 mg tablet Take 100 mg by mouth daily. MULTIVITAMINS (MULTIVITAMIN PO) Take 1 Tablet by mouth daily. STOP taking these medications       sildenafil citrate (VIAGRA) 50 mg tablet Comments:   Reason for Stopping:                 Patient Follow Up Instructions:    Activity: Activity as tolerated  Diet: Resume previous diet    Follow-up Information       Follow up With Specialties Details Why Contact Info    Daya Collazo MD Hematology and Oncology Follow up Friday 12/16/22 at 1:30pm [address]  [address]  [address] [place] [address]      Yu Canales Giuseppe Adams MD Internal Medicine Physician Go on 12/22/2022 at 11:45am, ARRIVE AT 11:30am, for your PCP hospital follow up. [address]  [address] [address]  [phone]      [facility] ([facility] Home Health  Follow up This company will see patient on Saturday [phone] [facility]          ________________________________________________________________    Risk of deterioration: Moderate    Condition at Discharge:  Stable  __________________________________________________________________    Disposition  Home with family and home health services    ____________________________________________________________________    Code Status: DNR/DNI  ___________________________________________________________________      Total time in minutes spent coordinating this discharge (includes going over instructions, follow-up, prescriptions, and preparing report for sign off to her PCP) :  >30 minutes    Signed:   Chaya Bautista DO

## 2022-12-15 NOTE — PROGRESS NOTES
-Hematology / Oncology ([facility]) -  -Primary Oncologist- Dr. Sabrina Angelo  -CC-\"I am ok\"    -S-  No complaints, wife at bedside, wants to go home today    -O-    Patient Vitals for the past 24 hrs:   Temp Pulse Resp BP SpO2   12/15/22 0725 97.8 °F (36.6 °C) 90 16 111/78 95 %   12/15/22 0326 98 °F (36.7 °C) 78 18 117/76 95 %   12/14/22 2356 98.1 °F (36.7 °C) 75 18 118/72 96 %   12/14/22 2002 98.1 °F (36.7 °C) 87 18 115/72 95 %   12/14/22 1440 98 °F (36.7 °C) 82 20 (!) 89/65 94 %   12/14/22 1047 -- -- -- 109/73 --   12/14/22 1033 98.1 °F (36.7 °C) 88 25 109/73 95 %     No intake/output data recorded. 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 Discussed options of hospice and wife seems very resistant stating \"We have to let him live as long as possible\"  -KRAS mutated lung cancer so may be candidate for oral sotorasib. - Outpt radiation referral since family does not want hospice at this time. - Will also see if he is willing to FU with Palliative care as oupt  - ok to dc today FU in clinic this Friday at 1:30pm     *) Acute PE and DVT:  - On Eliquis , will need lifelong AC     *) PNA:  - per primary     *) Malignant pleural effusion:  - Has Aspira and drains 3x per week.

## 2022-12-15 NOTE — PROGRESS NOTES
0700: Bedside shift change report given to Te Beltre (oncoming nurse) by Suasn House (offgoing nurse). Report included the following information SBAR and Kardex. 1215: AVS reviewed with pt, spouse, daughter. Allowed time for questions. PIV removed. 1225: Pt transported to exit in wheelchair. Discharged home with spouse and daughter.

## 2022-12-15 NOTE — PROGRESS NOTES
PCP hospital follow-up transitional care appointment has been scheduled with Dr. Flash Cerda on 12/22/22 at [phone]. Pending patient discharge.   Ray Munguia, Care Management Assistant

## 2022-12-16 ENCOUNTER — PATIENT OUTREACH (OUTPATIENT)
Dept: CASE MANAGEMENT | Age: 82
End: 2022-12-16

## 2022-12-16 LAB
BACTERIA SPEC CULT: NORMAL
BACTERIA SPEC CULT: NORMAL
SERVICE CMNT-IMP: NORMAL
SERVICE CMNT-IMP: NORMAL

## 2022-12-16 NOTE — PROGRESS NOTES
Care Transitions Initial Call    Call within 2 business days of discharge: Yes     Patient: Anthony Salinas Patient : 1940 MRN: [id number]    Last Discharge  [address]       Date Complaint Diagnosis Description Type Department Provider    12/10/22 Respiratory Distress Acute respiratory failure with hypoxia ([facility] [address] . .. ED to Hosp-Admission (Discharged) (ADMIT) [id number] [employer], DO; [place]. .. Was this an external facility discharge? No Discharge Facility: [facility]    Challenges to be reviewed by the provider   Additional needs identified to be addressed with provider: yes  home health care- [facility] home health pt to follow  medications- elequis started in the hospital along with 2 antibiotics and metoprolol. Brain mets discovered and heme onc appt on 22. Pcp follow up on 22         Method of communication with provider : face to face, chart routing, staff message, phone, none    Discussed COVID-19 related testing which was available at this time. Test results were negative. Patient informed of results, if available? In hospital     Advance Care Planning:   Does patient have an Advance Directive: not on file. Inpatient Readmission Risk score: Unplanned Readmit Risk Score: 14.9    Was this a readmission? no   Patient stated reason for the admission: he had trouble breathing    Patients top risk factors for readmission: functional physical ability, medical condition-brain mets, resp failure, pleural drain, medication management, and polypharmacy   Interventions to address risk factors: Scheduled appointment with PCP-22, Scheduled appointment with Specialist-22, and Obtained and reviewed discharge summary and/or continuity of care documents   [facility] to start seeing patient on 22    Care Transition Nurse (CTN) contacted the family by telephone to perform post hospital discharge assessment. Verified name and  with family as identifiers.  Provided introduction to self, and explanation of the CTN role. CTN reviewed discharge instructions, medical action plan and red flags with family who verbalized understanding. Were discharge instructions available to patient? yes. Reviewed appropriate site of care based on symptoms and resources available to patient including: PCP, Specialist, and Home Health. Family given an opportunity to ask questions and does not have any further questions or concerns at this time. The family agrees to contact the PCP office for questions related to their healthcare. Medication reconciliation was performed with family, who verbalizes understanding of administration of home medications. Advised obtaining a 90-day supply of all daily and as-needed medications. Referral to Pharm D needed: no     Home Health/Outpatient orders at discharge: [facility]: [facility]  Date of initial visit: 12/17/22    Durable Medical Equipment ordered at discharge: None    Was patient discharged with a pulse oximeter? no    Discussed follow-up appointments. If no appointment was previously scheduled, appointment scheduling offered: yes. Is follow up appointment scheduled within 7 days of discharge? yes. [facility]   Future Appointments   Date Time Provider Savannah Asencio   12/22/2022 11:45 AM Percell Galeazzi, MD [facility] BS AMB   12/29/2022  8:00 AM [facility] RCR PET DOSE 1 LAVINIA ROD MARISSA   12/29/2022  9:00 AM [facility] RCR PET 1 LAVINIA ROD MARISSA   6/7/2023  8:45 AM Percell Galeazzi, MD [facility] BS AMB     [facility] follow up appointment(s): oncology on 12/16/22, attended by patient and wife    Plan for follow-up call in 5-7 days based on severity of symptoms and risk factors. Plan for next call: medication management-antibiotics complete, elequis tolerance, oral chemo started  CTN provided contact information for future needs.      Goals Addressed                   This Visit's Progress     Prevent complications post hospitalization. 12/16/22    Patient verbalized understanding of completing both antibiotics. Patient wife verbalized understanding of elequis and the reason there are 2 prescriptions. Patient to attend pcp appt on 12/22/22  [facility] to call patient this evening and see patient 12/17/22  Wife empties and cares for pleural drain independently. If any bleeding is to be seen in pleural drainage she is to call pcp on call immediately or oncologist on call. Ctn to follow up in one week.    Ulices Mena RN

## 2022-12-22 ENCOUNTER — TELEPHONE (OUTPATIENT)
Dept: INTERNAL MEDICINE CLINIC | Age: 82
End: 2022-12-22

## 2022-12-22 ENCOUNTER — OFFICE VISIT (OUTPATIENT)
Dept: INTERNAL MEDICINE CLINIC | Age: 82
End: 2022-12-22
Payer: MEDICARE

## 2022-12-22 VITALS
SYSTOLIC BLOOD PRESSURE: 106 MMHG | HEART RATE: 73 BPM | WEIGHT: 142 LBS | DIASTOLIC BLOOD PRESSURE: 70 MMHG | HEIGHT: 69 IN | RESPIRATION RATE: 18 BRPM | OXYGEN SATURATION: 99 % | TEMPERATURE: 98.4 F | BODY MASS INDEX: 21.03 KG/M2

## 2022-12-22 DIAGNOSIS — I26.94 MULTIPLE SUBSEGMENTAL PULMONARY EMBOLI WITHOUT ACUTE COR PULMONALE (HCC): ICD-10-CM

## 2022-12-22 DIAGNOSIS — Z09 HOSPITAL DISCHARGE FOLLOW-UP: Primary | ICD-10-CM

## 2022-12-22 DIAGNOSIS — C79.31 BRAIN METASTASES (HCC): ICD-10-CM

## 2022-12-22 DIAGNOSIS — H61.22 IMPACTED CERUMEN OF LEFT EAR: ICD-10-CM

## 2022-12-22 DIAGNOSIS — C34.11 MALIGNANT NEOPLASM OF UPPER LOBE OF RIGHT LUNG (HCC): ICD-10-CM

## 2022-12-22 DIAGNOSIS — I47.1 SUPRAVENTRICULAR TACHYCARDIA (HCC): ICD-10-CM

## 2022-12-22 DIAGNOSIS — I10 ESSENTIAL HYPERTENSION: ICD-10-CM

## 2022-12-22 PROCEDURE — 3078F DIAST BP <80 MM HG: CPT | Performed by: INTERNAL MEDICINE

## 2022-12-22 PROCEDURE — 1123F ACP DISCUSS/DSCN MKR DOCD: CPT | Performed by: INTERNAL MEDICINE

## 2022-12-22 PROCEDURE — 3074F SYST BP LT 130 MM HG: CPT | Performed by: INTERNAL MEDICINE

## 2022-12-22 PROCEDURE — 99215 OFFICE O/P EST HI 40 MIN: CPT | Performed by: INTERNAL MEDICINE

## 2022-12-22 PROCEDURE — 1111F DSCHRG MED/CURRENT MED MERGE: CPT | Performed by: INTERNAL MEDICINE

## 2022-12-22 RX ORDER — METOPROLOL TARTRATE 25 MG/1
25 TABLET, FILM COATED ORAL EVERY 12 HOURS
Qty: 180 TABLET | Refills: 3 | Status: SHIPPED | OUTPATIENT
Start: 2022-12-22

## 2022-12-22 RX ORDER — SOTORASIB 120 MG/1
TABLET, COATED ORAL
COMMUNITY
Start: 2022-12-14

## 2022-12-22 NOTE — PROGRESS NOTES
Chief Complaint   Patient presents with    Hospital Follow Up       1. \"Have you been to the ER, urgent care clinic since your last visit? Hospitalized since your last visit? \"  [facility] for respiratory failure on 12/10/22-12/15/22    2. \"Have you seen or consulted any other health care providers outside of the [address] [place] [address] since your last visit? \" No     3. For patients aged 39-70: Has the patient had a colonoscopy / FIT/ Cologuard? No      If the patient is female:    4. For patients aged 41-77: Has the patient had a mammogram within the past 2 years? No      5. For patients aged 21-65: Has the patient had a pap smear?  No

## 2022-12-22 NOTE — PROGRESS NOTES
ICD-10-CM ICD-9-CM    1. Hospital discharge follow-up  Z09 V67.59 [place] DISCHARGE MEDS RECONCILED W/ CURRENT OUTPATIENT MED LIST      2. Multiple subsegmental pulmonary emboli without acute cor pulmonale (HCC)  I26.94 415.19 apixaban (ELIQUIS) 5 mg tablet      3. Essential hypertension  I10 401.9       4. Impacted cerumen of left ear  H61.22 380.4       5. Supraventricular tachycardia (HCC)  I47.1 427.89 metoprolol tartrate (LOPRESSOR) 25 mg tablet      6. Malignant neoplasm of upper lobe of right lung (HCC)  C34.11 162.3                Subjective:    Chief Complaint   Patient presents with    Hospital Follow Up       Chayo Hall is a 80 y.o. M. 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 He comes into clinic today for follow-up. He was recently admitted to the hospital for hypoxic respiratory failure. He had presented with difficulty breathing and shortness of breath, with respiratory rate in the 40s. He was placed on BiPAP with some improvement, and subsequent chest imaging had demonstrated an acute pulmonary embolism in the left lower lobe, and the left upper lobe with segmental PE. There was also noted interval increase in size of the right-sided lung masses and associated right lower lobe pneumonia. He was started on therapeutic enoxaparin and empiric antibiotic therapy with Rocephin and azithromycin. He was subsequently admitted to the ICU for his hypoxic respiratory failure. He was maintained on BiPAP, with subsequent improvement in his work of breathing, and subsequent stabilization in his clinical status. Subsequent brain imaging had demonstrated brain metastases, and he was advised to follow-up with oncology on an outpatient basis. He was subsequently started on Eliquis. Today, the patient comes in accompanied by his spouse for hospital followup.  Together, they report that the patient has been doing better since his discharge. He continues on Eliquis without any bleeding or bruising sequellae, and since his discharge he has not had any new symptoms of any focal neurological symptoms or new cardiopulmonary symptoms. His breathing overall has been stable. His output to his Pleurx catheter has if anyhting improved somewhat. They note that he is no longer on Keyruda and that the Oncology team has started him on a different medication (a Kras inhibitor) for palliation. They are going to discuss the longer term plan for his brain mets in a couple of weeks and are considering whether to do WBR vs. Stereotactic radiation. In the meantime, the patient's heart rate has been well controlled at home without palpitations, LHD, or other CP, while on a new dose of metoprolol tartrate 25 mg BID, as started during his hospitalization. They note pending followup with Pulmonary Medicine and with Oncology in the coming weeks as well as with Cardiology. ROS o/w negative. 20 minutes of today's 40 minute total appointment time is spent counseling patient and family about the long term plan of care and about end-of-life planning including DNR/DNI, POA, and other such considerations; they are precontemplative about these things at this time. Routine Healthcare Maintenance issues are reviewed and discussed with the patient as noted below. Orders to update gaps in healthcare maintenance were placed as noted below in the Assessment and Plan, where applicable.      Past Medical History:  Past Medical History:   Diagnosis Date    Anemia     r/t chemo    Arthritis     BPH without obstruction/lower urinary tract symptoms 01/16/2018    Bronchogenic carcinoma of right lung ([facility] [address] 05/2022    bronchogenic carcinoma centered posteriorly in the right upper lobe obstructing the posterior segmental bronchus of the right upper lobe; Stage IIIB; systemic therapy consisting of carboplatinum, Abraxane and Keytruda    Diet-controlled diabetes mellitus ([facility] [address]     Diverticulosis     Drug-induced polyneuropathy ([facility] [address] 01/16/2018    Due to chemotherapy    GERD with stricture 01/16/2018    Status post dilatation    History of bacterial pneumonia 12/2022    History of echocardiogram 12/2022    TTE - 12/22 -  Left Ventricle: Normal left ventricular systolic function with a visually estimated EF of 55 - 60%. Right ventricle is mildly dilated. Mildly reduced systolic function. Aortic Valve: Mild regurgitation. Tricuspid Valve: Mild regurgitation. The estimated RVSP is 36 mmHg.     History of esophageal cancer 2008    tx esophagectomy, chemo, radiation (complete 2014)    History of gout     History of pulmonary embolism 12/2022    RX = Lovenix --> Eliquis    Hx of colonic polyps     Hyperkalemia 04/29/2019    Kidney cyst     Malignant pleural effusion     With indwelling plerex catheter    CHAVEZ (nonalcoholic steatohepatitis) 2000    \"fatty liver\" diet related    Personal history of colonic polyps 12/06/2012    Primary hypertension     Stage 3 chronic kidney disease ([facility] [address] 04/29/2019    Thrombocytopenia (HCC)     r/t chemo    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 01/16/2018    Vitamin D deficiency 01/16/2018       Past Surgical Histor:  Past Surgical History:   Procedure Laterality Date    COLONOSCOPY N/A 6/23/2016    COLONOSCOPY performed by Niru Castellon MD at [facility] ENDOSCOPY    COLONOSCOPY N/A 7/2/2021    COLONOSCOPY performed by Qi Walker MD at [address] [place] [address] St  7/2/2021         HX CATARACT REMOVAL Bilateral     HX CHOLECYSTECTOMY  9/29/11    HX ESOPHAGECTOMY  2008    HX HEMORRHOIDECTOMY      HX HERNIA REPAIR      HX ROTATOR CUFF REPAIR Right 2014    HX VASCULAR ACCESS Right 2008    port placed and removed in 2014    IR INSERT CATH PLEURAL INDWELL  5/24/2022    IR INSERT TUNL CVC W PORT OVER 5 YEARS  5/19/2022    IR REMOVE TUNL CVAD W PORT/PUMP  7/7/2022    SC COLSC FLX W/RMVL OF TUMOR POLYP LESION SNARE TQ  7/1/2010         MA EGD BALLOON DILATION ESOPHAGUS <30 MM DIAM  3/3/2011         MA EGD BALLOON DILATION ESOPHAGUS <30 MM DIAM  5/22/2012         MA EGD TRANSORAL BIOPSY SINGLE/MULTIPLE  3/29/2012         UPPER GI ENDOSCOPY,RISA DIL,30MM  1/11/2018         UPPER GI ENDOSCOPY,RISA DIL,30MM  1/31/2020            Allergies:  No Known Allergies    Medications:  Current Outpatient Medications   Medication Sig Dispense Refill    metoprolol tartrate (LOPRESSOR) 25 mg tablet Take 1 Tablet by mouth every twelve (12) hours. 180 Tablet 3    [START ON 3/10/2023] apixaban (ELIQUIS) 5 mg tablet Take 1 Tablet by mouth two (2) times a day. Indications: a clot in the lung 180 Tablet 3    guaiFENesin (Mucinex) 1,200 mg Ta12 ER tablet Take 1 Tablet by mouth two (2) times a day. 12 Tablet 0    albuterol (Ventolin HFA) 90 mcg/actuation inhaler Take 1 Puff by inhalation every six (6) hours as needed for Wheezing, Shortness of Breath or Respiratory Distress. 18 g 2    Hydromet 5-1.5 mg/5 mL oral solution       mirtazapine (REMERON) 30 mg tablet       HYDROcodone-acetaminophen (NORCO) 5-325 mg per tablet       carbamide peroxide (DEBROX) 6.5 % otic solution Administer 5 Drops into each ear two (2) times a day. 30 mL 1    cholecalciferol, vitamin D3, 50 mcg (2,000 unit) tab Take 1 Tab by mouth daily. esomeprazole (NEXIUM) 40 mg capsule TAKE 1 CAPSULE DAILY 90 Cap 1    CYANOCOBALAMIN (VITAMIN B-12 PO) Take 1,000 mg by mouth daily. pyridoxine, vitamin B6, (VITAMIN B-6) 100 mg tablet Take 100 mg by mouth daily. MULTIVITAMINS (MULTIVITAMIN PO) Take 1 Tablet by mouth daily.       Lumakras 120 mg tab Take 960 mg (8 tabs)daily for 28 days each cycle         Social History:  Social History     Socioeconomic History    Marital status:    Tobacco Use    Smoking status: Former     Packs/day: 2.00     Years: 25.00     Pack years: 50.00     Types: Cigarettes     Quit date: 1/1/1987     Years since quittin.9    Smokeless tobacco: Never   Vaping Use    Vaping Use: Never used   Substance and Sexual Activity    Alcohol use: No    Drug use: No       Family History:  Family History   Problem Relation Age of Onset    Diabetes Mother     OSTEOARTHRITIS Father     Cancer Sister         lung    Cancer Sister         breast       Immunizations:  Immunization History   Administered Date(s) Administered    COVID-19, PFIZER PURPLE top, DILUTE for use, (age 15 y+), IM, 30mcg/0.3mL 2021, 2021, 2021    Influenza High Dose Vaccine PF 2017, 2022    Influenza Vaccine 10/13/2015, 2016, 2020    Influenza Vaccine (Tri) Adjuvanted (>65 Yrs FLUAD TRI 82093) 2018, 10/11/2019    Influenza Vaccine Split 2011    Influenza, FLUARIX, FLULAVAL, [place] (age 10 mo+) AND AFLURIA, (age 1 y+), PF, 0.5mL 10/06/2021    Pneumococcal Conjugate (PCV-13) 2015    Pneumococcal Polysaccharide (PPSV-23) 10/23/2008, 2009    Tdap 2019    Zoster Recombinant 2019, 2019        Healthcare Maintenance:  Health Maintenance   Topic Date Due    Eye Exam Retinal or Dilated  2021    COVID-19 Vaccine (4 - Booster for Pfizer series) 2021    A1C test (Diabetic or Prediabetic)  2022    Lipid Screen  2023    Medicare Yearly Exam  2023    Depression Screen  2023    DTaP/Tdap/Td series (2 - Td or Tdap) 2029    Shingles Vaccine  Completed    Flu Vaccine  Completed    Pneumococcal 65+ years  Completed        Review of Systems:  ROS:  Review of Systems   Constitutional: Negative. HENT: Negative. Eyes: Negative. Respiratory: Negative. Cardiovascular: Negative. Gastrointestinal: Negative. Genitourinary: Negative. Musculoskeletal: Negative. Skin: Negative. Neurological: Negative. Endo/Heme/Allergies: Negative. Psychiatric/Behavioral: Negative.       ROS otherwise negative      Objective:    Vital Signs:  Visit Vitals  /70 (BP 1 Location: Left upper arm, BP Patient Position: Sitting, BP Cuff Size: Adult)   Pulse 73   Temp 98.4 °F (36.9 °C) (Oral)   Resp 18   Ht 5' 9\" (1.753 m)   Wt 142 lb (64.4 kg)   SpO2 99%   BMI 20.97 kg/m²       BMI:  Body mass index is 20.97 kg/m². Physical Examination:  Physical Exam  Vitals reviewed. Constitutional:       Appearance: Normal appearance. He is ill-appearing (chronically ill appearing). He is not toxic-appearing. HENT:      Head: Normocephalic and atraumatic. Left Ear: There is impacted cerumen. Nose: Nose normal.      Mouth/Throat:      Mouth: Mucous membranes are moist.   Eyes:      Extraocular Movements: Extraocular movements intact. Conjunctiva/sclera: Conjunctivae normal.      Pupils: Pupils are equal, round, and reactive to light. Cardiovascular:      Rate and Rhythm: Normal rate and regular rhythm. Pulses: Normal pulses. Heart sounds: Normal heart sounds. No murmur heard. No friction rub. No gallop. Pulmonary:      Effort: Pulmonary effort is normal. No respiratory distress. Breath sounds: No wheezing, rhonchi or rales. Comments: Broadly diminished R lung with pleurx catheter in place  Abdominal:      General: Bowel sounds are normal. There is no distension. Palpations: Abdomen is soft. There is no mass. Tenderness: There is no abdominal tenderness. There is no guarding or rebound. Musculoskeletal:         General: No tenderness, deformity or signs of injury. Normal range of motion. Cervical back: Normal range of motion and neck supple. Right lower leg: No edema. Left lower leg: No edema. Skin:     General: Skin is warm and dry. Findings: No bruising, lesion or rash. Neurological:      General: No focal deficit present. Mental Status: He is alert and oriented to person, place, and time. Mental status is at baseline. Cranial Nerves: No cranial nerve deficit.       Motor: No weakness. Coordination: Coordination normal.      Gait: Gait abnormal (walks with cane, rollator). Psychiatric:         Mood and Affect: Mood normal.         Behavior: Behavior normal.        Physical exam otherwise negative    Diagnostic Testing:    Laboratory Studies:  No results displayed because visit has over 200 results. Hospital Outpatient Visit on 09/30/2022   Component Date Value Ref Range Status    Glucose (POC) 09/30/2022 78  65 - 117 mg/dL Final    Comment: (NOTE)  The FDA has indicated that no capillary point of care blood glucose  monitoring systems are approved for use in \"critically ill\" patients,  however they have not defined this population. The College of  American Pathologists has recommended that these devices should not  be used in cases such as severe hypotension, dehydration, shock, and  hyperglycemic-hyperosmolar state, amongst others. Venous or arterial  collection is the recommended specimen for testing these patients. Performed by 09/30/2022 Ele Sanders   Final   Admission on 09/19/2022, Discharged on 09/19/2022   Component Date Value Ref Range Status    Ventricular Rate 09/19/2022 92  BPM Final    Atrial Rate 09/19/2022 92  BPM Final    P-R Interval 09/19/2022 150  ms Final    QRS Duration 09/19/2022 70  ms Final    Q-T Interval 09/19/2022 352  ms Final    QTC Calculation (Bezet) 09/19/2022 435  ms Final    Calculated P Axis 09/19/2022 49  degrees Final    Calculated R Axis 09/19/2022 28  degrees Final    Calculated T Axis 09/19/2022 47  degrees Final    Diagnosis 09/19/2022    Final                    Value:Normal sinus rhythm  Low voltage QRS  When compared with ECG of 30-APR-2014 15:18,  Vent.  rate has increased BY  32 BPM  Confirmed by Elliott Mortensen P.V. ([id number]) on 9/19/2022 2:59:13 PM      WBC 09/19/2022 6.6  4.1 - 11.1 K/uL Final    RBC 09/19/2022 4.03 (A)  4.10 - 5.70 M/uL Final    HGB 09/19/2022 12.5  12.1 - 17.0 g/dL Final    HCT 09/19/2022 39.3  36.6 - 50.3 % Final    MCV 09/19/2022 97.5  80.0 - 99.0 FL Final    MCH 09/19/2022 31.0  26.0 - 34.0 PG Final    MCHC 09/19/2022 31.8  30.0 - 36.5 g/dL Final    RDW 09/19/2022 13.6  11.5 - 14.5 % Final    PLATELET 43/98/5314 622  150 - 400 K/uL Final    MPV 09/19/2022 10.8  8.9 - 12.9 FL Final    NRBC 09/19/2022 0.0  0  WBC Final    ABSOLUTE NRBC 09/19/2022 0.00  0.00 - 0.01 K/uL Final    NEUTROPHILS 09/19/2022 60  32 - 75 % Final    LYMPHOCYTES 09/19/2022 25  12 - 49 % Final    MONOCYTES 09/19/2022 9  5 - 13 % Final    EOSINOPHILS 09/19/2022 5  0 - 7 % Final    BASOPHILS 09/19/2022 1  0 - 1 % Final    IMMATURE GRANULOCYTES 09/19/2022 0  0.0 - 0.5 % Final    ABS. NEUTROPHILS 09/19/2022 4.0  1.8 - 8.0 K/UL Final    ABS. LYMPHOCYTES 09/19/2022 1.7  0.8 - 3.5 K/UL Final    ABS. MONOCYTES 09/19/2022 0.6  0.0 - 1.0 K/UL Final    ABS. EOSINOPHILS 09/19/2022 0.3  0.0 - 0.4 K/UL Final    ABS. BASOPHILS 09/19/2022 0.1  0.0 - 0.1 K/UL Final    ABS. IMM. GRANS. 09/19/2022 0.0  0.00 - 0.04 K/UL Final    DF 09/19/2022 AUTOMATED    Final    Sodium 09/19/2022 141  136 - 145 mmol/L Final    Potassium 09/19/2022 4.7  3.5 - 5.1 mmol/L Final    Chloride 09/19/2022 108  97 - 108 mmol/L Final    CO2 09/19/2022 30  21 - 32 mmol/L Final    Anion gap 09/19/2022 3 (A)  5 - 15 mmol/L Final    Glucose 09/19/2022 112 (A)  65 - 100 mg/dL Final    BUN 09/19/2022 24 (A)  6 - 20 MG/DL Final    Creatinine 09/19/2022 0.94  0.70 - 1.30 MG/DL Final    BUN/Creatinine ratio 09/19/2022 26 (A)  12 - 20   Final    GFR est AA 09/19/2022 >60  >60 ml/min/1.73m2 Final    GFR est non-AA 09/19/2022 >60  >60 ml/min/1.73m2 Final    Estimated GFR is calculated using the IDMS-traceable Modification of Diet in Renal Disease (MDRD) Study equation, reported for both  Americans (GFRAA) and non- Americans (GFRNA), and normalized to 1.73m2 body surface area. The physician must decide which value applies to the patient.     Calcium 09/19/2022 9.1  8.5 - 10.1 MG/DL Final    Bilirubin, total 09/19/2022 1.0  0.2 - 1.0 MG/DL Final    ALT (SGPT) 09/19/2022 18  12 - 78 U/L Final    AST (SGOT) 09/19/2022 24  15 - 37 U/L Final    Alk. phosphatase 09/19/2022 95  45 - 117 U/L Final    Protein, total 09/19/2022 6.4  6.4 - 8.2 g/dL Final    Albumin 09/19/2022 2.9 (A)  3.5 - 5.0 g/dL Final    Globulin 09/19/2022 3.5  2.0 - 4.0 g/dL Final    A-G Ratio 09/19/2022 0.8 (A)  1.1 - 2.2   Final    SAMPLES BEING HELD 09/19/2022 BRADEN AGUILERA   Final    COMMENT 09/19/2022 Add-on orders for these samples will be processed based on acceptable specimen integrity and analyte stability, which may vary by analyte. Final    NT pro-BNP 09/19/2022 360  <450 PG/ML Final    Comment:      NT-proBNP is highly sensitive for the detection of acute congestive heart failure in dyspneic patients. A NT-proBNP result <300 pg/mL has a negative predictive value of 98% for acute heart failure. Marked elevations in NT-proBNP levels may be observed in patients with left ventricular congestive failure, atrial fibrillation without clear heart failure, acute coronary syndromes, right heart strain/failure (including pulmonary embolism and cor pulmonale), critical illness, renal failure or advanced age. Falsely low NT-proBNP may be observed in obese CHF patients.        Recent research  suggests the following cutoff values are appropriate based on patient age and clinical setting, reduce false positive (FP) results, and improve positive predictive values for acute heart failure:  Acutely dyspneic patient: age <50, use cutoff of 450 pg/mL  Acutely dyspneic patient: age 54-65, use cutoff of 900 pg/mL  Acutely dyspneic patient: age                            >76, use cutoff of 1800 pg/mL       FDA approved cutpoints for ruling-out heart failure in the office:  Ambulatory patient: age <76, use cutoff of 125 pg/mL  Ambulatory patient: age >76, use cutoff of 450 pg/mL      Troponin-High Sensitivity 09/19/2022 10  0 - 76 ng/L Final Comment: A HS troponin value change of (+ or -) 50% or more below the 99th percentile, in a 1/2/3 hr interval represents a significant change. Clinical correcation is recommended. A HS troponin value change of (+ or -) 20% or above the 99th percentile, in a 1/2/3 hr interval represents a significant change. Clinical correlation is recommended. 99th Percentile:   Women: 0-51 ng/L                                                                Men:   0-76 ng/L      Magnesium 09/19/2022 1.7  1.6 - 2.4 mg/dL Final   Hospital Outpatient Visit on 06/28/2022   Component Date Value Ref Range Status    Glucose (POC) 06/28/2022 87  65 - 117 mg/dL Final    Comment: (NOTE)  The FDA has indicated that no capillary point of care blood glucose  monitoring systems are approved for use in \"critically ill\" patients,  however they have not defined this population. The College of  American Pathologists has recommended that these devices should not  be used in cases such as severe hypotension, dehydration, shock, and  hyperglycemic-hyperosmolar state, amongst others. Venous or arterial  collection is the recommended specimen for testing these patients. Performed by 06/28/2022 Yury Ariza   Final         Radiographic Studies:  XR Results (most recent):  Results from Hospital Encounter encounter on 12/10/22    XR CHEST PORT    Narrative  EXAM:  XR CHEST PORT    INDICATION: Shortness of breath    COMPARISON: 9/19/2022    TECHNIQUE: PA and lateral chest views    FINDINGS: The right chest tube is stable. The cardiomediastinal contours are  stable. The pulmonary vasculature is within normal limits. There is an increased small to moderate right pleural effusion and right basilar  opacity. Lung nodules are better seen on prior CT. The left pleural space is  clear. There is no pneumothorax. The bones and upper abdomen are stable.     Impression  Increased small to moderate right pleural effusion and right basilar opacity  that may represent atelectasis, edema, or infection. Right chest tube in place. SUNITHA Results (most recent):  No results found for this or any previous visit. CT Results (most recent):  Results from Hospital Encounter encounter on 12/10/22    CTA CHEST W OR W WO CONT    Narrative    Acute pulmonary embolism in the left lower lobar and left upper lobe segmental  pulmonary artery levels. Increased size of right lung masses. Right lower lobe  pneumonia. The preliminary findings were discussed with the emergency department on  12/11/2022 at 0055 hours by Dr. Emily Aguilar. [phone]    Final report to follow. EXAM:  CTA CHEST W OR W WO CONT    INDICATION:  Shortness of breath, history of lung cancer    COMPARISON: September 19, 2022    TECHNIQUE: Helical thin section chest CT following uneventful intravenous  administration of nonionic contrast according to departmental PE protocol. Coronal and sagittal reformats were performed. 3D/MIP post processing was  performed. CT dose reduction was achieved through use of a standardized protocol  tailored for this examination and automatic exposure control for dose  modulation. FINDINGS: This is a good quality study for the evaluation of pulmonary embolism  to the first subsegmental arterial level. There is lobar thrombus in the  lingular and left lower lobe pulmonary arteries as well as segmental thrombus in  the left upper lobe apical/posterior segment. Normal caliber thoracic aorta. No aneurysm. Normal heart size. Scattered  coronary calcifications. No pericardial effusion. No lymphadenopathy by imaging  size criteria. Right perihilar lung mass measures 6.8 x 6.8 cm, increased in size compared to  prior imaging. Heterogeneous attenuation of the right lower lobe either  infiltrated with tumor or secondary to pneumonia. Loculated right pleural  effusion with a pleural drainage catheter in place. Left lung is clear. Central  airways are unremarkable.     Status post esophagectomy with gastric pull-through. Large right renal cyst. No  aggressive osseous lesion. Impression  1. Left upper and lower lobe segmental and lobar pulmonary embolism. 2.  No right-sided pulmonary embolism. 3.  Increased size of right perihilar lung mass. 4.  Small right pleural effusion with pleural drainage catheter in place. 5.  Heterogeneous attenuation of the right lower lobe suggestive of pneumonia. DEXA Results (most recent):  No results found for this or any previous visit. MRI Results (most recent):  Results from [place] encounter on 12/09/22    MRI BRAIN W WO CONT    Narrative  EXAM:  MRI BRAIN W WO CONT    INDICATION:    Esophageal carcinoma    COMPARISON: May 19, 2022    CONTRAST: 15 cc IV ProHance    TECHNIQUE:  Multiplanar multisequence acquisition without and with contrast of the brain. FINDINGS:  Diffusion imaging does not show acute ischemic changes. Extra-axial fluid collection hemorrhage or shift. Mild nonspecific white matter changes. Multiple Small 4 mm ring-enhancing lesions without significant surrounding edema  as seen bilaterally supra and infratentorially. 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 Impression  brain metastases. Assessment/Plan:      ICD-10-CM ICD-9-CM    1. Hospital discharge follow-up  Z09 V67.59 [place] DISCHARGE MEDS RECONCILED W/ CURRENT OUTPATIENT MED LIST      2. Multiple subsegmental pulmonary emboli without acute cor pulmonale (HCC)  I26.94 415.19 apixaban (ELIQUIS) 5 mg tablet      3. Essential hypertension  I10 401.9       4. Impacted cerumen of left ear  H61.22 380.4       5. Supraventricular tachycardia (HCC)  I47.1 427.89 metoprolol tartrate (LOPRESSOR) 25 mg tablet      6.  Malignant neoplasm of upper lobe of right lung (HCC)  C34.11 162.3              Lung Cancer:   - NSCLC on R with Brain Mets   - Deferring KRAS inhibitor / other treatment considerations to Oncology   - Agree with consideration for WBR vs. Stereotactic RT; defer to Rad/Onc   - No new focal neurological symptoms   - Discussed return precautions should these occu   - A long discussion regarding end-of-life care , POA, DNR/DNI and other considerations was held with patient and spouse   - ACP referral to continue discussion   - RTC 1 month    Venous Thromboembolism:   - Previous location of VTE: PE   - Current Anticoagulation:   Key Anti-Platelet Anticoagulant Meds               apixaban (ELIQUIS) 5 mg tablet Starting on 3/10/2023. Take 1 Tablet by mouth two (2) times a day. Indications: a clot in the lung            - Anticipated Duration of therapy: Lifelong   - Excess Bleeding? No    - Continue Anticoagulation: Yes    - Notes: [facility]    Essential Hypertension/Blood Pressure Management:   - Home BP Readings: not doing   - Current Control: optimal   - Target BP: <130/80 mmhg   - Relevant BP Meds:  Key CAD CHF Meds               metoprolol tartrate (LOPRESSOR) 25 mg tablet (Taking) Take 1 Tablet by mouth every twelve (12) hours. apixaban (ELIQUIS) 5 mg tablet Starting on 3/10/2023. Take 1 Tablet by mouth two (2) times a day. Indications: a clot in the lung               - Plan: continue current treatment regimen, continue current meds   - Notes:       Supraventricular tachycardia   - Heart Rate Target: 60-90 bpm at rest   - Current Heart Rate Control: At target   - Current Symptoms: none   - Current heart rate control: B-blocker   - Current anticoagulation:   Key Anti-Platelet Anticoagulant Meds               apixaban (ELIQUIS) 5 mg tablet Starting on 3/10/2023. Take 1 Tablet by mouth two (2) times a day. Indications: a clot in the lung          . Tolerating well without bleeding/bruising sequelae. - Medication plan: continue                  I have reviewed the patient's medical history in detail and updated the computerized patient record.       We had a prolonged discussion about these complex clinical issues and went over the various important aspects to consider. All questions were answered. Advised the patient to call back or return to office if symptoms do not improve, change in nature, or persist.     The patient was given an after visit summary or informed of [facility] which includes patient instructions, diagnoses, current medications, & vitals. he expressed understanding with the diagnosis and plan. Afshin Adam MD    Please note that this dictation was completed with [employer], the computer voice recognition software. Quite often unanticipated grammatical, syntax, homophones, and other interpretive errors are inadvertently transcribed by the computer software. Please disregard these errors. Please excuse any errors that have escaped final proofreading.    This was a complex patient and total appointment time was at least 40 minutes, to include:  - review of medical record  - history gathering, physical examination, and review of systems  - medication reconciliation  - medical decision-making  - counseling on the plan of care

## 2022-12-22 NOTE — TELEPHONE ENCOUNTER
Ioana Stallworth from [facility] called with an update regarding the patient. Stated that she wanted to update Dr. Luz Uribe that the patient has declined OT.

## 2022-12-23 ENCOUNTER — DOCUMENTATION ONLY (OUTPATIENT)
Dept: CASE MANAGEMENT | Age: 82
End: 2022-12-23

## 2022-12-23 NOTE — ACP (ADVANCE CARE PLANNING)
2/3/2020      RE: Elizabeth Palma  [address]  [facility] [address]       1    Elizabeth Palma is 62 year old female here at the request of Dr. Fuentes for cardiovascular, pulmonary, and perioperative risk assessment prior to surgery.The intended surgical procedure is right ulnar and carpal tunnel release. A copy of this note will be sent to the surgeon.    This is a LOW risk surgery.      HPI:   Reason for surgery:  Has been experiencing bilateral tingling for years that shoots up the forearm to the elbow. Never knows when it will hit her. Feels like she is being shocked. Has had multiple surgical releases which have helped with the pain, but the neuropathic pain keeps returning. Hasn't had any complications with procedures in past. Last procedure ~5 years ago.      Cardiovascular Risk:  This patient ambulates without assist. without chest pain. She IS able to climb a flight of stairs without chest pain.    The patient does not have chest pain rest or exercise.    Shedoes not have a history of known cardiac disease, prior MI, smoker and arrhythmia. Does have history of diabetes, HTN.  The patient does not have a history of stroke, and does not have a history of valvular disease.    Pulmonary Risk:  In terms of risk factors for pulmonary complications, the patient does have a history of exercise induced Asthma but does not use inhaler regularly    Perioperative Complications:  The patient does not have a history of bleeding or clotting problems in the past. The patient has not had complications from past surgeries including prior kidney transplant, carpal tunnel release, and multiple other procedures.  The patient does have a family history of any anesthesia or surgical complications. She notes her sister  from general anesthesia during a valve repair surgery in 2007.    ROS:  Constitutional: no fevers, night sweats or unintentional weight change   Eyes: no vision change, diplopia  Advance Care Planning   Ambulatory ACP Specialist Patient Outreach    Date:  12/23/2022    ACP Specialist:  Toya Huertas LMSW    Outreach call to patient in follow-up to ACP Specialist referral from:    [x] PCP  [] Provider   [] Ambulatory Care Management [] Other     For:                  [x] Advance Directive Assistance              [] Complete Portable DNR order              [] Complete POST/MOST              [x] Code Status Discussion             [x] Discuss Goals of Care             [x] Early ACP Decision-Making              [] Other (Specify)    Date Referral Received:12/22/2022    Today's Outreach:  [x] First   [] Second  [] Third       Third outreach made by: [] Phone  [] Email / mail    [] Joaquim     Intervention:  [x] Spoke with Patient's family  [] Left VM requesting return call      Outcome:BETO called and spoke with pt's spouse and daughter. They stated that pt already has ACP in place and they don't need to complete a new one. BETO talked more at length with pt's dtr who said that they have been told pt can't make it through being put on a vent again. However, they would like to try everything they can up until that point. SW talked with her about a POST form and possibly completing that kind of document for pt. Pt's dtr said she would talk with her parents about this but they may need time to think. SW will e-mail info on [place] POST and move to close this referral at this time. Pt's family knows how to get back in touch if/when they would want to move forward in the future. [facility] info has been updated as well. Next Step:   [] ACP scheduled conversation  [] Outreach again in one week               [x] Email / Mail ACP Info Sheets  [] Email / Mail Advance Directive   [x] Closing referral.  Routing closure to referring provider/staff and to ACP Specialist . [] Closure letter mailed to patient with invitation to contact ACP Specialist if / when ready.   Thank you for this or red eyes   Ears, Nose, Mouth, Throat: no tinnitus or hearing change, no epistaxis or nasal discharge, no oral lesions, throat clear   Cardiovascular: no chest pain, palpitations, or pain with walking, no orthopnea or PND   Respiratory: no dyspnea, cough, shortness of breath or wheezing   GI: no nausea, vomiting, diarrhea or constipation, no abdominal pain   : no change in urine, no dysuria or hematuria  Musculoskeletal: no joint or muscle pain or swelling   Integumentary: no concerning lesions or moles   Neuro: no loss of strength or sensation, no numbness or tingling, no tremor, no dizziness, no headache   Endo: no polyuria or polydipsia, no temperature intolerance   Heme/Lymph: no concerning bumps, no bleeding problems   Allergy: no environmental allergies   Psych: stable      Past Medical History:   Diagnosis Date     Abnormal MRI, cervical spine 10/15/2011    2011; mild changes noted. Study done for left arm symptoms Impression:  1. Mild multilevel degenerative disc disease with no significant canal or neural stenosis seen. motion artifact on the STIR images in these are not interpretable. The remaining images were interpreted      Autosomal dominant polycystic kidney disease 2011     (Problem list name updated by automated process. Provider to review and confirm.)     CMC DJD(carpometacarpal degenerative joint disease), localized primary 3/5/2013     -donor kidney transplant 3/20/2014     Depressive disorder 11/15/2012     DM type 2 (diabetes mellitus, type 2) (H) 2013     Encounter for long-term (current) use of other medications 2015     Family history of tremor 10/17/2011     Gastroesophageal reflux disease      Generalized anxiety disorder 11/15/2012     Glaucoma      Hyperlipidemia 10/15/2011     Hyperparathyroidism, secondary (H) 2015     Hypertension     resolved     Immunosuppressed status (H) 3/20/2014     Major depressive disorder, recurrent episode, moderate (H)  referral. 11/15/2012     Obesity (BMI 30-39.9)      OP (osteoporosis) T score -3.8 9/21/2009 2007 T-score -3.7      LION (obstructive sleep apnoea) 10/15/2012    intol to cpa     Pain in joint, forearm -- L unhealed Fx 5/21/2013     Premature menopause age 35 7/10/2012    OCP (vaginal bldg)-->HT which she stopped 2 mo later documented at Jan 12, 2007 visit (age 49).      Restless leg syndrome      Rib fractures 4/13/2013     Sensory loss 10/17/2011    Bottom of feet; uncertain if there is a neuropathy per notes.       Stiffness of joint, not elsewhere classified, hand 3/5/2013     Tremor 10/15/2011    head     Uncomplicated asthma       PAST SURGICAL HISTORY  Current Outpatient Medications   Medication     acetaminophen (TYLENOL) 325 MG tablet     acetylcysteine (N-ACETYL-L-CYSTEINE) 600 MG CAPS capsule     albuterol (PROAIR HFA/PROVENTIL HFA/VENTOLIN HFA) 108 (90 Base) MCG/ACT inhaler     ARIPiprazole (ABILIFY) 2 MG tablet     aspirin EC 81 MG EC tablet     blood glucose monitoring (ACCU-CHEK RALPH PLUS) meter device kit     blood glucose monitoring (NO BRAND SPECIFIED) meter device kit     blood glucose monitoring (NO BRAND SPECIFIED) test strip     blood glucose monitoring (SOFTCLIX) lancets     Cholecalciferol (VITAMIN D) 1000 UNITS capsule     cyanocolbalamin (VITAMIN  B-12) 1000 MCG tablet     cycloSPORINE modified (GENERIC EQUIVALENT) 25 MG capsule     cycloSPORINE modified (GENERIC EQUIVALENT) 25 MG capsule     econazole nitrate 1 % cream     estradiol (VAGIFEM) 10 MCG TABS vaginal tablet     ferrous sulfate (FEROSUL) 325 (65 Fe) MG tablet     fluticasone (FLONASE) 50 MCG/ACT nasal spray     furosemide (LASIX) 20 MG tablet     gabapentin (NEURONTIN) 300 MG capsule     latanoprost (XALATAN) 0.005 % ophthalmic solution     metFORMIN (GLUCOPHAGE-XR) 500 MG 24 hr tablet     mupirocin (BACTROBAN) 2 % external ointment     mycophenolate (GENERIC EQUIVALENT) 250 MG capsule     omeprazole (PRILOSEC) 40 MG DR capsule      ondansetron (ZOFRAN-ODT) 4 MG ODT tab     order for DME     order for DME     Polyvinyl Alcohol-Povidone (REFRESH OP)     prazosin (MINIPRESS) 2 MG capsule     prazosin (MINIPRESS) 5 MG capsule     simvastatin (ZOCOR) 20 MG tablet     sulfamethoxazole-trimethoprim (BACTRIM/SEPTRA) 400-80 MG tablet     topiramate (TOPAMAX) 200 MG tablet     Vaginal Lubricant (REPLENS) GEL     vilazodone (VIIBRYD) 40 MG TABS tablet     No current facility-administered medications for this visit.      Immunization History   Administered Date(s) Administered     FLU 6-35 months 10/04/2015, 09/24/2016     Hep B, Peds or Adolescent 02/04/2010     HepB 02/04/2010, 03/17/2010, 08/09/2010     HepB, Unspecified 03/17/2010, 08/09/2010     Influenza (H1N1) 11/01/2009     Influenza (IIV3) PF 11/05/1999, 09/01/2008, 10/01/2009, 10/26/2011, 09/15/2012, 10/01/2013, 10/01/2014, 10/01/2015     Influenza Vaccine IM > 6 months Valent IIV4 09/25/2016, 09/29/2017, 10/01/2018, 10/03/2019     Influenza Vaccine, 6+MO IM (QUADRIVALENT W/PRESERVATIVES) 09/29/2017     Mantoux Tuberculin Skin Test 02/15/2010     Pneumococcal 23 valent 11/25/2008     TDAP Vaccine (Adacel) 01/01/2004, 10/12/2012     TDAP Vaccine (Boostrix) 10/12/2012     Tetanus 04/05/2005     Zoster vaccine recombinant adjuvanted (SHINGRIX) 10/18/2019       PHYSICAL EXAM:  LMP  (LMP Unknown)     Wt Readings from Last 1 Encounters:   02/03/20 76 kg (167 lb 8 oz)       Constitutional: no distress, comfortable, pleasant   Eyes: anicteric, normal extra-ocular movements   Ears, Nose and Throat: tympanic membranes clear, nose clear and free of lesions, throat clear, neck supple with full range of motion, no thyromegaly.   Cardiovascular: regular rate and rhythm, normal S1 and S2, no murmurs, rubs or gallops, peripheral pulses full and symmetric   Respiratory: CTAB, no wheezes or crackles, normal breath sounds   Gastrointestinal: positive bowel sounds, nontender, no hepatosplenomegaly, no masses    Musculoskeletal: full range of motion, no edema   Skin: no concerning lesions, no jaundice   Neurological: positive Tinel's sign bilaterally with decreased strength in fingers bilaterally, worse in right hand than left. Sensation to light touch grossly intact BUE.  Psychological: appropriate mood   Lymphatic: no cervical, axillary or inguinal lymphadenopathy      A/P:    The patient with   Past Medical History:   Diagnosis Date     Abnormal MRI, cervical spine 10/15/2011    2011; mild changes noted. Study done for left arm symptoms Impression:  1. Mild multilevel degenerative disc disease with no significant canal or neural stenosis seen. motion artifact on the STIR images in these are not interpretable. The remaining images were interpreted      Autosomal dominant polycystic kidney disease 2011     (Problem list name updated by automated process. Provider to review and confirm.)     CMC DJD(carpometacarpal degenerative joint disease), localized primary 3/5/2013     -donor kidney transplant 3/20/2014     Depressive disorder 11/15/2012     DM type 2 (diabetes mellitus, type 2) (H) 2013     Encounter for long-term (current) use of other medications 2015     Family history of tremor 10/17/2011     Gastroesophageal reflux disease      Generalized anxiety disorder 11/15/2012     Glaucoma      Hyperlipidemia 10/15/2011     Hyperparathyroidism, secondary (H) 2015     Hypertension     resolved     Immunosuppressed status (H) 3/20/2014     Major depressive disorder, recurrent episode, moderate (H) 11/15/2012     Obesity (BMI 30-39.9)      OP (osteoporosis) T score -3.8 2009 T-score -3.7      LION (obstructive sleep apnoea) 10/15/2012    intol to cpa     Pain in joint, forearm -- L unhealed Fx 2013     Premature menopause age 35 7/10/2012    OCP (vaginal bldg)-->HT which she stopped 2 mo later documented at 2007 visit (age 49).      Restless leg syndrome      Rib  fractures 4/13/2013     Sensory loss 10/17/2011    Bottom of feet; uncertain if there is a neuropathy per notes.       Stiffness of joint, not elsewhere classified, hand 3/5/2013     Tremor 10/15/2011    head     Uncomplicated asthma     presents prior to surgery for assessment of perioperative risk. The patient is at LOW risk for cardiovascular complications and at LOW risk for pulmonary complications of this LOW risk surgery.    --Approval given to proceed with proposed procedure  --Patient is taking diabetes medications. -----Hold usual oral and non-insulin diabetic meds (e.g. Metformin, Actos, Glipizide) while NPO.     No evidence of functionally compromising cardiac or pulmonary status  The patient is recommended to hold aspirin or NSAIDS for at least 1 day prior to surgery as she has done in the past.    Proceed with surgery as planned.      Laboratory studies:  CBC and BMP  Pregnancy testing was not indicated.    Cardiovascular: EKG was obtained.  Regular sinus rhythm without T wave abnormalities. Low voltage QRS. Unchanged from last EKG 5/19/15    Please contact our office if there are any further questions or information required about this patient.    Wood Lawrence, MS4  February 3, 2020    Staff note; I personally reviewed Ms. Palma's past medical history. I interviewed her and conducted a physical examination. I agree with the above assessment and plan. Also reviewed today's EKG with previous EKG 2015     HOLLY Koroma MD     Total time spent 25 minutes.  More than 50% of the time spent with Ms. Palma on counseling / coordinating her care          Gerber Koroma MD

## 2022-12-23 NOTE — PROGRESS NOTES
received an [facility] [address] request from [facility] for a visit for Mr. Ashley Miles. He was asleep when the  entered his room. He opened his eyes and greeted the  with a smile. He stated he was fine and appreciated the visit. He politely stated he didn't need anything at this time. Mr. Ashley Miles is located on the CCU floor in room 2528.  advised him of the availability of pastoral care services 24 hours a day as requested. Rev. Norwood Meigs, D.  [address] [facility]   Paging Service [phone] Tried submitting PA for Rexulti 1MG tablets, Key: [id number]. Eligibility could not be verified for this patient - patient not found. Please reach out to patient for NEW PRESCRIPTION coverage. Thank you    If this requires a response please respond to the pool ( [phone] [phone] [facility]).

## 2022-12-29 ENCOUNTER — HOSPITAL ENCOUNTER (OUTPATIENT)
Dept: PET IMAGING | Age: 82
Discharge: HOME | End: 2022-12-29
Attending: INTERNAL MEDICINE
Payer: MEDICARE

## 2022-12-29 VITALS — BODY MASS INDEX: 21.03 KG/M2 | WEIGHT: 142 LBS | HEIGHT: 69 IN

## 2022-12-29 DIAGNOSIS — R53.0 NEOPLASTIC (MALIGNANT) RELATED FATIGUE: ICD-10-CM

## 2022-12-29 DIAGNOSIS — T45.1X5A ANEMIA DUE TO ANTINEOPLASTIC CHEMOTHERAPY: ICD-10-CM

## 2022-12-29 DIAGNOSIS — Z85.01 PERSONAL HISTORY OF MALIGNANT NEOPLASM OF ESOPHAGUS: ICD-10-CM

## 2022-12-29 DIAGNOSIS — D64.9 ANEMIA, UNSPECIFIED: ICD-10-CM

## 2022-12-29 DIAGNOSIS — G89.3 NEOPLASM RELATED PAIN (ACUTE) (CHRONIC): ICD-10-CM

## 2022-12-29 DIAGNOSIS — J91.0 MALIGNANT PLEURAL EFFUSION: ICD-10-CM

## 2022-12-29 DIAGNOSIS — C34.11 MALIGNANT NEOPLASM OF UPPER LOBE OF RIGHT LUNG (HCC): ICD-10-CM

## 2022-12-29 DIAGNOSIS — D64.81 ANEMIA DUE TO ANTINEOPLASTIC CHEMOTHERAPY: ICD-10-CM

## 2022-12-29 DIAGNOSIS — R05.8 OTHER SPECIFIED COUGH: ICD-10-CM

## 2022-12-29 DIAGNOSIS — E83.42 HYPOMAGNESEMIA: ICD-10-CM

## 2022-12-29 LAB
GLUCOSE BLD STRIP.AUTO-MCNC: 81 MG/DL (ref 65–117)
SERVICE CMNT-IMP: NORMAL

## 2022-12-29 PROCEDURE — A9552 F18 FDG: HCPCS

## 2022-12-29 RX ORDER — FLUDEOXYGLUCOSE F-18 200 MCI/ML
10 INJECTION INTRAVENOUS ONCE
Status: COMPLETED | OUTPATIENT
Start: 2022-12-29 | End: 2022-12-29

## 2022-12-29 RX ADMIN — FLUDEOXYGLUCOSE F-18 10 MILLICURIE: 200 INJECTION INTRAVENOUS at 09:00

## 2022-12-30 ENCOUNTER — PATIENT OUTREACH (OUTPATIENT)
Dept: CASE MANAGEMENT | Age: 82
End: 2022-12-30

## 2022-12-30 NOTE — ACP (ADVANCE CARE PLANNING)
Advance Care Planning     General Advance Care Planning (ACP) Conversation      Date of Conversation: 12/30/2022  Conducted with: Patient with Decision Making Capacity    Healthcare Decision Maker:     Primary Decision Maker: Cristofer Smoker - [phone]    Secondary Decision Maker: Luz Blum - Daughter - [phone]  Click here to complete [address] including selection of the Healthcare Decision Maker Relationship (ie \"Primary\")        Content/Action Overview:    Has ACP document(s) on file - reflects the patient's care preferences  Reviewed DNR/DNI and patient confirms current DNR status - completed forms on file (place new order if needed)         Length of Voluntary ACP Conversation in minutes:  <16 minutes (Non-Billable)    Josephine Ward RN

## 2022-12-30 NOTE — PROGRESS NOTES
Care Transitions Follow Up Call    Challenges to be reviewed by the provider   Additional needs identified to be addressed with provider: no  medications- hydrocodone prescribed to patient from heme onc for headaches. Method of communication with provider : chart routing    Care Transition Nurse (CTN) contacted the family by telephone to follow up after admission on 12/10/22. Verified name and  with family as identifiers. Addressed changes since last contact: medications- hydrocodone ordered for headaches  Follow up appointment completed? yes. Was follow up appointment scheduled within 7 days of discharge? yes. Advance Care Planning:   Does patient have an Advance Directive:  yes; reviewed and current     CTN reviewed discharge instructions, medical action plan and red flags with family and discussed any barriers to care and/or understanding of plan of care after discharge. Discussed appropriate site of care based on symptoms and resources available to patient including: PCP and Specialist. The family agrees to contact the PCP office for questions related to their healthcare. Patients top risk factors for readmission: functional physical ability and polypharmacy   Interventions to address risk factors: Scheduled appointment with Specialist-cardio 23 and rad onc 23 and heme onc on 23 and Obtained and reviewed discharge summary and/or continuity of care documents    [facility] follow up appointment(s):   Future Appointments   Date Time Provider Savannah Asencio   2023  3:45 PM MD GUZMAN Workman BS AMB   2023  8:45 AM MD GUZMAN Workman BS AMB     Non-[facility] follow up appointment(s): cardiology, heme onc, rad onc    CTN provided contact information for future needs. Plan for follow-up call in 5-7 days based on severity of symptoms and risk factors.   Plan for next call: symptom management-headaches and pain meds, pleurx drain       Goals Addressed                   This Visit's Progress     Prevent complications post hospitalization. On track     12/16/22    Using teachback, Patient verbalized understanding of completing both antibiotics. Using teachback, Patient wife verbalized understanding of elequis and the reason there are 2 prescriptions. Patient to attend pcp appt on 12/22/22  [facility] to call patient this evening and see patient 12/17/22  Wife empties and cares for pleural drain independently. If any bleeding is to be seen in pleural drainage she is to call pcp on call immediately or oncologist on call. Ctn to follow up in one week. Diamond Sky RN    12/30/22  Using teachback patient to take hydrocodone as prescribed for headaches. Patient to attend cardiologist appt on 1/6/22, and rad onc on 1/5/23, and heme onc on 1/9/23  Using teachback wife confirmed understanding of pushing fluids for adequate hydration and freezing ensure so it is like icecream to eat. Ctn to follow up in one week.    Diamond Sky RN

## 2023-01-05 NOTE — PROGRESS NOTES
ICD-10-CM ICD-9-CM    1. Abnormal weight loss  R63.4 783.21 dronabinoL (Marinol) 2.5 mg capsule      2. Brain metastases (HCC)  C79.31 198.3       3. Malignant neoplasm of upper lobe of right lung (HCC)  C34.11 162.3                Subjective:     Chief Complaint   Patient presents with    Follow-up       Ramírez Hernández is a 80 y.o. M. He has a past medical history of non-small cell lung carcinoma now with brain metastases, chronic kidney disease, and hypertension, among other numerous medical problems. I reviewed and updated the medical record. I saw this patient most recently in late December for follow-up. At the time, the patient had previously been hospitalized for postobstructive pneumonia, which was treated with intravenous antibiotic therapy. During his hospitalization, he had been noted to have brain metastases on brain imaging. At the time of his follow-up, the patient had reported that his oncology team was considering a different medication such as a K-lu inhibitor for palliative care. I had continued the patient on his anticoagulation for findings of previous pulmonary embolism. He was continued on metoprolol for his history of hypertension. Today, the patient comes in for follow-up on his chronic medical concerns. Since his last visit, he has continued to lose weight despite his best attempts at increasing protein and calories in his diet. He says that food just does not taste quite as good as it has in the past.  That said, he remains in good spirits. He recently has started his K-lu inhibitor, at the direction of oncology. He has completed his course of stereotactic brain radiation, and has had no recent worsening headaches or focal neurological concerns. He denies any worsening shortness of breath. He will be following up with pulmonary medicine to determine if additional adjustment of his indwelling pleural catheter is needed.   Overall, his exercise tolerance has been somewhat stable though he relates to worsening fatigue. His review of systems is otherwise negative. Routine Healthcare Maintenance issues are reviewed and discussed with the patient as noted below. Orders to update gaps in healthcare maintenance were placed as noted below in the Assessment and Plan, where applicable. 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 Right ventricle is mildly dilated. Mildly reduced systolic function. Aortic Valve: Mild regurgitation. Tricuspid Valve: Mild regurgitation. The estimated RVSP is 36 mmHg.     History of esophageal cancer 2008    tx esophagectomy, chemo, radiation (complete 2014)    History of gout     History of pulmonary embolism 12/2022    RX = Lovenix --> Eliquis    Hx of colonic polyps     Hyperkalemia 04/29/2019    Kidney cyst     Malignant pleural effusion     With indwelling plerex catheter    CHAVEZ (nonalcoholic steatohepatitis) 2000    \"fatty liver\" diet related    Non-small cell lung cancer metastatic to brain [facility])     Personal history of colonic polyps 12/06/2012    Primary hypertension     Stage 3 chronic kidney disease ([address] 04/29/2019    Thrombocytopenia ([address]     r/t chemo    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 01/16/2018    Vitamin D deficiency 01/16/2018       Past Surgical Histor:  Past Surgical History:   Procedure Laterality Date    COLONOSCOPY N/A 6/23/2016    COLONOSCOPY performed by Tavia Sifuentes MD at [facility] ENDOSCOPY    COLONOSCOPY N/A 7/2/2021    COLONOSCOPY performed by Maximino Easley MD at [address]  7/2/2021         HX CATARACT REMOVAL Bilateral     HX CHOLECYSTECTOMY  9/29/11    HX ESOPHAGECTOMY  2008    HX HEMORRHOIDECTOMY      HX HERNIA REPAIR      HX ROTATOR CUFF REPAIR Right 2014    HX VASCULAR ACCESS Right 2008    port placed and removed in 2014    IR INSERT CATH PLEURAL INDWELL  5/24/2022    IR INSERT TUNL CVC W PORT OVER 5 YEARS  5/19/2022    IR REMOVE TUNL CVAD W PORT/PUMP  7/7/2022    WA COLSC FLX W/RMVL OF TUMOR POLYP LESION SNARE TQ  7/1/2010         WA EGD BALLOON DILATION ESOPHAGUS <30 MM DIAM  3/3/2011         WA EGD BALLOON DILATION ESOPHAGUS <30 MM DIAM  5/22/2012         WA EGD TRANSORAL BIOPSY SINGLE/MULTIPLE  3/29/2012         UPPER GI ENDOSCOPY,BALL DIL,30MM  1/11/2018         UPPER GI ENDOSCOPY,BALL DIL,30MM  1/31/2020            Allergies:  No Known Allergies    Medications:  Current Outpatient Medications   Medication Sig Dispense Refill    dronabinoL (Marinol) 2.5 mg capsule Take 1 Capsule by mouth two (2) times a day for 180 days. Max Daily Amount: 5 mg. 60 Capsule 5    dexAMETHasone (DECADRON) 4 mg tablet       Lumakras 120 mg tab Take 960 mg (8 tabs)daily for 28 days each cycle      metoprolol tartrate (LOPRESSOR) 25 mg tablet Take 1 Tablet by mouth every twelve (12) hours. 180 Tablet 3    [START ON 3/10/2023] apixaban (ELIQUIS) 5 mg tablet Take 1 Tablet by mouth two (2) times a day. Indications: a clot in the lung 180 Tablet 3    albuterol (Ventolin HFA) 90 mcg/actuation inhaler Take 1 Puff by inhalation every six (6) hours as needed for Wheezing, Shortness of Breath or Respiratory Distress.  18 g 2 Hydromet 5-1.5 mg/5 mL oral solution       mirtazapine (REMERON) 30 mg tablet       HYDROcodone-acetaminophen (NORCO) 5-325 mg per tablet       cholecalciferol, vitamin D3, 50 mcg (2,000 unit) tab Take 1 Tab by mouth daily. CYANOCOBALAMIN (VITAMIN B-12 PO) Take 1,000 mg by mouth daily. pyridoxine, vitamin B6, (VITAMIN B-6) 100 mg tablet Take 100 mg by mouth daily. MULTIVITAMINS (MULTIVITAMIN PO) Take 1 Tablet by mouth daily.       esomeprazole (NEXIUM) 40 mg capsule TAKE 1 CAPSULE DAILY 90 Cap 1       Social History:  Social History     Socioeconomic History    Marital status:    Tobacco Use    Smoking status: Former     Packs/day: 2.00     Years: 25.00     Pack years: 50.00     Types: Cigarettes     Quit date: 1987     Years since quittin.0    Smokeless tobacco: Never   Vaping Use    Vaping Use: Never used   Substance and Sexual Activity    Alcohol use: No    Drug use: No       Family History:  Family History   Problem Relation Age of Onset    Diabetes Mother     OSTEOARTHRITIS Father     Cancer Sister         lung    Cancer Sister         breast       Immunizations:  Immunization History   Administered Date(s) Administered    COVID-19, PFIZER PURPLE top, DILUTE for use, (age 15 y+), IM, 30mcg/0.3mL 2021, 2021, 2021    Influenza High Dose Vaccine PF 2017, 2022    Influenza Vaccine 10/13/2015, 2016, 2020    Influenza Vaccine (Tri) Adjuvanted (>65 Yrs FLUAD TRI 23719) 2018, 10/11/2019    Influenza Vaccine Split 2011    Influenza, FLUARIX, FLULAVAL, Sidra Torres (age 10 mo+) AND AFLURIA, (age 1 y+), PF, 0.5mL 10/06/2021    Pneumococcal Conjugate (PCV-13) 2015    Pneumococcal Polysaccharide (PPSV-23) 10/23/2008, 2009    Tdap 2019    Zoster Recombinant 2019, 2019        Healthcare Maintenance:  Health Maintenance   Topic Date Due    Eye Exam Retinal or Dilated  2021    COVID-19 Vaccine (4 - Booster for Pfizer series) 11/20/2021    A1C test (Diabetic or Prediabetic)  09/01/2022    Lipid Screen  03/01/2023    Medicare Yearly Exam  03/30/2023    Depression Screen  12/22/2023    DTaP/Tdap/Td series (2 - Td or Tdap) 02/05/2029    Shingles Vaccine  Completed    Flu Vaccine  Completed    Pneumococcal 65+ years  Completed        Review of Systems:  ROS:  Review of Systems   Constitutional:  Positive for malaise/fatigue and weight loss. HENT: Negative. Eyes: Negative. Respiratory: Negative. Cardiovascular: Negative. Gastrointestinal: Negative. Genitourinary: Negative. Musculoskeletal: Negative. Skin: Negative. Neurological: Negative. Endo/Heme/Allergies: Negative. Psychiatric/Behavioral: Negative. ROS otherwise negative      Objective:     Vital Signs:  Visit Vitals  /70 (BP 1 Location: Right arm, BP Patient Position: Sitting, BP Cuff Size: Adult)   Pulse 88   Resp 18   Ht 5' 9\" (1.753 m)   Wt 139 lb 14.4 oz (63.5 kg)   SpO2 97%   BMI 20.66 kg/m²       BMI:  Body mass index is 20.66 kg/m². Physical Examination:  Physical Exam     Physical exam otherwise negative    Diagnostic Testing:    Laboratory Studies:  Hospital Outpatient Visit on 12/29/2022   Component Date Value Ref Range Status    Glucose (POC) 12/29/2022 81  65 - 117 mg/dL Final    Comment: (NOTE)  The FDA has indicated that no capillary point of care blood glucose  monitoring systems are approved for use in \"critically ill\" patients,  however they have not defined this population. The College of  American Pathologists has recommended that these devices should not  be used in cases such as severe hypotension, dehydration, shock, and  hyperglycemic-hyperosmolar state, amongst others. Venous or arterial  collection is the recommended specimen for testing these patients. Performed by 12/29/2022 Pablito Castellanos   Final   No results displayed because visit has over 200 results.       Hospital Outpatient Visit on 09/30/2022   Component Date Value Ref Range Status    Glucose (POC) 09/30/2022 78  65 - 117 mg/dL Final    Comment: (NOTE)  The FDA has indicated that no capillary point of care blood glucose  monitoring systems are approved for use in \"critically ill\" patients,  however they have not defined this population. The College of  American Pathologists has recommended that these devices should not  be used in cases such as severe hypotension, dehydration, shock, and  hyperglycemic-hyperosmolar state, amongst others. Venous or arterial  collection is the recommended specimen for testing these patients. Performed by 09/30/2022 Jessica Rich   Final   Admission on 09/19/2022, Discharged on 09/19/2022   Component Date Value Ref Range Status    Ventricular Rate 09/19/2022 92  BPM Final    Atrial Rate 09/19/2022 92  BPM Final    P-R Interval 09/19/2022 150  ms Final    QRS Duration 09/19/2022 70  ms Final    Q-T Interval 09/19/2022 352  ms Final    QTC Calculation (Bezet) 09/19/2022 435  ms Final    Calculated P Axis 09/19/2022 49  degrees Final    Calculated R Axis 09/19/2022 28  degrees Final    Calculated T Axis 09/19/2022 47  degrees Final    Diagnosis 09/19/2022    Final                    Value:Normal sinus rhythm  Low voltage QRS  When compared with ECG of 30-APR-2014 15:18,  Vent.  rate has increased BY  32 BPM  Confirmed by PEGGY Quinn ([id number]) on 9/19/2022 2:59:13 PM      WBC 09/19/2022 6.6  4.1 - 11.1 K/uL Final    RBC 09/19/2022 4.03 (A)  4.10 - 5.70 M/uL Final    HGB 09/19/2022 12.5  12.1 - 17.0 g/dL Final    HCT 09/19/2022 39.3  36.6 - 50.3 % Final    MCV 09/19/2022 97.5  80.0 - 99.0 FL Final    MCH 09/19/2022 31.0  26.0 - 34.0 PG Final    MCHC 09/19/2022 31.8  30.0 - 36.5 g/dL Final    RDW 09/19/2022 13.6  11.5 - 14.5 % Final    PLATELET 68/48/1650 475  150 - 400 K/uL Final    MPV 09/19/2022 10.8  8.9 - 12.9 FL Final    NRBC 09/19/2022 0.0  0  WBC Final    ABSOLUTE NRBC 09/19/2022 0.00  0.00 - 0.01 K/uL Final    NEUTROPHILS 09/19/2022 60  32 - 75 % Final    LYMPHOCYTES 09/19/2022 25  12 - 49 % Final    MONOCYTES 09/19/2022 9  5 - 13 % Final    EOSINOPHILS 09/19/2022 5  0 - 7 % Final    BASOPHILS 09/19/2022 1  0 - 1 % Final    IMMATURE GRANULOCYTES 09/19/2022 0  0.0 - 0.5 % Final    ABS. NEUTROPHILS 09/19/2022 4.0  1.8 - 8.0 K/UL Final    ABS. LYMPHOCYTES 09/19/2022 1.7  0.8 - 3.5 K/UL Final    ABS. MONOCYTES 09/19/2022 0.6  0.0 - 1.0 K/UL Final    ABS. EOSINOPHILS 09/19/2022 0.3  0.0 - 0.4 K/UL Final    ABS. BASOPHILS 09/19/2022 0.1  0.0 - 0.1 K/UL Final    ABS. IMM. GRANS. 09/19/2022 0.0  0.00 - 0.04 K/UL Final    DF 09/19/2022 AUTOMATED    Final    Sodium 09/19/2022 141  136 - 145 mmol/L Final    Potassium 09/19/2022 4.7  3.5 - 5.1 mmol/L Final    Chloride 09/19/2022 108  97 - 108 mmol/L Final    CO2 09/19/2022 30  21 - 32 mmol/L Final    Anion gap 09/19/2022 3 (A)  5 - 15 mmol/L Final    Glucose 09/19/2022 112 (A)  65 - 100 mg/dL Final    BUN 09/19/2022 24 (A)  6 - 20 MG/DL Final    Creatinine 09/19/2022 0.94  0.70 - 1.30 MG/DL Final    BUN/Creatinine ratio 09/19/2022 26 (A)  12 - 20   Final    GFR est AA 09/19/2022 >60  >60 ml/min/1.73m2 Final    GFR est non-AA 09/19/2022 >60  >60 ml/min/1.73m2 Final    Estimated GFR is calculated using the IDMS-traceable Modification of Diet in Renal Disease (MDRD) Study equation, reported for both  Americans (GFRAA) and non- Americans (GFRNA), and normalized to 1.73m2 body surface area. The physician must decide which value applies to the patient. Calcium 09/19/2022 9.1  8.5 - 10.1 MG/DL Final    Bilirubin, total 09/19/2022 1.0  0.2 - 1.0 MG/DL Final    ALT (SGPT) 09/19/2022 18  12 - 78 U/L Final    AST (SGOT) 09/19/2022 24  15 - 37 U/L Final    Alk.  phosphatase 09/19/2022 95  45 - 117 U/L Final    Protein, total 09/19/2022 6.4  6.4 - 8.2 g/dL Final    Albumin 09/19/2022 2.9 (A)  3.5 - 5.0 g/dL Final    Globulin 09/19/2022 3.5  2.0 - 4.0 g/dL Final A-G Ratio 09/19/2022 0.8 (A)  1.1 - 2.2   Final    SAMPLES BEING HELD 09/19/2022 BRADEN AGUILERA   Final    COMMENT 09/19/2022 Add-on orders for these samples will be processed based on acceptable specimen integrity and analyte stability, which may vary by analyte. Final    NT pro-BNP 09/19/2022 360  <450 PG/ML Final    Comment:      NT-proBNP is highly sensitive for the detection of acute congestive heart failure in dyspneic patients. A NT-proBNP result <300 pg/mL has a negative predictive value of 98% for acute heart failure. Marked elevations in NT-proBNP levels may be observed in patients with left ventricular congestive failure, atrial fibrillation without clear heart failure, acute coronary syndromes, right heart strain/failure (including pulmonary embolism and cor pulmonale), critical illness, renal failure or advanced age. Falsely low NT-proBNP may be observed in obese CHF patients. Recent research  suggests the following cutoff values are appropriate based on patient age and clinical setting, reduce false positive (FP) results, and improve positive predictive values for acute heart failure:  Acutely dyspneic patient: age <50, use cutoff of 450 pg/mL  Acutely dyspneic patient: age 54-65, use cutoff of 900 pg/mL  Acutely dyspneic patient: age                            >76, use cutoff of 1800 pg/mL       FDA approved cutpoints for ruling-out heart failure in the office:  Ambulatory patient: age <73, use cutoff of 125 pg/mL  Ambulatory patient: age >76, use cutoff of 450 pg/mL      Troponin-High Sensitivity 09/19/2022 10  0 - 76 ng/L Final    Comment: A HS troponin value change of (+ or -) 50% or more below the 99th percentile, in a 1/2/3 hr interval represents a significant change. Clinical correcation is recommended. A HS troponin value change of (+ or -) 20% or above the 99th percentile, in a 1/2/3 hr interval represents a significant change. Clinical correlation is recommended.   99th Percentile: Women: 0-51 ng/L                                                                Men:   0-76 ng/L      Magnesium 09/19/2022 1.7  1.6 - 2.4 mg/dL Final         Radiographic Studies:  XR Results (most recent):  Results from Hospital Encounter encounter on 12/10/22    XR CHEST PORT    Narrative  EXAM:  XR CHEST PORT    INDICATION: Shortness of breath    COMPARISON: 9/19/2022    TECHNIQUE: PA and lateral chest views    FINDINGS: The right chest tube is stable. The cardiomediastinal contours are  stable. The pulmonary vasculature is within normal limits. There is an increased small to moderate right pleural effusion and right basilar  opacity. Lung nodules are better seen on prior CT. The left pleural space is  clear. There is no pneumothorax. The bones and upper abdomen are stable. Impression  Increased small to moderate right pleural effusion and right basilar opacity  that may represent atelectasis, edema, or infection. Right chest tube in place. SUNITHA Results (most recent):  No results found for this or any previous visit. CT Results (most recent):  Results from Hospital Encounter encounter on 12/10/22    CTA CHEST W OR W WO CONT    Narrative    Acute pulmonary embolism in the left lower lobar and left upper lobe segmental  pulmonary artery levels. Increased size of right lung masses. Right lower lobe  pneumonia. The preliminary findings were discussed with the emergency department on  12/11/2022 at 0055 hours by Dr. Louisa Patton. [phone]    Final report to follow. EXAM:  CTA CHEST W OR W WO CONT    INDICATION:  Shortness of breath, history of lung cancer    COMPARISON: September 19, 2022    TECHNIQUE: Helical thin section chest CT following uneventful intravenous  administration of nonionic contrast according to departmental PE protocol. Coronal and sagittal reformats were performed. 3D/MIP post processing was  performed.  CT dose reduction was achieved through use of a standardized protocol  tailored for this examination and automatic exposure control for dose  modulation. FINDINGS: This is a good quality study for the evaluation of pulmonary embolism  to the first subsegmental arterial level. There is lobar thrombus in the  lingular and left lower lobe pulmonary arteries as well as segmental thrombus in  the left upper lobe apical/posterior segment. Normal caliber thoracic aorta. No aneurysm. Normal heart size. Scattered  coronary calcifications. No pericardial effusion. No lymphadenopathy by imaging  size criteria. Right perihilar lung mass measures 6.8 x 6.8 cm, increased in size compared to  prior imaging. Heterogeneous attenuation of the right lower lobe either  infiltrated with tumor or secondary to pneumonia. Loculated right pleural  effusion with a pleural drainage catheter in place. Left lung is clear. Central  airways are unremarkable. Status post esophagectomy with gastric pull-through. Large right renal cyst. No  aggressive osseous lesion. Impression  1. Left upper and lower lobe segmental and lobar pulmonary embolism. 2.  No right-sided pulmonary embolism. 3.  Increased size of right perihilar lung mass. 4.  Small right pleural effusion with pleural drainage catheter in place. 5.  Heterogeneous attenuation of the right lower lobe suggestive of pneumonia. DEXA Results (most recent):  No results found for this or any previous visit. MRI Results (most recent):  Results from [place] encounter on 12/09/22    MRI BRAIN W WO CONT    Narrative  EXAM:  MRI BRAIN W WO CONT    INDICATION:    Esophageal carcinoma    COMPARISON: May 19, 2022    CONTRAST: 15 cc IV ProHance    TECHNIQUE:  Multiplanar multisequence acquisition without and with contrast of the brain. FINDINGS:  Diffusion imaging does not show acute ischemic changes. Extra-axial fluid collection hemorrhage or shift. Mild nonspecific white matter changes.   Multiple Small 4 mm ring-enhancing lesions without significant surrounding edema  as seen bilaterally supra and infratentorially. 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 Impression  brain metastases. Assessment/Plan:       ICD-10-CM ICD-9-CM    1. Abnormal weight loss  R63.4 783.21 dronabinoL (Marinol) 2.5 mg capsule      2. Brain metastases (HCC)  C79.31 198.3       3. Malignant neoplasm of upper lobe of right lung (HCC)  C34.11 162.3               - Given progressive wt loss despite ongoing nutritional supplementation + Remeron, trial of Marinol   - Could consider medical marijuana   - pending followup with pulmonary for Pleurx Catheter   - deferring other treatment considerations to specialty care        I have reviewed the patient's medical history in detail and updated the computerized patient record. We had a prolonged discussion about these complex clinical issues and went over the various important aspects to consider. All questions were answered. Advised the patient to call back or return to office if symptoms do not improve, change in nature, or persist.     The patient was given an after visit summary or informed of [facility] which includes patient instructions, diagnoses, current medications, & vitals. he expressed understanding with the diagnosis and plan. Mami Bedoya MD    Please note that this dictation was completed with [employer], the computer voice recognition software. Quite often unanticipated grammatical, syntax, homophones, and other interpretive errors are inadvertently transcribed by the computer software. Please disregard these errors. Please excuse any errors that have escaped final proofreading.

## 2023-01-10 ENCOUNTER — TRANSCRIBE ORDER (OUTPATIENT)
Dept: SCHEDULING | Age: 83
End: 2023-01-10

## 2023-01-10 ENCOUNTER — PATIENT OUTREACH (OUTPATIENT)
Dept: CASE MANAGEMENT | Age: 83
End: 2023-01-10

## 2023-01-10 DIAGNOSIS — I26.99 PULMONARY THROMBOSIS (HCC): Primary | ICD-10-CM

## 2023-01-10 RX ORDER — DEXAMETHASONE 4 MG/1
TABLET ORAL
COMMUNITY
Start: 2023-01-05

## 2023-01-10 NOTE — PROGRESS NOTES
Care Transitions Follow Up Call    Challenges to be reviewed by the provider   Additional needs identified to be addressed with provider: no  none           Method of communication with provider : none    Care Transition Nurse (CTN) contacted the family by telephone to follow up after admission on 12/10/22. Verified name and  with family as identifiers. Addressed changes since last contact:  patient currently receiving 5 radiation treatments for brain mets and on steroid course during treatment  Follow up appointment completed? yes. Was follow up appointment scheduled within 7 days of discharge? yes. Advance Care Planning:   Does patient have an Advance Directive:  yes; reviewed and current     CTN reviewed discharge instructions, medical action plan and red flags with family and discussed any barriers to care and/or understanding of plan of care after discharge. Discussed appropriate site of care based on symptoms and resources available to patient including: PCP, Specialist, and Home Health. The family agrees to contact the PCP office for questions related to their healthcare. 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 CTN provided contact information for future needs. Plan for follow-up call in 5-7 days based on severity of symptoms and risk factors.   Plan for next call: symptom management-radiation completed and discuss steroid taper, pleural drain removal scheduled? Goals Addressed                   This Visit's Progress     Prevent complications post hospitalization. On track     12/16/22    Using teachback, Patient verbalized understanding of completing both antibiotics. Using teachback, Patient wife verbalized understanding of elequis and the reason there are 2 prescriptions. Patient to attend pcp appt on 12/22/22  [facility] to call patient this evening and see patient 12/17/22  Wife empties and cares for pleural drain independently. If any bleeding is to be seen in pleural drainage she is to call pcp on call immediately or oncologist on call. Ctn to follow up in one week. April Pratt RN    12/30/22  Using teachback patient to take hydrocodone as prescribed for headaches. Patient to attend cardiologist appt on 1/6/22, and rad onc on 1/5/23, and heme onc on 1/9/23  Using teachback wife confirmed understanding of pushing fluids for adequate hydration and freezing ensure so it is like icecream to eat. Ctn to follow up in one week. April Pratt RN    1/10/23  Patient currently undergoing 5 radiation treatments for brain mets. Using teachback patient wife verbalized understanding of taking steroids during radiation and weaning them off after radiation is completed. Using teachback patient confirmed understanding of watching for signs of infection while on steroids and during radiation and oral chemo. Any signs of cough, cold, fever or redness, swelling, drainage from pleural drain need to be addressed by oncologist right away. Patient wife reporting patient with good appetite and eating and drinking well with no nausea or vomiting. Ctn to follow up in one week.    April Pratt RN

## 2023-01-18 ENCOUNTER — TELEPHONE (OUTPATIENT)
Dept: INTERNAL MEDICINE CLINIC | Age: 83
End: 2023-01-18

## 2023-01-18 NOTE — TELEPHONE ENCOUNTER
Physical Therapist called from [facility] regarding patient's heart rate of 52-54 today and shallow breathing with respiratory rate about 20-22. Had radiation treatment two days ago. States he is alert and oriented but somewhat lethargic. Advised that if patient is in distress he needs to go to the ER for further evaluation. States she feels he is stable.

## 2023-01-19 ENCOUNTER — TELEPHONE (OUTPATIENT)
Dept: INTERNAL MEDICINE CLINIC | Age: 83
End: 2023-01-19

## 2023-01-19 NOTE — TELEPHONE ENCOUNTER
Giovani Coello from [facility] called regarding the patient. States that the patient's HR was 55 today and that his weight is currently 133. States his weight last week was 139. Would like the provider to be advised and requests a call back if more information or directions are needed for the patient.

## 2023-01-20 ENCOUNTER — PATIENT OUTREACH (OUTPATIENT)
Dept: CASE MANAGEMENT | Age: 83
End: 2023-01-20

## 2023-01-20 NOTE — PROGRESS NOTES
Patient has graduated from the Transitions of Care Coordination  program on 1/20/23. Patient/family has the ability to self-manage at this time Care management goals have been completed. Patient was not referred to the [facility] team for further management. Goals Addressed                   This Visit's Progress     COMPLETED: Prevent complications post hospitalization. On track     12/16/22    Using teachback, Patient verbalized understanding of completing both antibiotics. Using teachback, Patient wife verbalized understanding of elequis and the reason there are 2 prescriptions. Patient to attend pcp appt on 12/22/22  [facility] to call patient this evening and see patient 12/17/22  Wife empties and cares for pleural drain independently. If any bleeding is to be seen in pleural drainage she is to call pcp on call immediately or oncologist on call. Ctn to follow up in one week. Bev Kim RN    12/30/22  Using teachback patient to take hydrocodone as prescribed for headaches. Patient to attend cardiologist appt on 1/6/22, and rad onc on 1/5/23, and heme onc on 1/9/23  Using teachback wife confirmed understanding of pushing fluids for adequate hydration and freezing ensure so it is like icecream to eat. Ctn to follow up in one week. Bev Kim RN    1/10/23  Patient currently undergoing 5 radiation treatments for brain mets. Using teachback patient wife verbalized understanding of taking steroids during radiation and weaning them off after radiation is completed. Using teachback patient confirmed understanding of watching for signs of infection while on steroids and during radiation and oral chemo. Any signs of cough, cold, fever or redness, swelling, drainage from pleural drain need to be addressed by oncologist right away. Patient wife reporting patient with good appetite and eating and drinking well with no nausea or vomiting. Ctn to follow up in one week. John Carter RN    1/20/23    Patient has had no readmission for 30 days. Patient has possible pleurx drain removal on 1/25 if no effusion noted on xray  Patient has completed palliative radiation for brain mets. Patient currently on steroid taper. Patient wife knowledgeable with patient care, high protein and high calorie diet needed for weight maintenance   Using teachback patient wife confirmed understanding of steroid taper and pain med administration for headaches. John Carter RN               Patient has Care Transition Nurse's contact information for any further questions, concerns, or needs.   Patients upcoming visits:    Future Appointments   Date Time Provider Savannah Asencio   1/24/2023  3:45 PM MD GUZMAN Condon BS AMB   1/25/2023  9:30 AM [facility]   6/7/2023  8:45 AM Humberto Brewster MD [facility] BS AMB

## 2023-01-24 ENCOUNTER — OFFICE VISIT (OUTPATIENT)
Dept: INTERNAL MEDICINE CLINIC | Age: 83
End: 2023-01-24
Payer: MEDICARE

## 2023-01-24 VITALS
HEART RATE: 88 BPM | HEIGHT: 69 IN | RESPIRATION RATE: 18 BRPM | WEIGHT: 139.9 LBS | OXYGEN SATURATION: 97 % | BODY MASS INDEX: 20.72 KG/M2 | SYSTOLIC BLOOD PRESSURE: 112 MMHG | DIASTOLIC BLOOD PRESSURE: 70 MMHG

## 2023-01-24 DIAGNOSIS — R63.4 ABNORMAL WEIGHT LOSS: Primary | ICD-10-CM

## 2023-01-24 DIAGNOSIS — C34.11 MALIGNANT NEOPLASM OF UPPER LOBE OF RIGHT LUNG (HCC): ICD-10-CM

## 2023-01-24 DIAGNOSIS — C79.31 BRAIN METASTASES (HCC): ICD-10-CM

## 2023-01-24 PROCEDURE — 99212 OFFICE O/P EST SF 10 MIN: CPT | Performed by: INTERNAL MEDICINE

## 2023-01-24 PROCEDURE — 3074F SYST BP LT 130 MM HG: CPT | Performed by: INTERNAL MEDICINE

## 2023-01-24 PROCEDURE — G8428 CUR MEDS NOT DOCUMENT: HCPCS | Performed by: INTERNAL MEDICINE

## 2023-01-24 PROCEDURE — G8420 CALC BMI NORM PARAMETERS: HCPCS | Performed by: INTERNAL MEDICINE

## 2023-01-24 PROCEDURE — 1101F PT FALLS ASSESS-DOCD LE1/YR: CPT | Performed by: INTERNAL MEDICINE

## 2023-01-24 PROCEDURE — 1123F ACP DISCUSS/DSCN MKR DOCD: CPT | Performed by: INTERNAL MEDICINE

## 2023-01-24 PROCEDURE — G8510 SCR DEP NEG, NO PLAN REQD: HCPCS | Performed by: INTERNAL MEDICINE

## 2023-01-24 PROCEDURE — G8536 NO DOC ELDER MAL SCRN: HCPCS | Performed by: INTERNAL MEDICINE

## 2023-01-24 PROCEDURE — 3078F DIAST BP <80 MM HG: CPT | Performed by: INTERNAL MEDICINE

## 2023-01-24 RX ORDER — DRONABINOL 2.5 MG/1
2.5 CAPSULE ORAL 2 TIMES DAILY
Qty: 60 CAPSULE | Refills: 5 | Status: SHIPPED | OUTPATIENT
Start: 2023-01-24 | End: 2023-07-23

## 2023-01-24 NOTE — PROGRESS NOTES
Chief Complaint   Patient presents with    Follow-up       1. \"Have you been to the ER, urgent care clinic since your last visit? Hospitalized since your last visit? \" No    2. \"Have you seen or consulted any other health care providers outside of the [address] [place] [address] since your last visit? \" No     3. For patients aged 39-70: Has the patient had a colonoscopy / FIT/ Cologuard? No      If the patient is female:    4. For patients aged 41-77: Has the patient had a mammogram within the past 2 years? No      5. For patients aged 21-65: Has the patient had a pap smear?  No

## 2023-01-25 ENCOUNTER — HOSPITAL ENCOUNTER (OUTPATIENT)
Dept: GENERAL RADIOLOGY | Age: 83
Discharge: HOME | End: 2023-01-25
Attending: STUDENT IN AN ORGANIZED HEALTH CARE EDUCATION/TRAINING PROGRAM
Payer: MEDICARE

## 2023-01-25 ENCOUNTER — HOSPITAL ENCOUNTER (OUTPATIENT)
Dept: INTERVENTIONAL RADIOLOGY/VASCULAR | Age: 83
Discharge: HOME | End: 2023-01-25
Attending: INTERNAL MEDICINE
Payer: MEDICARE

## 2023-01-25 DIAGNOSIS — I26.99 PULMONARY THROMBOSIS (HCC): ICD-10-CM

## 2023-01-25 PROCEDURE — 77030031139 HC SUT VCRL2 J&J -A

## 2023-01-25 PROCEDURE — 32552 REMOVE LUNG CATHETER: CPT

## 2023-01-25 PROCEDURE — 77030010507 HC ADH SKN DERMBND J&J -B

## 2023-01-25 PROCEDURE — 71045 X-RAY EXAM CHEST 1 VIEW: CPT

## 2023-01-25 PROCEDURE — 2709999900 HC NON-CHARGEABLE SUPPLY

## 2023-01-25 NOTE — PROGRESS NOTES
Patient discharged in no distress. Dressing to right lower chest is clean and dry. Wife and patient understand to watch for signs of infection.

## 2023-02-01 ENCOUNTER — TRANSCRIBE ORDER (OUTPATIENT)
Dept: SCHEDULING | Age: 83
End: 2023-02-01

## 2023-02-01 DIAGNOSIS — C79.31 SECONDARY MALIGNANT NEOPLASM OF BRAIN (HCC): Primary | ICD-10-CM

## 2023-02-02 ENCOUNTER — TELEPHONE (OUTPATIENT)
Dept: INTERNAL MEDICINE CLINIC | Age: 83
End: 2023-02-02

## 2023-02-02 DIAGNOSIS — R63.4 ABNORMAL WEIGHT LOSS: Primary | ICD-10-CM

## 2023-02-02 DIAGNOSIS — C79.31 BRAIN METASTASES (HCC): ICD-10-CM

## 2023-02-02 DIAGNOSIS — C34.11 MALIGNANT NEOPLASM OF UPPER LOBE OF RIGHT LUNG (HCC): ICD-10-CM

## 2023-02-02 NOTE — TELEPHONE ENCOUNTER
[address] Nurse says? Patient with   post radiation fatigue, balance loss/issues and decreased strength would   benefit from extension of home health PT; front wheeled walker & transport   chair. n/a

## 2023-02-12 ENCOUNTER — HOSPITAL ENCOUNTER (OUTPATIENT)
Dept: MRI IMAGING | Age: 83
Discharge: HOME | End: 2023-02-12
Attending: RADIOLOGY
Payer: MEDICARE

## 2023-02-12 DIAGNOSIS — C79.31 SECONDARY MALIGNANT NEOPLASM OF BRAIN (HCC): ICD-10-CM

## 2023-02-12 PROCEDURE — 70553 MRI BRAIN STEM W/O & W/DYE: CPT

## 2023-02-12 PROCEDURE — 74011250636 HC RX REV CODE- 250/636: Performed by: RADIOLOGY

## 2023-02-12 PROCEDURE — A9576 INJ PROHANCE MULTIPACK: HCPCS | Performed by: RADIOLOGY

## 2023-02-12 RX ADMIN — GADOTERIDOL 12 ML: 279.3 INJECTION, SOLUTION INTRAVENOUS at 08:02

## 2023-02-23 ENCOUNTER — TELEPHONE (OUTPATIENT)
Dept: PALLATIVE CARE | Age: 83
End: 2023-02-23

## 2023-02-23 DIAGNOSIS — R53.81 DEBILITY: ICD-10-CM

## 2023-02-23 DIAGNOSIS — C79.31 BRAIN METASTASIS (HCC): ICD-10-CM

## 2023-02-23 DIAGNOSIS — C34.91 PRIMARY LUNG ADENOCARCINOMA, RIGHT (HCC): Primary | ICD-10-CM

## 2023-02-23 NOTE — TELEPHONE ENCOUNTER
[facility] Palliative Medicine Office  Nursing Note  [phone]  Fax [phone]      Name:  Flora Dash  YOB: 1940    Received outpatient Palliative Medicine referral from Dr. Kerline Rodriguez to see patient for symptom management and supportive care. Faxed [facility] office visit notes from 2/6/23, 1/9/23 and 12/15/22 reviewed. Flora Dash is a 80 y.o. 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 Patient also has history of esophageal cancer. ACP:     [facility] signed on 8/16/2013 is on file. Primary Healthcare Decision Maker is spouse Miranda Moraes, Secondary Healthcare Decision Maker is daughter Mukund Calderon. Alternate Healthcare Decision Maker is son Tita Rayo. Nurse called patient and his wife answered the phone. She requests that nurse call their daughter Marge Halsted who schedules patient's appointments and provides transportation. This nurse called MsIvonne Zina Sigala and [facility] outpatient Palliative Medicine services. Appointment scheduled for 3/7/23 at 1pm with Dr. Gloria Ahumada. This nurse instructed Ms. Zina Sigala to bring  patient's pain medications in the original container. Also advised her that we schedule one hour appointments so if patient is unable to keep the appointment, please call our office @ [phone] as soon as possible to cancel/reschedule the appointment. She voices understanding.     VIKA RodN, RN-BC, [facility]

## 2023-02-27 ENCOUNTER — TRANSCRIBE ORDER (OUTPATIENT)
Dept: SCHEDULING | Age: 83
End: 2023-02-27

## 2023-02-27 DIAGNOSIS — C79.31 SECONDARY MALIGNANT NEOPLASM OF BRAIN AND SPINAL CORD (HCC): ICD-10-CM

## 2023-02-27 DIAGNOSIS — Z85.01 PERSONAL HISTORY OF MALIGNANT NEOPLASM OF ESOPHAGUS: ICD-10-CM

## 2023-02-27 DIAGNOSIS — C34.11 MALIGNANT NEOPLASM OF UPPER LOBE OF RIGHT LUNG (HCC): Primary | ICD-10-CM

## 2023-02-27 DIAGNOSIS — C79.49 SECONDARY MALIGNANT NEOPLASM OF BRAIN AND SPINAL CORD (HCC): ICD-10-CM

## 2023-03-01 NOTE — PROGRESS NOTES
Lai Slaughter is a 78 y.o. male presenting for Diabetes (6 mo fu)  . 1. Have you been to the ER, urgent care clinic since your last visit? Hospitalized since your last visit? No    2. Have you seen or consulted any other health care providers outside of the [address] [place] [address] since your last visit? Include any pap smears or colon screening.  [facility]- June 2019 for GI follow up. Fall Risk Assessment, last 12 mths 2/5/2019   Able to walk? Yes   Fall in past 12 months? No         Abuse Screening Questionnaire 2/5/2019   Do you ever feel afraid of your partner? N   Are you in a relationship with someone who physically or mentally threatens you? N   Is it safe for you to go home? Y       3 most recent PHQ Screens 2/5/2019   Little interest or pleasure in doing things Not at all   Feeling down, depressed, irritable, or hopeless Not at all   Total Score PHQ 2 0       There are no discontinued medications. Patient requests all Lab, Cardiology, and Radiology Results on their Discharge Instructions

## 2023-03-15 ENCOUNTER — TELEPHONE (OUTPATIENT)
Dept: PALLATIVE CARE | Age: 83
End: 2023-03-15

## 2023-03-15 NOTE — TELEPHONE ENCOUNTER
[facility] Outpatient Palliative Medicine Office  Nursing Note  [phone]  Fax [phone]     Name:  Mary Eldridge  YOB: 1940     Patient was referred to outpatient Palliative Medicine by Dr. Mary Jane Maciel.  Patient was scheduled to see Dr. Zhang Chirinos on 3/7/21. Patient was a no show for that appointment. This nurse called and left a message for Dr. Nuria Naqvi to inform her. Aundrea returned call and says that patient passed away last week, she did not know exact date of death.     Kacy Swanson, VIKAN, RN  Palliative Medicine  [phone]

## 2023-04-24 DIAGNOSIS — C79.49 SECONDARY MALIGNANT NEOPLASM OF BRAIN AND SPINAL CORD (HCC): ICD-10-CM

## 2023-04-24 DIAGNOSIS — C34.11 MALIGNANT NEOPLASM OF UPPER LOBE OF RIGHT LUNG (HCC): Primary | ICD-10-CM

## 2023-04-24 DIAGNOSIS — C79.31 SECONDARY MALIGNANT NEOPLASM OF BRAIN AND SPINAL CORD (HCC): ICD-10-CM

## 2023-04-24 DIAGNOSIS — Z85.01 PERSONAL HISTORY OF MALIGNANT NEOPLASM OF ESOPHAGUS: ICD-10-CM

## (undated) DEVICE — KENDALL DL ECG CABLE AND LEAD WIRE SYSTEM, 3-LEAD, SINGLE PATIENT USE: Brand: KENDALL

## (undated) DEVICE — SYR ASSEMB INFL BLLN 60ML --

## (undated) DEVICE — ELECTRODE,RADIOTRANSLUCENT,FOAM,5PK: Brand: MEDLINE

## (undated) DEVICE — ENDO CARRY-ON PROCEDURE KIT INCLUDES ENZYMATIC SPONGE, GAUZE, BIOHAZARD LABEL, TRAY, LUBRICANT, DIRTY SCOPE LABEL, WATER LABEL, TRAY, DRAWSTRING PAD, AND DEFENDO 4-PIECE KIT.: Brand: ENDO CARRY-ON PROCEDURE KIT

## (undated) DEVICE — FILTER IV 5UM L1.75IN FLX STRW FOR FLD ASPIR FR GLS AMP

## (undated) DEVICE — CONTINU-FLO SOLUTION SET, 2 INJECTION SITES, MALE LUER LOCK ADAPTER WITH RETRACTABLE COLLAR, LARGE BORE STOPCOCK WITH ROTATING MALE LUER LOCK EXTENSION SET, 2 INJECTION SITES, MALE LUER LOCK ADAPTER WITH RETRACTABLE COLLAR: Brand: INTERLINK/CONTINU-FLO

## (undated) DEVICE — TRAP SPEC COLL POLYP POLYSTYR --

## (undated) DEVICE — SYR 3ML LL TIP 1/10ML GRAD --

## (undated) DEVICE — NEONATAL-ADULT SPO2 SENSOR: Brand: NELLCOR

## (undated) DEVICE — 1200 GUARD II KIT W/5MM TUBE W/O VAC TUBE: Brand: GUARDIAN

## (undated) DEVICE — TOWEL 4 PLY TISS 19X30 SUE WHT

## (undated) DEVICE — BASIN EMSIS 16OZ GRAPHITE PLAS KID SHP MOLD GRAD FOR ORAL

## (undated) DEVICE — KENDALL RADIOLUCENT FOAM MONITORING ELECTRODE RECTANGULAR SHAPE: Brand: KENDALL

## (undated) DEVICE — Device: Brand: MEDEX

## (undated) DEVICE — 3M™ TEGADERM™ TRANSPARENT FILM DRESSING FRAME STYLE, 1624W, 2-3/8 IN X 2-3/4 IN (6 CM X 7 CM), 100/CT 4CT/CASE: Brand: 3M™ TEGADERM™

## (undated) DEVICE — Z DISCONTINUED PER MEDLINE LINE GAS SAMPLING O2/CO2 LNG AD 13 FT NSL W/ TBNG FILTERLINE

## (undated) DEVICE — KIT,1200CC CANISTER,3/16"X6' TUBING: Brand: MEDLINE INDUSTRIES, INC.

## (undated) DEVICE — ESOPHAGEAL BALLOON DILATATION CATHETER: Brand: CRE FIXED WIRE

## (undated) DEVICE — BLOCK BITE ENDOSCP AD 21 MM W/ DIL BLU LF DISP

## (undated) DEVICE — Device

## (undated) DEVICE — NEEDLE HYPO 18GA L1.5IN PNK S STL HUB POLYPR SHLD REG BVL

## (undated) DEVICE — SYR 10ML LUER LOK 1/5ML GRAD --

## (undated) DEVICE — CANNULA CUSH AD W/ 14FT TBG

## (undated) DEVICE — SNARE ENDOSCP XL W33MMXL240CM SHTH DIA2.4MM COLON STIFF

## (undated) DEVICE — SOLUTION LACTATED RINGERS INJECTION USP

## (undated) DEVICE — CATH IV AUTOGRD BC PNK 20GA 25 -- INSYTE

## (undated) DEVICE — SOLIDIFIER MEDC 1200ML -- CONVERT TO 356117

## (undated) DEVICE — SET ADMIN 16ML TBNG L100IN 2 Y INJ SITE IV PIGGY BK DISP

## (undated) DEVICE — THE PARA-PAK SYSTEMS PROVIDE STANDARDIZED PROCEDURES FOR THE ROUTINE COLLECTION, TRANSPORTATION, PRESERVATION AND EXAMINATION OF STOOL SPECIMENS FOR INTESTINAL PARASITES. KIT SYSTEMS ARE DESIGNED FOR EASY USE BY INDIVIDUALS NOT TRAINED IN MICROBIOLOGICAL PROCEDURES AND AFFORD AN EXCELLENT MEANS OF MINIMIZING THE ADVERSE EFFECTS OF DELAY IN SPECIMEN TRANSPORT.: Brand: PARA-PAK LV-PVA / 10% FORMALIN / CLEAN

## (undated) DEVICE — MEDI-VAC YANK SUCT HNDL W/TPRD BULBOUS TIP: Brand: CARDINAL HEALTH